# Patient Record
Sex: FEMALE | Race: WHITE | NOT HISPANIC OR LATINO | Employment: OTHER | URBAN - METROPOLITAN AREA
[De-identification: names, ages, dates, MRNs, and addresses within clinical notes are randomized per-mention and may not be internally consistent; named-entity substitution may affect disease eponyms.]

---

## 2019-09-21 NOTE — PROGRESS NOTES
Subjective   Chief Complaint   Patient presents with   • Annual Exam       History of Present Illness     60 yo wf presents as a new patient to establish care and have a new patient physical. She is feeling well and is without complaints. She lost 10# in the spring via diet and exercise. She was walking more, eating less, and eating better.     Last MMG 2013. Never had a colonoscopy. She is willing to have this done. Last P/P in 2013; uncertain with whom but thinks it was [facility].     She has her eyes checked twice yearly with the [facility].     She sees her dentist 1-2x a year.      There is no problem list on file for this patient.      No Known Allergies    Current Outpatient Medications on File Prior to Visit   Medication Sig Dispense Refill   • aspirin 81 MG EC tablet Take 81 mg by mouth Daily.     • Multiple Vitamins-Minerals (ICAPS AREDS 2 PO) Take  by mouth.     • Multiple Vitamins-Minerals (MULTI-VITAMIN GUMMIES PO) Take  by mouth.       No current facility-administered medications on file prior to visit.        History reviewed. No pertinent past medical history.    Family History   Problem Relation Age of Onset   • Hypertension Mother    • Diabetes Mother    • Diverticulitis Mother    • Heart disease Father    • Hearing loss Father        Social History     Socioeconomic History   • Marital status:      Spouse name: Not on file   • Number of children: Not on file   • Years of education: Not on file   • Highest education level: Not on file   Tobacco Use   • Smoking status: Never Smoker   • Smokeless tobacco: Never Used   Substance and Sexual Activity   • Alcohol use: Yes     Frequency: 2-3 times a week       Past Surgical History:   Procedure Laterality Date   • WISDOM TOOTH EXTRACTION  1987    bottom       The following portions of the patient's history were reviewed and updated as appropriate: problem list, allergies, current medications, past medical history, past family history,  "past social history and past surgical history.    Review of Systems   Constitutional: Negative for fatigue.   HENT: Negative for congestion.    Eyes: Negative for visual disturbance.   Respiratory: Negative for shortness of breath.    Cardiovascular: Negative for chest pain.   Gastrointestinal: Negative for blood in stool.   Endocrine: Negative.    Genitourinary: Negative.    Musculoskeletal: Negative for arthralgias.   Skin: Negative for rash.   Allergic/Immunologic: Negative for environmental allergies.   Neurological: Negative for headache.   Hematological: Does not bruise/bleed easily.   Psychiatric/Behavioral: The patient is not nervous/anxious.        Immunization History   Administered Date(s) Administered   • Tdap 09/23/2019   • flucelvax quad pfs =>4 YRS 09/23/2019       Objective   Vitals:    09/23/19 1049 09/23/19 1127   BP:  130/76   Pulse:  68   Resp:  12   Weight: 91.6 kg (202 lb)    Height: 157.5 cm (62\")      Physical Exam   Constitutional: She is oriented to person, place, and time. She appears well-developed and well-nourished.   HENT:   Head: Normocephalic and atraumatic.   Right Ear: External ear normal.   Left Ear: External ear normal.   Nose: Nose normal.   Mouth/Throat: Oropharynx is clear and moist.   Eyes: Conjunctivae and EOM are normal. Pupils are equal, round, and reactive to light. No scleral icterus.   Neck: Neck supple. Carotid bruit is not present. No thyromegaly present.   Cardiovascular: Normal rate, regular rhythm, normal heart sounds and intact distal pulses.   No murmur heard.  Pulmonary/Chest: Effort normal and breath sounds normal.   Abdominal: Soft. Bowel sounds are normal. She exhibits no distension and no mass. There is no hepatosplenomegaly. There is no tenderness. There is no rebound.   Musculoskeletal:   Ambulates without assistance; no clubbing, cyanosis or edema.    Lymphadenopathy:     She has no cervical adenopathy.   Neurological: She is alert and oriented to person, " place, and time.   Skin: Skin is warm and dry.   Psychiatric: She has a normal mood and affect.   Vitals reviewed.      Procedures    Assessment/Plan   Dudley was seen today for annual exam.    Diagnoses and all orders for this visit:    Annual physical exam  Comments:  Influenza and TDap vaccinations today. Encouraged weight loss. Shingrix at local pharmacy.     Breast cancer screening  -     Mammo Screening Bilateral With CAD; Future    Colon cancer screening  Comments:  Will set up with Dr. Virgen.   Orders:  -     Ambulatory Referral For Screening Colonoscopy    Health care maintenance  Comments:  Will schedule fasting labs and see Dr. Padilla for her P/P.   Orders:  -     Ambulatory Referral to Gynecology  -     Lipid Panel With / Chol / HDL Ratio  -     Comprehensive metabolic panel    Need for Tdap vaccination  Comments:  Today.   Orders:  -     Tdap Vaccine Greater Than or Equal To 6yo IM    Need for hepatitis C screening test  Comments:  With fasting labs.   Orders:  -     HCV Antibody Rfx To Qnt PCR    Elevated BP without diagnosis of hypertension  Comments:  New probelm; encouraged weight loss of 1/2-1# weekly with 150 min weekly aerobic activity and dietary changes. DASH diet encouraged.     Obesity (BMI 35.0-39.9 without comorbidity)  Comments:  New problem; encouraged weight loss via aerobic activity 150 minutes weekly as well as dietary changes.     Need for immunization against influenza  -     Flucelvax Quad=>4Years ([id number])        Return in about 3 months (around 12/23/2019).

## 2019-09-23 ENCOUNTER — OFFICE VISIT (OUTPATIENT)
Dept: INTERNAL MEDICINE | Facility: CLINIC | Age: 59
End: 2019-09-23

## 2019-09-23 VITALS
SYSTOLIC BLOOD PRESSURE: 130 MMHG | HEIGHT: 62 IN | RESPIRATION RATE: 12 BRPM | BODY MASS INDEX: 37.17 KG/M2 | HEART RATE: 68 BPM | WEIGHT: 202 LBS | DIASTOLIC BLOOD PRESSURE: 76 MMHG

## 2019-09-23 DIAGNOSIS — Z00.00 HEALTH CARE MAINTENANCE: ICD-10-CM

## 2019-09-23 DIAGNOSIS — R03.0 ELEVATED BP WITHOUT DIAGNOSIS OF HYPERTENSION: ICD-10-CM

## 2019-09-23 DIAGNOSIS — Z12.39 BREAST CANCER SCREENING: ICD-10-CM

## 2019-09-23 DIAGNOSIS — Z11.59 NEED FOR HEPATITIS C SCREENING TEST: ICD-10-CM

## 2019-09-23 DIAGNOSIS — Z23 NEED FOR IMMUNIZATION AGAINST INFLUENZA: ICD-10-CM

## 2019-09-23 DIAGNOSIS — Z12.11 COLON CANCER SCREENING: ICD-10-CM

## 2019-09-23 DIAGNOSIS — Z23 NEED FOR TDAP VACCINATION: ICD-10-CM

## 2019-09-23 DIAGNOSIS — Z00.00 ANNUAL PHYSICAL EXAM: Primary | ICD-10-CM

## 2019-09-23 DIAGNOSIS — E66.9 OBESITY (BMI 35.0-39.9 WITHOUT COMORBIDITY): ICD-10-CM

## 2019-09-23 PROCEDURE — 99386 PREV VISIT NEW AGE 40-64: CPT | Performed by: NURSE PRACTITIONER

## 2019-09-23 PROCEDURE — 90472 IMMUNIZATION ADMIN EACH ADD: CPT | Performed by: NURSE PRACTITIONER

## 2019-09-23 PROCEDURE — 90674 CCIIV4 VAC NO PRSV 0.5 ML IM: CPT | Performed by: NURSE PRACTITIONER

## 2019-09-23 PROCEDURE — 90471 IMMUNIZATION ADMIN: CPT | Performed by: NURSE PRACTITIONER

## 2019-09-23 PROCEDURE — 90715 TDAP VACCINE 7 YRS/> IM: CPT | Performed by: NURSE PRACTITIONER

## 2019-09-23 RX ORDER — ASPIRIN 81 MG/1
81 TABLET ORAL DAILY
COMMUNITY
End: 2021-07-18

## 2019-09-24 ENCOUNTER — TELEPHONE (OUTPATIENT)
Dept: OBSTETRICS AND GYNECOLOGY | Age: 59
End: 2019-09-24

## 2019-10-02 LAB
ALBUMIN SERPL-MCNC: 3.7 G/DL (ref 3.5–5.2)
ALBUMIN/GLOB SERPL: 1.2 G/DL
ALP SERPL-CCNC: 138 U/L (ref 39–117)
ALT SERPL-CCNC: 11 U/L (ref 1–33)
AST SERPL-CCNC: 14 U/L (ref 1–32)
BILIRUB SERPL-MCNC: 0.2 MG/DL (ref 0.2–1.2)
BUN SERPL-MCNC: 10 MG/DL (ref 6–20)
BUN/CREAT SERPL: 16.7 (ref 7–25)
CALCIUM SERPL-MCNC: 8.5 MG/DL (ref 8.6–10.5)
CHLORIDE SERPL-SCNC: 104 MMOL/L (ref 98–107)
CHOLEST SERPL-MCNC: 149 MG/DL (ref 0–200)
CHOLEST/HDLC SERPL: 3.24 {RATIO}
CO2 SERPL-SCNC: 27.4 MMOL/L (ref 22–29)
CREAT SERPL-MCNC: 0.6 MG/DL (ref 0.57–1)
GLOBULIN SER CALC-MCNC: 3.1 GM/DL
GLUCOSE SERPL-MCNC: 84 MG/DL (ref 65–99)
HCV AB S/CO SERPL IA: <0.1 S/CO RATIO (ref 0–0.9)
HCV AB SERPL QL IA: NORMAL
HDLC SERPL-MCNC: 46 MG/DL (ref 40–60)
LDLC SERPL CALC-MCNC: 82 MG/DL (ref 0–100)
POTASSIUM SERPL-SCNC: 4.5 MMOL/L (ref 3.5–5.2)
PROT SERPL-MCNC: 6.8 G/DL (ref 6–8.5)
SODIUM SERPL-SCNC: 141 MMOL/L (ref 136–145)
TRIGL SERPL-MCNC: 105 MG/DL (ref 0–150)
VLDLC SERPL CALC-MCNC: 21 MG/DL

## 2019-10-15 ENCOUNTER — HOSPITAL ENCOUNTER (OUTPATIENT)
Dept: MAMMOGRAPHY | Facility: HOSPITAL | Age: 59
Discharge: HOME | End: 2019-10-15
Admitting: NURSE PRACTITIONER

## 2019-10-15 DIAGNOSIS — Z12.39 BREAST CANCER SCREENING: ICD-10-CM

## 2019-10-15 PROCEDURE — 77067 SCR MAMMO BI INCL CAD: CPT

## 2019-12-18 NOTE — PROGRESS NOTES
"Subjective   Chief Complaint   Patient presents with   • Hypertension       History of Present Illness     60 yo wf presents for follow up regarding elevated BP for which LSM were advised.Denies CP or dyspnea. \"Walks with purpose\" 20 minutes 3d/wk. Has not lost weight.      Patient Active Problem List   Diagnosis   • Essential hypertension       No Known Allergies    Current Outpatient Medications on File Prior to Visit   Medication Sig Dispense Refill   • aspirin 81 MG EC tablet Take 81 mg by mouth Daily.     • Multiple Vitamins-Minerals (ICAPS AREDS 2 PO) Take  by mouth.     • Multiple Vitamins-Minerals (MULTI-VITAMIN GUMMIES PO) Take  by mouth.       No current facility-administered medications on file prior to visit.        History reviewed. No pertinent past medical history.    Family History   Problem Relation Age of Onset   • Hypertension Mother    • Diabetes Mother    • Diverticulitis Mother    • Heart disease Father    • Hearing loss Father        Social History     Socioeconomic History   • Marital status:      Spouse name: Not on file   • Number of children: Not on file   • Years of education: Not on file   • Highest education level: Not on file   Tobacco Use   • Smoking status: Never Smoker   • Smokeless tobacco: Never Used   Substance and Sexual Activity   • Alcohol use: Yes     Frequency: 2-3 times a week       Past Surgical History:   Procedure Laterality Date   • WISDOM TOOTH EXTRACTION  1987    bottom       The following portions of the patient's history were reviewed and updated as appropriate: problem list, allergies, current medications, past medical history and past social history.    Review of Systems   Respiratory: Negative for shortness of breath.    Cardiovascular: Negative for chest pain.       Immunization History   Administered Date(s) Administered   • Tdap 09/23/2019   • flucelvax quad pfs =>4 YRS 09/23/2019       Objective   Vitals:    12/19/19 1249 12/19/19 1316   BP:  138/76 " "  Pulse:  68   Resp:  12   Weight: 91.4 kg (201 lb 6.4 oz)    Height: 157.5 cm (62\")      Body mass index is 36.84 kg/m².  Physical Exam   Constitutional: She is oriented to person, place, and time. She appears well-developed and well-nourished.   HENT:   Head: Normocephalic and atraumatic.   Cardiovascular: Normal rate, regular rhythm, S1 normal, S2 normal and normal heart sounds.   Pulmonary/Chest: Effort normal and breath sounds normal.   Neurological: She is alert and oriented to person, place, and time.   Skin: Skin is warm and dry.   Psychiatric: She has a normal mood and affect.   Vitals reviewed.      Procedures    Assessment/Plan   Dudley was seen today for hypertension.    Diagnoses and all orders for this visit:    Essential hypertension  Comments:  New problem; start Losartan 10 mg daily & check BMP prior to follow up visit in 6 weeks. Side effects reviewed. 150 minutes weekly exercise. Weight loss advised  Orders:  -     losartan (COZAAR) 50 MG tablet; Take 1 tablet by mouth Daily.  -     Basic metabolic panel; Future    Records reviewed include previous OV with myself and labs.     Return in about 6 weeks (around 1/30/2020).           "

## 2019-12-19 ENCOUNTER — OFFICE VISIT (OUTPATIENT)
Dept: INTERNAL MEDICINE | Facility: CLINIC | Age: 59
End: 2019-12-19

## 2019-12-19 ENCOUNTER — TELEPHONE (OUTPATIENT)
Dept: INTERNAL MEDICINE | Facility: CLINIC | Age: 59
End: 2019-12-19

## 2019-12-19 VITALS
SYSTOLIC BLOOD PRESSURE: 138 MMHG | HEART RATE: 68 BPM | DIASTOLIC BLOOD PRESSURE: 76 MMHG | BODY MASS INDEX: 37.06 KG/M2 | WEIGHT: 201.4 LBS | HEIGHT: 62 IN | RESPIRATION RATE: 12 BRPM

## 2019-12-19 DIAGNOSIS — I10 ESSENTIAL HYPERTENSION: Primary | ICD-10-CM

## 2019-12-19 PROCEDURE — 99213 OFFICE O/P EST LOW 20 MIN: CPT | Performed by: NURSE PRACTITIONER

## 2019-12-19 RX ORDER — LOSARTAN POTASSIUM 50 MG/1
50 TABLET ORAL DAILY
Qty: 30 TABLET | Refills: 1 | Status: SHIPPED | OUTPATIENT
Start: 2019-12-19 | End: 2020-01-17 | Stop reason: SDUPTHER

## 2019-12-19 NOTE — TELEPHONE ENCOUNTER
Patient called and states that your note from today says start losartan 10 mg, but the prescription at the pharmacy says 50 mg. She is wanting to know which one is correct. Please advise.

## 2020-01-09 DIAGNOSIS — I10 ESSENTIAL HYPERTENSION: ICD-10-CM

## 2020-01-10 LAB
BUN SERPL-MCNC: 11 MG/DL (ref 8–23)
BUN/CREAT SERPL: 16.2 (ref 7–25)
CALCIUM SERPL-MCNC: 8.6 MG/DL (ref 8.6–10.5)
CHLORIDE SERPL-SCNC: 103 MMOL/L (ref 98–107)
CO2 SERPL-SCNC: 26.7 MMOL/L (ref 22–29)
CREAT SERPL-MCNC: 0.68 MG/DL (ref 0.57–1)
GLUCOSE SERPL-MCNC: 88 MG/DL (ref 65–99)
POTASSIUM SERPL-SCNC: 4.3 MMOL/L (ref 3.5–5.2)
SODIUM SERPL-SCNC: 142 MMOL/L (ref 136–145)

## 2020-01-15 NOTE — PROGRESS NOTES
Subjective   Chief Complaint   Patient presents with   • Hypertension       History of Present Illness     59 yo wf presents for follow up regarding HTN for which she was started on Losartan 50 mg daily. She is feeling well without complaints. Denies CP or dyspnea.      Patient Active Problem List   Diagnosis   • Essential hypertension       No Known Allergies    Current Outpatient Medications on File Prior to Visit   Medication Sig Dispense Refill   • aspirin 81 MG EC tablet Take 81 mg by mouth Daily.     • Multiple Vitamins-Minerals (ICAPS AREDS 2 PO) Take  by mouth.     • Multiple Vitamins-Minerals (MULTI-VITAMIN GUMMIES PO) Take  by mouth.       No current facility-administered medications on file prior to visit.        History reviewed. No pertinent past medical history.    Family History   Problem Relation Age of Onset   • Hypertension Mother    • Diabetes Mother    • Diverticulitis Mother    • Heart disease Father    • Hearing loss Father        Social History     Socioeconomic History   • Marital status:      Spouse name: Not on file   • Number of children: Not on file   • Years of education: Not on file   • Highest education level: Not on file   Tobacco Use   • Smoking status: Never Smoker   • Smokeless tobacco: Never Used   Substance and Sexual Activity   • Alcohol use: Yes     Frequency: 2-3 times a week       Past Surgical History:   Procedure Laterality Date   • WISDOM TOOTH EXTRACTION  1987    bottom       The following portions of the patient's history were reviewed and updated as appropriate: problem list, allergies, current medications, past medical history and past social history.    Review of Systems   Respiratory: Negative for shortness of breath.    Cardiovascular: Negative for chest pain.       Immunization History   Administered Date(s) Administered   • Tdap 09/23/2019   • flucelvax quad pfs =>4 YRS 09/23/2019       Objective   Vitals:    01/17/20 1255 01/17/20 1319   BP:  110/68  "  Pulse:  72   Resp:  12   Weight: 91.2 kg (201 lb)    Height: 157.5 cm (62\")      Body mass index is 36.76 kg/m².  Physical Exam   Constitutional: She appears well-developed and well-nourished.   HENT:   Head: Normocephalic and atraumatic.   Cardiovascular: Normal rate, regular rhythm, S1 normal, S2 normal and normal heart sounds.   Pulmonary/Chest: Effort normal and breath sounds normal.   Skin: Skin is warm and dry.   Psychiatric: She has a normal mood and affect.   Vitals reviewed.      Procedures    Assessment/Plan      Diagnoses and all orders for this visit:    1. Essential hypertension (Primary)  Comments:  BP at goal.   Orders:  -     Basic Metabolic Panel; Future  -     losartan (COZAAR) 50 MG tablet; Take 1 tablet by mouth Daily.  Dispense: 90 tablet; Refill: 1    2. Obesity, Class II, BMI 35-39.9  Comments:  Weight loss via dietary changes and 150 minutes weekly exercise advised.     3. Healthcare maintenance  Comments:  Shingrix at local pharmacy. P/P with Dr. Padilla in April. Has paperwork for C-scope.     Records reviewed include previous OV with myself as well as labs.     Return in about 6 months (around 7/17/2020).           "

## 2020-01-17 ENCOUNTER — OFFICE VISIT (OUTPATIENT)
Dept: INTERNAL MEDICINE | Facility: CLINIC | Age: 60
End: 2020-01-17

## 2020-01-17 VITALS
RESPIRATION RATE: 12 BRPM | HEART RATE: 72 BPM | BODY MASS INDEX: 36.99 KG/M2 | HEIGHT: 62 IN | WEIGHT: 201 LBS | SYSTOLIC BLOOD PRESSURE: 110 MMHG | DIASTOLIC BLOOD PRESSURE: 68 MMHG

## 2020-01-17 DIAGNOSIS — I10 ESSENTIAL HYPERTENSION: Primary | ICD-10-CM

## 2020-01-17 DIAGNOSIS — E66.9 OBESITY, CLASS II, BMI 35-39.9: ICD-10-CM

## 2020-01-17 DIAGNOSIS — Z00.00 HEALTHCARE MAINTENANCE: ICD-10-CM

## 2020-01-17 PROCEDURE — 99213 OFFICE O/P EST LOW 20 MIN: CPT | Performed by: NURSE PRACTITIONER

## 2020-01-17 RX ORDER — LOSARTAN POTASSIUM 50 MG/1
50 TABLET ORAL DAILY
Qty: 90 TABLET | Refills: 1 | Status: SHIPPED | OUTPATIENT
Start: 2020-01-17 | End: 2020-02-14 | Stop reason: SDUPTHER

## 2020-01-17 NOTE — PATIENT INSTRUCTIONS
You are advised to get Shingrix. It is a series of 2 shots given 2-6 months apart. Get this at you local pharmacy.

## 2020-02-14 ENCOUNTER — TELEPHONE (OUTPATIENT)
Dept: INTERNAL MEDICINE | Facility: CLINIC | Age: 60
End: 2020-02-14

## 2020-02-14 DIAGNOSIS — I10 ESSENTIAL HYPERTENSION: ICD-10-CM

## 2020-02-14 RX ORDER — LOSARTAN POTASSIUM 50 MG/1
50 TABLET ORAL DAILY
Qty: 90 TABLET | Refills: 1 | Status: SHIPPED | OUTPATIENT
Start: 2020-02-14 | End: 2020-07-17 | Stop reason: SDUPTHER

## 2020-02-14 NOTE — TELEPHONE ENCOUNTER
Patient needs refill on Losartan for 3 months supply sent in to [facility], please and thank you.

## 2020-07-09 DIAGNOSIS — I10 ESSENTIAL HYPERTENSION: ICD-10-CM

## 2020-07-11 LAB
BUN SERPL-MCNC: 11 MG/DL (ref 8–23)
BUN/CREAT SERPL: 13.9 (ref 7–25)
CALCIUM SERPL-MCNC: 8.7 MG/DL (ref 8.6–10.5)
CHLORIDE SERPL-SCNC: 105 MMOL/L (ref 98–107)
CO2 SERPL-SCNC: 25.4 MMOL/L (ref 22–29)
CREAT SERPL-MCNC: 0.79 MG/DL (ref 0.57–1)
GLUCOSE SERPL-MCNC: 100 MG/DL (ref 65–99)
POTASSIUM SERPL-SCNC: 4.3 MMOL/L (ref 3.5–5.2)
SODIUM SERPL-SCNC: 141 MMOL/L (ref 136–145)

## 2020-07-14 NOTE — PROGRESS NOTES
Subjective   Chief Complaint   Patient presents with   • Hypertension       History of Present Illness   60 y.o. female presents for follow up regarding HTN. Feeling good. Denies CP or dyspnea. Lost a good friend yesterday to COVID who was otherwise healthy. Notes she has had several dietary indiscretions over the last week prior to her labs. Denies polyuria, dipsia or phagia.      Patient Active Problem List   Diagnosis   • Essential hypertension       No Known Allergies    Current Outpatient Medications on File Prior to Visit   Medication Sig Dispense Refill   • aspirin 81 MG EC tablet Take 81 mg by mouth Daily.     • Multiple Vitamins-Minerals (ICAPS AREDS 2 PO) Take  by mouth.     • Multiple Vitamins-Minerals (MULTI-VITAMIN GUMMIES PO) Take  by mouth.     • [DISCONTINUED] losartan (COZAAR) 50 MG tablet Take 1 tablet by mouth Daily. 90 tablet 1     No current facility-administered medications on file prior to visit.        History reviewed. No pertinent past medical history.    Family History   Problem Relation Age of Onset   • Hypertension Mother    • Diabetes Mother    • Diverticulitis Mother    • Heart disease Father    • Hearing loss Father        Social History     Socioeconomic History   • Marital status:      Spouse name: Not on file   • Number of children: Not on file   • Years of education: Not on file   • Highest education level: Not on file   Tobacco Use   • Smoking status: Never Smoker   • Smokeless tobacco: Never Used   Substance and Sexual Activity   • Alcohol use: Yes     Frequency: 2-3 times a week       Past Surgical History:   Procedure Laterality Date   • WISDOM TOOTH EXTRACTION  1987    bottom       The following portions of the patient's history were reviewed and updated as appropriate: problem list, allergies, current medications, past medical history and past social history.    Review of Systems   Respiratory: Negative for shortness of breath.    Cardiovascular: Negative for chest  "pain.       Immunization History   Administered Date(s) Administered   • Influenza, Unspecified 09/23/2019   • Shingrix 03/02/2020, 06/22/2020   • Tdap 09/23/2019   • flucelvax quad pfs =>4 YRS 09/23/2019       Objective   Vitals:    07/17/20 0937   Temp: 97.5 °F (36.4 °C)   Weight: 89.4 kg (197 lb)   Height: 157.5 cm (62\")     Body mass index is 36.03 kg/m².  Physical Exam    Procedures    Assessment/Plan   Dudley was seen today for hypertension.    Diagnoses and all orders for this visit:    Essential hypertension  Comments:  Controlled.   Orders:  -     Comprehensive Metabolic Panel; Future  -     losartan (Cozaar) 50 MG tablet; Take 1 tablet by mouth Daily.    Impaired fasting glucose  -     Hemoglobin A1c; Future    Healthcare maintenance  Comments:  Declines colonoscopy. Advised to consider Cologard--will discuss timothy at her physical in 6 months.   Orders:  -     Lipid Panel With / Chol / HDL Ratio; Future    Obesity (BMI 30-39.9)  Comments:  Weight loss via dietary changes and 150 minutes weekly exercise advised.       Return in about 6 months (around 1/17/2021) for Annual physical, Lab B4 FUP.       "

## 2020-07-17 ENCOUNTER — OFFICE VISIT (OUTPATIENT)
Dept: INTERNAL MEDICINE | Facility: CLINIC | Age: 60
End: 2020-07-17

## 2020-07-17 VITALS — WEIGHT: 197 LBS | BODY MASS INDEX: 36.25 KG/M2 | HEIGHT: 62 IN | TEMPERATURE: 97.5 F

## 2020-07-17 DIAGNOSIS — Z00.00 HEALTHCARE MAINTENANCE: ICD-10-CM

## 2020-07-17 DIAGNOSIS — E66.9 OBESITY (BMI 30-39.9): ICD-10-CM

## 2020-07-17 DIAGNOSIS — R73.01 IMPAIRED FASTING GLUCOSE: ICD-10-CM

## 2020-07-17 DIAGNOSIS — I10 ESSENTIAL HYPERTENSION: Primary | ICD-10-CM

## 2020-07-17 PROCEDURE — 99213 OFFICE O/P EST LOW 20 MIN: CPT | Performed by: NURSE PRACTITIONER

## 2020-07-17 RX ORDER — LOSARTAN POTASSIUM 50 MG/1
50 TABLET ORAL DAILY
Qty: 90 TABLET | Refills: 1 | Status: SHIPPED | OUTPATIENT
Start: 2020-07-17 | End: 2020-08-04

## 2020-07-17 NOTE — PATIENT INSTRUCTIONS
Please get your flu shot when it becomes available.     Weight loss via dietary changes and 150 minutes weekly aerobic activity advised.

## 2020-08-04 DIAGNOSIS — I10 ESSENTIAL HYPERTENSION: ICD-10-CM

## 2020-08-04 RX ORDER — LOSARTAN POTASSIUM 50 MG/1
TABLET ORAL
Qty: 30 TABLET | Refills: 5 | Status: SHIPPED | OUTPATIENT
Start: 2020-08-04 | End: 2021-01-18 | Stop reason: SDUPTHER

## 2020-09-14 ENCOUNTER — OFFICE VISIT (OUTPATIENT)
Dept: OBSTETRICS AND GYNECOLOGY | Age: 60
End: 2020-09-14

## 2020-09-14 VITALS
DIASTOLIC BLOOD PRESSURE: 74 MMHG | WEIGHT: 195 LBS | BODY MASS INDEX: 34.55 KG/M2 | HEIGHT: 63 IN | SYSTOLIC BLOOD PRESSURE: 132 MMHG

## 2020-09-14 DIAGNOSIS — Z12.4 SCREENING FOR MALIGNANT NEOPLASM OF THE CERVIX: ICD-10-CM

## 2020-09-14 DIAGNOSIS — Z01.419 ENCOUNTER FOR GYNECOLOGICAL EXAMINATION WITHOUT ABNORMAL FINDING: Primary | ICD-10-CM

## 2020-09-14 PROCEDURE — 99386 PREV VISIT NEW AGE 40-64: CPT | Performed by: OBSTETRICS & GYNECOLOGY

## 2020-09-14 NOTE — PROGRESS NOTES
Routine Annual Visit    2020    Patient: Dudley Estrada          MR#:[id number]      History of Present Illness    Chief Complaint   Patient presents with   • Establish Care     new pt    • Gynecologic Exam     cc: pt here for annual visit today , last mmg 10/15/19 neg [facility] , last pap 2016 neg , pt says she had one abnormal pap in her life , colonoscopy not done had referral to dr jaramillo did not go . no complaints today , never had dexa        60 y.o. female  who presents as new patient for annual visit today.  Last mmg 10/15/19 neg [facility] , last pap 2016 neg , pt says she had one abnormal pap in her life. Colonoscopy not done had referral to dr jaramillo did not go. No complaints today.  Never had dexa.  Menopause at age 54. No HRT. Urged to get colonoscopy- never had one - agrees to schedule. She and  are retired. Him from accounting for [employer] and her from photograghy in [employer]. 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         No LMP recorded (lmp unknown). Patient is postmenopausal.  Obstetric History:  OB History        0    Para   0    Term   0       0    AB   0    Living   0       SAB   0    TAB   0    Ectopic   0    Molar   0    Multiple   0    Live Births   0               Menstrual History:     No LMP recorded (lmp unknown). Patient is postmenopausal.       Sexual History:       ________________________________________  Patient Active Problem List   Diagnosis   • Essential hypertension       Past Medical History:   Diagnosis Date   • Abnormal Pap smear of cervix     years ago        Past Surgical History:   Procedure Laterality Date   • WISDOM TOOTH EXTRACTION      bottom       Social History     Tobacco Use   Smoking Status Never Smoker   Smokeless Tobacco Never Used       Family History   Problem Relation Age of Onset   • Hypertension Mother    • Diabetes Mother    • Diverticulitis Mother    • Heart disease Father    •  "Hearing loss Father    • Breast cancer Neg Hx    • Ovarian cancer Neg Hx    • Uterine cancer Neg Hx    • Colon cancer Neg Hx        Prior to Admission medications    Medication Sig Start Date End Date Taking? Authorizing Provider   losartan (COZAAR) 50 MG tablet TAKE 1 TABLET BY MOUTH EVERY DAY 8/4/20  Yes Haydee Sherman APRN   Multiple Vitamins-Minerals (ICAPS AREDS 2 PO) Take  by mouth.   Yes Shirlene Ordoñez MD   Multiple Vitamins-Minerals (MULTI-VITAMIN GUMMIES PO) Take  by mouth.   Yes Shirlene Ordoñez MD   aspirin 81 MG EC tablet Take 81 mg by mouth Daily.    Shirlene Ordoñez MD     ________________________________________    Current contraception: none  History of abnormal Pap smear: yes - one many years ago    Family history of uterine or ovarian cancer: no  Family History of colon cancer/colon polyps: Never had colonoscopy   History of abnormal mammogram: no  History of abnormal lipids: no    The following portions of the patient's history were reviewed and updated as appropriate: allergies, current medications, past family history, past medical history, past social history, past surgical history and problem list.    Review of Systems    Pertinent items are noted in HPI.     Objective   Physical Exam    /74   Ht 160 cm (63\")   Wt 88.5 kg (195 lb)   LMP  (LMP Unknown)   Breastfeeding No   BMI 34.54 kg/m²    BP Readings from Last 3 Encounters:   09/14/20 132/74   01/17/20 110/68   12/19/19 138/76      Wt Readings from Last 3 Encounters:   09/14/20 88.5 kg (195 lb)   07/17/20 89.4 kg (197 lb)   01/17/20 91.2 kg (201 lb)        BMI: Estimated body mass index is 34.54 kg/m² as calculated from the following:    Height as of this encounter: 160 cm (63\").    Weight as of this encounter: 88.5 kg (195 lb).       General: alert, appears stated age and cooperative       Lungs: Effort wnl    Abdomen: soft, non-tender, without masses or organomegaly and soft, non-tender, without " masses, no organomegaly   Breast: inspection negative, no nipple discharge or bleeding, no masses or nodularity palpable   Vulva: normal   Vagina: atrophic appearance    Cervix: no lesions and nulliparous appearance   Uterus: normal size, fixed/poorly mobile  or non-tender   Adnexa: normal adnexa and exam limited by habitus      As part of wellness and prevention, the following topics were discussed with the patient:  Encouraged self breast exam  Encouraged screening colonoscopy         Assessment:    Dudley was seen today for establish care and gynecologic exam.    Diagnoses and all orders for this visit:    Encounter for gynecological examination without abnormal finding    Screening for malignant neoplasm of the cervix  -     IgP, Aptima HPV        Plan:  Return in about 1 year (around 9/14/2021) for Annual physical.      Haydee Padilla MD  9/14/2020 10:52 EDT

## 2020-09-16 LAB
CYTOLOGIST CVX/VAG CYTO: NORMAL
CYTOLOGY CVX/VAG DOC CYTO: NORMAL
CYTOLOGY CVX/VAG DOC THIN PREP: NORMAL
DX ICD CODE: NORMAL
HIV 1 & 2 AB SER-IMP: NORMAL
HPV I/H RISK 4 DNA CVX QL PROBE+SIG AMP: NEGATIVE
OTHER STN SPEC: NORMAL
STAT OF ADQ CVX/VAG CYTO-IMP: NORMAL

## 2021-01-17 PROBLEM — E66.811 OBESITY (BMI 30.0-34.9): Status: ACTIVE | Noted: 2021-01-17

## 2021-01-17 PROBLEM — E66.9 OBESITY (BMI 30.0-34.9): Status: ACTIVE | Noted: 2021-01-17

## 2021-01-17 NOTE — PATIENT INSTRUCTIONS
An order has been placed for your screening colonoscopy. Please be sure to schedule this.     An order was placed for your screening mammogram. Please be sure to have this scheduled.

## 2021-01-17 NOTE — PROGRESS NOTES
Subjective   Chief Complaint   Patient presents with   • Annual Exam       History of Present Illness   61 y.o. female presents for annual physical. Feeling well without complaints.     P/P with Dr. Padilla 09/14/2020--normal. RTO 1-2 year. Last MMG 10/15/2019. Never had DXA scan--would like to wait till after vaccinations against COVID more widespread.     Declines C-scope due to negative FH.     Patient Active Problem List   Diagnosis   • Essential hypertension   • Obesity (BMI 30.0-34.9)   • Morbidly obese (CMS/HCC)       No Known Allergies    Current Outpatient Medications on File Prior to Visit   Medication Sig Dispense Refill   • Multiple Vitamins-Minerals (ICAPS AREDS 2 PO) Take  by mouth.     • Multiple Vitamins-Minerals (MULTI-VITAMIN GUMMIES PO) Take  by mouth.     • [DISCONTINUED] losartan (COZAAR) 50 MG tablet TAKE 1 TABLET BY MOUTH EVERY DAY 30 tablet 5   • aspirin 81 MG EC tablet Take 81 mg by mouth Daily.       No current facility-administered medications on file prior to visit.        Past Medical History:   Diagnosis Date   • Abnormal Pap smear of cervix     years ago        Family History   Problem Relation Age of Onset   • Hypertension Mother    • Diabetes Mother    • Diverticulitis Mother    • Heart disease Father    • Hearing loss Father    • Breast cancer Neg Hx    • Ovarian cancer Neg Hx    • Uterine cancer Neg Hx    • Colon cancer Neg Hx        Social History     Socioeconomic History   • Marital status:      Spouse name: Not on file   • Number of children: Not on file   • Years of education: Not on file   • Highest education level: Not on file   Tobacco Use   • Smoking status: Never Smoker   • Smokeless tobacco: Never Used   Substance and Sexual Activity   • Alcohol use: Yes     Frequency: 2-3 times a week   • Drug use: Never   • Sexual activity: Yes     Partners: Male       Past Surgical History:   Procedure Laterality Date   • WISDOM TOOTH EXTRACTION  1987    bottom       The following  portions of the patient's history were reviewed and updated as appropriate: problem list, allergies, current medications, past medical history, past family history, past social history and past surgical history.    Review of Systems   Constitutional: Negative for fatigue.   HENT: Negative for congestion.    Eyes: Negative for visual disturbance.   Respiratory: Negative for shortness of breath.    Cardiovascular: Negative for chest pain.   Gastrointestinal: Negative for blood in stool.   Endocrine: Negative.    Genitourinary: Negative.    Musculoskeletal: Negative for arthralgias.   Skin: Negative for rash.   Allergic/Immunologic: Negative for environmental allergies.   Neurological: Negative for headache.   Hematological: Does not bruise/bleed easily.   Psychiatric/Behavioral: The patient is not nervous/anxious.        Immunization History   Administered Date(s) Administered   • Flu Vaccine Intradermal Quad 18-64YR 10/06/2020   • Influenza, Unspecified 09/23/2019   • Shingrix 03/02/2020, 06/22/2020   • Tdap 09/23/2019   • flucelvax quad pfs =>4 YRS 09/23/2019, 10/08/2020       Objective   Vitals:    01/18/21 1253   BP: 126/68   Pulse: 76   Resp: 16   Temp: 97.8 °F (36.6 °C)   Weight: 91.6 kg (202 lb)     Body mass index is 35.78 kg/m².  Physical Exam  Vitals signs reviewed.   Constitutional:       Appearance: Normal appearance. She is well-developed.   HENT:      Head: Normocephalic and atraumatic.      Right Ear: Tympanic membrane, ear canal and external ear normal.      Left Ear: Tympanic membrane, ear canal and external ear normal.      Nose: Nose normal.      Mouth/Throat:      Mouth: Mucous membranes are moist.      Pharynx: Oropharynx is clear. No oropharyngeal exudate or posterior oropharyngeal erythema.   Eyes:      General: No scleral icterus.     Extraocular Movements: Extraocular movements intact.      Conjunctiva/sclera: Conjunctivae normal.      Pupils: Pupils are equal, round, and reactive to light.    Neck:      Musculoskeletal: Neck supple.      Thyroid: No thyromegaly.   Cardiovascular:      Rate and Rhythm: Normal rate and regular rhythm.      Heart sounds: Normal heart sounds. No murmur.   Pulmonary:      Effort: Pulmonary effort is normal.      Breath sounds: Normal breath sounds.   Abdominal:      General: Bowel sounds are normal. There is no distension.      Palpations: Abdomen is soft.      Tenderness: There is no abdominal tenderness.   Musculoskeletal:      Comments: Ambulates without assistance; no clubbing, cyanosis or edema.    Lymphadenopathy:      Cervical: No cervical adenopathy.   Skin:     General: Skin is warm and dry.   Neurological:      Mental Status: She is alert.   Psychiatric:         Mood and Affect: Mood normal.         Behavior: Behavior normal.         Procedures    Assessment/Plan   Diagnoses and all orders for this visit:    1. Annual physical exam (Primary)  Comments:  Weight loss via dietary changes and 150 minutes aerobic exercise weekly advised. C-scope advised but she declines at this time. Schedule DXA at next visit.     2. Essential hypertension  Comments:  Control.     Orders:  -     Basic Metabolic Panel; Future  -     losartan (COZAAR) 50 MG tablet; Take 1 tablet by mouth Daily.  Dispense: 90 tablet; Refill: 1    3. Encounter for screening mammogram for malignant neoplasm of breast  -     Mammo Screening Bilateral With CAD    4. Colon cancer screening  Comments:  Declines at this time. Reassess in follow up.     5. Morbidly obese (CMS/HCC)  Comments:  Weight loss via dietary changes and 150 minutes wekly aerobic exercise advised.     Records reviewed include previous OV with myself as well as labs.     Return in about 6 months (around 7/18/2021) for Lab B4 FUP.

## 2021-01-18 ENCOUNTER — OFFICE VISIT (OUTPATIENT)
Dept: INTERNAL MEDICINE | Facility: CLINIC | Age: 61
End: 2021-01-18

## 2021-01-18 VITALS
RESPIRATION RATE: 16 BRPM | HEART RATE: 76 BPM | TEMPERATURE: 97.8 F | SYSTOLIC BLOOD PRESSURE: 126 MMHG | WEIGHT: 202 LBS | BODY MASS INDEX: 35.78 KG/M2 | DIASTOLIC BLOOD PRESSURE: 68 MMHG

## 2021-01-18 DIAGNOSIS — Z12.31 ENCOUNTER FOR SCREENING MAMMOGRAM FOR MALIGNANT NEOPLASM OF BREAST: ICD-10-CM

## 2021-01-18 DIAGNOSIS — Z12.11 COLON CANCER SCREENING: ICD-10-CM

## 2021-01-18 DIAGNOSIS — Z00.00 ANNUAL PHYSICAL EXAM: Primary | ICD-10-CM

## 2021-01-18 DIAGNOSIS — E66.01 MORBIDLY OBESE (HCC): ICD-10-CM

## 2021-01-18 DIAGNOSIS — I10 ESSENTIAL HYPERTENSION: ICD-10-CM

## 2021-01-18 PROCEDURE — 99396 PREV VISIT EST AGE 40-64: CPT | Performed by: NURSE PRACTITIONER

## 2021-01-18 RX ORDER — LOSARTAN POTASSIUM 50 MG/1
50 TABLET ORAL DAILY
Qty: 90 TABLET | Refills: 1 | Status: SHIPPED | OUTPATIENT
Start: 2021-01-18 | End: 2021-07-29

## 2021-01-26 ENCOUNTER — TRANSCRIBE ORDERS (OUTPATIENT)
Dept: ADMINISTRATIVE | Facility: HOSPITAL | Age: 61
End: 2021-01-26

## 2021-01-26 DIAGNOSIS — Z12.31 SCREENING MAMMOGRAM, ENCOUNTER FOR: Primary | ICD-10-CM

## 2021-01-28 ENCOUNTER — APPOINTMENT (OUTPATIENT)
Dept: MAMMOGRAPHY | Facility: HOSPITAL | Age: 61
End: 2021-01-28

## 2021-03-22 ENCOUNTER — BULK ORDERING (OUTPATIENT)
Dept: CASE MANAGEMENT | Facility: OTHER | Age: 61
End: 2021-03-22

## 2021-03-22 DIAGNOSIS — Z23 IMMUNIZATION DUE: ICD-10-CM

## 2021-07-12 DIAGNOSIS — I10 ESSENTIAL HYPERTENSION: ICD-10-CM

## 2021-07-12 LAB
BUN SERPL-MCNC: 10 MG/DL (ref 8–23)
BUN/CREAT SERPL: 14.3 (ref 7–25)
CALCIUM SERPL-MCNC: 8.9 MG/DL (ref 8.6–10.5)
CHLORIDE SERPL-SCNC: 107 MMOL/L (ref 98–107)
CO2 SERPL-SCNC: 24.6 MMOL/L (ref 22–29)
CREAT SERPL-MCNC: 0.7 MG/DL (ref 0.57–1)
GLUCOSE SERPL-MCNC: 86 MG/DL (ref 65–99)
POTASSIUM SERPL-SCNC: 4.3 MMOL/L (ref 3.5–5.2)
SODIUM SERPL-SCNC: 141 MMOL/L (ref 136–145)

## 2021-07-18 NOTE — PROGRESS NOTES
Subjective   Chief Complaint   Patient presents with   • Hypertension       History of Present Illness   61 y.o. female presents for follow up regarding HTN and obesity. She is feeling well without chest or dyspnea. Down 3# trying to eat less but no dietary changes. More active--using pool more and gardening.     Willing to have CLS after a healthy friend was diagnosed with CRC.      Patient Active Problem List   Diagnosis   • Essential hypertension   • Morbidly obese (CMS/HCC)       No Known Allergies    Current Outpatient Medications on File Prior to Visit   Medication Sig Dispense Refill   • losartan (COZAAR) 50 MG tablet Take 1 tablet by mouth Daily. 90 tablet 1   • Multiple Vitamins-Minerals (ICAPS AREDS 2 PO) Take  by mouth.     • Multiple Vitamins-Minerals (MULTI-VITAMIN GUMMIES PO) Take  by mouth.       No current facility-administered medications on file prior to visit.       Past Medical History:   Diagnosis Date   • Abnormal Pap smear of cervix     years ago        Family History   Problem Relation Age of Onset   • Hypertension Mother    • Diabetes Mother    • Diverticulitis Mother    • Heart disease Father    • Hearing loss Father    • Breast cancer Neg Hx    • Ovarian cancer Neg Hx    • Uterine cancer Neg Hx    • Colon cancer Neg Hx        Social History     Socioeconomic History   • Marital status:      Spouse name: Not on file   • Number of children: Not on file   • Years of education: Not on file   • Highest education level: Not on file   Tobacco Use   • Smoking status: Never Smoker   • Smokeless tobacco: Never Used   Substance and Sexual Activity   • Alcohol use: Yes   • Drug use: Never   • Sexual activity: Yes     Partners: Male       Past Surgical History:   Procedure Laterality Date   • WISDOM TOOTH EXTRACTION  1987    bottom       The following portions of the patient's history were reviewed and updated as appropriate: problem list, allergies, current medications, past medical history,  "past family history, past social history and past surgical history.    Review of Systems    Immunization History   Administered Date(s) Administered   • COVID-19 (PFIZER) 03/02/2021, 03/23/2021   • Flu Vaccine Intradermal Quad 18-64YR 10/06/2020   • Influenza, Unspecified 09/23/2019   • Shingrix 03/02/2020, 06/22/2020   • Tdap 09/23/2019   • flucelvax quad pfs =>4 YRS 09/23/2019, 10/08/2020       Objective   Vitals:    07/19/21 1027   BP: 118/68   Pulse: 72   Resp: 16   Temp: 97 °F (36.1 °C)   Weight: 90.3 kg (199 lb)   Height: 160 cm (63\")     Body mass index is 35.25 kg/m².  Physical Exam  Vitals reviewed.   Constitutional:       Appearance: Normal appearance. She is well-developed.   HENT:      Head: Normocephalic and atraumatic.   Cardiovascular:      Rate and Rhythm: Normal rate and regular rhythm.      Heart sounds: Normal heart sounds, S1 normal and S2 normal.   Pulmonary:      Effort: Pulmonary effort is normal.      Breath sounds: Normal breath sounds.   Skin:     General: Skin is warm and dry.   Neurological:      Mental Status: She is alert.   Psychiatric:         Mood and Affect: Mood normal.         Behavior: Behavior normal.         Procedures    Assessment/Plan   Diagnoses and all orders for this visit:    1. Essential hypertension (Primary)  Comments:  Controlled. Continue Losartan 50 mg daily.   Orders:  -     Comprehensive Metabolic Panel; Future  -     Lipid Panel With / Chol / HDL Ratio; Future    2. Morbidly obese (CMS/HCC)  Comments:  Weight loss via dietary cahnges and 150 minutes weekly aerobic activity advised.     3. Colon cancer screening  Comments:  Schedule CLS with Dr. Alvarez.    Orders:  -     Ambulatory Referral For Screening Colonoscopy    4. COVID-19 vaccine series completed    Records reviewed include previous OV with myself as well as labs.     Return in about 6 months (around 1/19/2022) for Annual physical.           "

## 2021-07-19 ENCOUNTER — OFFICE VISIT (OUTPATIENT)
Dept: INTERNAL MEDICINE | Facility: CLINIC | Age: 61
End: 2021-07-19

## 2021-07-19 VITALS
SYSTOLIC BLOOD PRESSURE: 118 MMHG | BODY MASS INDEX: 35.26 KG/M2 | HEIGHT: 63 IN | RESPIRATION RATE: 16 BRPM | HEART RATE: 72 BPM | WEIGHT: 199 LBS | DIASTOLIC BLOOD PRESSURE: 68 MMHG | TEMPERATURE: 97 F

## 2021-07-19 DIAGNOSIS — I10 ESSENTIAL HYPERTENSION: Primary | Chronic | ICD-10-CM

## 2021-07-19 DIAGNOSIS — Z12.11 COLON CANCER SCREENING: ICD-10-CM

## 2021-07-19 DIAGNOSIS — Z92.29 COVID-19 VACCINE SERIES COMPLETED: ICD-10-CM

## 2021-07-19 DIAGNOSIS — E66.01 MORBIDLY OBESE (HCC): Chronic | ICD-10-CM

## 2021-07-19 PROBLEM — E66.9 OBESITY (BMI 30.0-34.9): Status: RESOLVED | Noted: 2021-01-17 | Resolved: 2021-07-19

## 2021-07-19 PROBLEM — E66.811 OBESITY (BMI 30.0-34.9): Status: RESOLVED | Noted: 2021-01-17 | Resolved: 2021-07-19

## 2021-07-19 PROCEDURE — 99214 OFFICE O/P EST MOD 30 MIN: CPT | Performed by: NURSE PRACTITIONER

## 2021-07-19 NOTE — PATIENT INSTRUCTIONS
An order has been placed for your screening colonoscopy. Please be sure to schedule this.     Please be sure to schedule a skin check with your dermatologist; if you do not have a dermatologist, I recommend Dr. Beau Nash, Dr. Regino Faye or Dr. Yusra Lecuhga

## 2021-07-29 DIAGNOSIS — I10 ESSENTIAL HYPERTENSION: ICD-10-CM

## 2021-07-29 RX ORDER — LOSARTAN POTASSIUM 50 MG/1
TABLET ORAL
Qty: 90 TABLET | Refills: 1 | Status: SHIPPED | OUTPATIENT
Start: 2021-07-29 | End: 2022-02-07

## 2021-08-12 ENCOUNTER — PREP FOR SURGERY (OUTPATIENT)
Dept: SURGERY | Facility: SURGERY CENTER | Age: 61
End: 2021-08-12

## 2021-08-12 DIAGNOSIS — Z12.11 ENCOUNTER FOR SCREENING FOR MALIGNANT NEOPLASM OF COLON: Primary | ICD-10-CM

## 2021-08-12 RX ORDER — SODIUM CHLORIDE 0.9 % (FLUSH) 0.9 %
10 SYRINGE (ML) INJECTION AS NEEDED
Status: CANCELLED | OUTPATIENT
Start: 2021-08-12

## 2021-08-12 RX ORDER — SODIUM CHLORIDE, SODIUM LACTATE, POTASSIUM CHLORIDE, CALCIUM CHLORIDE 600; 310; 30; 20 MG/100ML; MG/100ML; MG/100ML; MG/100ML
30 INJECTION, SOLUTION INTRAVENOUS CONTINUOUS PRN
Status: CANCELLED | OUTPATIENT
Start: 2021-08-12

## 2021-08-12 RX ORDER — SODIUM CHLORIDE 0.9 % (FLUSH) 0.9 %
3 SYRINGE (ML) INJECTION EVERY 12 HOURS SCHEDULED
Status: CANCELLED | OUTPATIENT
Start: 2021-08-12

## 2021-11-12 ENCOUNTER — HOSPITAL ENCOUNTER (OUTPATIENT)
Dept: GENERAL RADIOLOGY | Facility: HOSPITAL | Age: 61
Discharge: HOME | End: 2021-11-12
Admitting: NURSE PRACTITIONER

## 2021-11-12 ENCOUNTER — OFFICE VISIT (OUTPATIENT)
Dept: INTERNAL MEDICINE | Facility: CLINIC | Age: 61
End: 2021-11-12

## 2021-11-12 VITALS — WEIGHT: 201 LBS | TEMPERATURE: 97.5 F | BODY MASS INDEX: 35.61 KG/M2 | HEIGHT: 63 IN

## 2021-11-12 DIAGNOSIS — G89.29 CHRONIC PAIN OF RIGHT KNEE: Primary | ICD-10-CM

## 2021-11-12 DIAGNOSIS — M25.561 ACUTE PAIN OF RIGHT KNEE: Primary | ICD-10-CM

## 2021-11-12 DIAGNOSIS — M25.561 CHRONIC PAIN OF RIGHT KNEE: Primary | ICD-10-CM

## 2021-11-12 DIAGNOSIS — E66.01 MORBIDLY OBESE (HCC): ICD-10-CM

## 2021-11-12 PROCEDURE — 99213 OFFICE O/P EST LOW 20 MIN: CPT | Performed by: NURSE PRACTITIONER

## 2021-11-12 PROCEDURE — 73562 X-RAY EXAM OF KNEE 3: CPT

## 2021-11-12 NOTE — PROGRESS NOTES
Subjective   No chief complaint on file.      History of Present Illness   61 y.o. female presents with cc as above ongoing times several months. Right leg feels tight and achy around the right knee. NKI. No swelling or redness. Worse with walking. Tylenol helps--using intermittently 1-2 every few days. No personal history of clots.     Patient Active Problem List   Diagnosis   • Essential hypertension   • Morbidly obese (HCC)   • Encounter for screening for malignant neoplasm of colon       No Known Allergies    Current Outpatient Medications on File Prior to Visit   Medication Sig Dispense Refill   • losartan (COZAAR) 50 MG tablet TAKE 1 TABLET BY MOUTH EVERY DAY 90 tablet 1   • Multiple Vitamins-Minerals (ICAPS AREDS 2 PO) Take  by mouth.     • Multiple Vitamins-Minerals (MULTI-VITAMIN GUMMIES PO) Take  by mouth.       No current facility-administered medications on file prior to visit.       Past Medical History:   Diagnosis Date   • Abnormal Pap smear of cervix     years ago        Family History   Problem Relation Age of Onset   • Hypertension Mother    • Diabetes Mother    • Diverticulitis Mother    • Heart disease Father    • Hearing loss Father    • Breast cancer Neg Hx    • Ovarian cancer Neg Hx    • Uterine cancer Neg Hx    • Colon cancer Neg Hx        Social History     Socioeconomic History   • Marital status:    Tobacco Use   • Smoking status: Never Smoker   • Smokeless tobacco: Never Used   Substance and Sexual Activity   • Alcohol use: Yes   • Drug use: Never   • Sexual activity: Yes     Partners: Male       Past Surgical History:   Procedure Laterality Date   • WISDOM TOOTH EXTRACTION  1987    bottom       The following portions of the patient's history were reviewed and updated as appropriate: problem list, allergies, current medications, past medical history and past social history.    Review of Systems    Immunization History   Administered Date(s) Administered   • COVID-19 (PFIZER)  03/02/2021, 03/23/2021   • Flu Vaccine Intradermal Quad 18-64YR 10/06/2020   • Flu Vaccine Quad PF >18YRS 09/23/2021   • Influenza, Unspecified 09/23/2019   • Shingrix 03/02/2020, 06/22/2020   • Tdap 09/23/2019   • flucelvax quad pfs =>4 YRS 09/23/2019, 10/08/2020       Objective   There were no vitals filed for this visit.  There is no height or weight on file to calculate BMI.  Physical Exam  Constitutional:       Appearance: Normal appearance. She is obese.   HENT:      Head: Normocephalic and atraumatic.   Cardiovascular:      Pulses:           Popliteal pulses are 2+ on the right side and 2+ on the left side.        Dorsalis pedis pulses are 2+ on the right side and 2+ on the left side.   Pulmonary:      Effort: Pulmonary effort is normal.   Musculoskeletal:      Right lower leg: No edema.      Left lower leg: No edema.      Comments: Ambulates without assistance; no clubbing, cyanosis or edema. Right knee without erythema or swelling. FROM. Crepitus noted. Negative varus/valgus stress. Pain with anterior drawer maneuver. No palpable cord or Baker cyst on exam.        Skin:     General: Skin is warm and dry.   Neurological:      Mental Status: She is alert.         Procedures    Assessment/Plan   Diagnoses and all orders for this visit:    1. Acute pain of right knee (Primary)  Comments:  New problem. XR today. PT advised. Tylenol or Aleve prn. Discussed ortho depending on imaging results.   Orders:  -     XR Knee 3 View Right  -     Ambulatory Referral to Physical Therapy Evaluate and treat    2. Morbidly obese (HCC)    Records reviewed include previous OV with myself as well as labs.     No follow-ups on file.

## 2021-11-15 ENCOUNTER — OFFICE VISIT (OUTPATIENT)
Dept: ORTHOPEDIC SURGERY | Facility: CLINIC | Age: 61
End: 2021-11-15

## 2021-11-15 VITALS — WEIGHT: 200 LBS | HEIGHT: 63 IN | BODY MASS INDEX: 35.44 KG/M2 | TEMPERATURE: 97.3 F

## 2021-11-15 DIAGNOSIS — S83.241A TEAR OF MEDIAL MENISCUS OF RIGHT KNEE, CURRENT, UNSPECIFIED TEAR TYPE, INITIAL ENCOUNTER: Primary | ICD-10-CM

## 2021-11-15 PROCEDURE — 99203 OFFICE O/P NEW LOW 30 MIN: CPT | Performed by: ORTHOPAEDIC SURGERY

## 2021-11-15 NOTE — PROGRESS NOTES
New Right  Knee      Patient: Dudley Estrada        YOB: 1960    Medical Record Number: [id number]        Chief Complaints: right knee pain      History of Present Illness: This is a 61-year-old female who presents complaining of right knee pain is been ongoing a couple of months she states yesterday she was crossing the street she all of a sudden felt a pop and her pain worsened.  Now it locks and catches she cannot fully extend she has pain with weightbearing.  She is currently using a cane her symptoms are moderate intermittent stabbing aching with swelling worse with walking and extension somewhat better with ice and rest no prior history of similar symptoms she has a BMI of 35 her other past medical history is unremarkable        Allergies: No Known Allergies    Medications:   Home Medications:  Current Outpatient Medications on File Prior to Visit   Medication Sig   • losartan (COZAAR) 50 MG tablet TAKE 1 TABLET BY MOUTH EVERY DAY   • Multiple Vitamins-Minerals (ICAPS AREDS 2 PO) Take  by mouth.   • Multiple Vitamins-Minerals (MULTI-VITAMIN GUMMIES PO) Take  by mouth.     No current facility-administered medications on file prior to visit.     Current Medications:  Scheduled Meds:  Continuous Infusions:No current facility-administered medications for this visit.    PRN Meds:.    Past Medical History:   Diagnosis Date   • Abnormal Pap smear of cervix     years ago         Past Surgical History:   Procedure Laterality Date   • WISDOM TOOTH EXTRACTION  1987    bottom        Social History     Occupational History   • Not on file   Tobacco Use   • Smoking status: Never Smoker   • Smokeless tobacco: Never Used   Substance and Sexual Activity   • Alcohol use: Yes   • Drug use: Never   • Sexual activity: Yes     Partners: Male      Social History     Social History Narrative   • Not on file        Family History   Problem Relation Age of Onset   • Hypertension Mother    • Diabetes Mother    • Diverticulitis  Mother    • Heart disease Father    • Hearing loss Father    • Breast cancer Neg Hx    • Ovarian cancer Neg Hx    • Uterine cancer Neg Hx    • Colon cancer Neg Hx              Review of Systems: 14 point review of systems remarkable for the knee pain only the remainder negative per the patient    Review of Systems      Physical Exam: 61 y.o. female  General Appearance:    Alert, cooperative, in no acute distress                 There were no vitals filed for this visit.   Patient is alert and read ×3 no acute distress appears her above-listed at height weight and age.  Affect is normal respiratory rate is normal unlabored. Heart rate regular rate rhythm, sclera, dentition and hearing are normal for the purpose of this exam.        Ortho Exam Physical exam of the right  knee reveals no effusion no redness.  The patient does have tenderness about the medial l joint line.  No tenderness about the lateral l joint line.  A negative bounce home and a positive l medial Nathalia.    Patient has a stable ligamentous exam.  The patient has a negative Lachman and negative anterior drawer and a negative pivot shift.  Quads are reasonable and symmetric bilaterally.  Calf is soft and nontender.  There is no overlying skin changes no lymphedema lymphadenopathy.  Patient has good hip range of motion full symmetric and asymptomatic and a normal ankle exam.  She has good distal pulses and sensation distally is intact        Procedures             Radiology:   AP, Lateral and merchant views of the right knee  were reviewed to evauateknee pain.  I reviewed these in epic patient states she was standing on one of them but they did not say standing films.  She does have some mild medial compartment narrowing is some irregularity on the medial femoral condyle and some mild to moderate patellofemoral OA no acute pathology  Imaging Results (Most Recent)     None        Assessment/Plan:    Right knee pain she does have some mild degenerative  changes she has distinct mechanical symptoms right now plan is to proceed with an MRI to better evaluate her meniscus as that is high on my list

## 2021-11-16 ENCOUNTER — TELEPHONE (OUTPATIENT)
Dept: ORTHOPEDIC SURGERY | Facility: CLINIC | Age: 61
End: 2021-11-16

## 2021-11-16 NOTE — TELEPHONE ENCOUNTER
Caller: RICHA RAMIREZ    Relationship: SELF    Best call back number: [phone]    What orders are you requesting (i.e. lab or imaging): MRI    In what timeframe would the patient need to come in: ASAP    Where will you receive your lab/imaging services: [facility]    Additional notes: PATIENTS INSURANCE WILL ACCEPT THIS ONE. [facility] WAS NOT IN NETWORK. PLEASE FAX ORDERS -[phone]

## 2021-11-16 NOTE — TELEPHONE ENCOUNTER
Caller: RICHA RAMIREZ    Relationship: SELF    Best call back number: [phone]    What orders are you requesting (i.e. lab or imaging): MRI ON RIGHT KNEE    In what timeframe would the patient need to come in: ASAP    Where will you receive your lab/imaging services: [facility] PLEASE FAX ORDERS [phone]    Additional notes: PATIENT CAN GET IN SOONER AT [facility] SO WOULD LIKE ORDERS SENT THERE

## 2021-12-06 ENCOUNTER — HOSPITAL ENCOUNTER (OUTPATIENT)
Dept: PHYSICAL THERAPY | Facility: HOSPITAL | Age: 61
Setting detail: THERAPIES SERIES
Discharge: HOME | End: 2021-12-06

## 2021-12-06 DIAGNOSIS — M25.561 ACUTE PAIN OF RIGHT KNEE: Primary | ICD-10-CM

## 2021-12-06 DIAGNOSIS — R26.2 DIFFICULTY WALKING: ICD-10-CM

## 2021-12-06 PROCEDURE — 97530 THERAPEUTIC ACTIVITIES: CPT

## 2021-12-06 PROCEDURE — 97161 PT EVAL LOW COMPLEX 20 MIN: CPT

## 2021-12-06 NOTE — THERAPY EVALUATION
Outpatient Physical Therapy Ortho Initial Evaluation  [facility]     Patient Name: Dudley Estrada  : 1960  MRN: [id number]  Today's Date: 2021      Visit Date: 2021    Patient Active Problem List   Diagnosis   • Essential hypertension   • Morbidly obese (HCC)   • Encounter for screening for malignant neoplasm of colon        Past Medical History:   Diagnosis Date   • Abnormal Pap smear of cervix     years ago    • Ankle sprain ,    • Knee swelling 2021        Past Surgical History:   Procedure Laterality Date   • WISDOM TOOTH EXTRACTION      bottom       Visit Dx:     ICD-10-CM ICD-9-CM   1. Acute pain of right knee  M25.561 719.46   2. Difficulty walking  R26.2 719.7          Patient History     Row Name 21 1000 21 0911          History    Chief Complaint Difficulty Walking; Difficulty with daily activities; Joint stiffness; Muscle weakness; Tightness  -RS Difficulty Walking; Difficulty with daily activities; Joint stiffness; Muscle weakness; Tightness  -RS (r) patient (t)     Type of Pain Knee pain; Lower Extremity / Leg  -RS Knee pain; Lower Extremity / Leg  -RS (r) patient (t)     Date Current Problem(s) Began 21  -RS 21  -RS (r) patient (t)     Brief Description of Current Complaint The pt is a a 62 yo female who presents with R knee pain present a few months but significantly increased Nov 14 when her knee locked up when she was crossing the street. She has not had any incidents of locking up since.  She felt recently that something that was out of place moved back and she can move some better now. Before that occurred she could not tolerate R knee extension. She is a , enjoys yard work, walking. She feels as though she does not trust her RLE currently since the incident occurred. Her MD ordered an MRI but pt does not feel acute need for MRI based on current symptoms. She is going out of town for the rest of this month.  -RS Right knee  is undependable;  will feel as if it’s not going to bear weight.   Tightness in the hamstring doesnt allow full range of motion.  -RS (r) patient (t)     Patient/Caregiver Goals Relieve pain; Improve mobility; Improve strength; Know what to do to help the symptoms  -RS Relieve pain; Improve mobility; Improve strength; Know what to do to help the symptoms  -RS (r) patient (t)     Hand Dominance right-handed  -RS right-handed  -RS (r) patient (t)     Occupation/sports/leisure activities Walking, yard work/gardening, photography  -RS Walking, yard work/gardening, photography  -RS (r) patient (t)     What clinical tests have you had for this problem? X-ray  -RS X-ray  -RS (r) patient (t)     Are you or can you be pregnant No  -RS No  -RS (r) patient (t)            Pain     Pain Location Knee  -RS --     Pain at Present 0; 1  -RS --     Pain at Worst 7  -RS --     Is your sleep disturbed? Yes  -RS --            Fall Risk Assessment    Any falls in the past year: No  -RS No  -RS (r) patient (t)            Services    Prior Rehab/Home Health Experiences No  -RS No  -RS (r) patient (t)     Are you currently receiving Home Health services No  -RS No  -RS (r) patient (t)     Do you plan to receive Home Health services in the near future No  -RS No  -RS (r) patient (t)            Daily Activities    Primary Language English  -RS English  -RS (r) patient (t)     Are you able to read Yes  -RS Yes  -RS (r) patient (t)     Are you able to write Yes  -RS Yes  -RS (r) patient (t)     How does patient learn best? Listening; Reading; Demonstration; Pictures/Video  -RS Listening; Reading; Demonstration; Pictures/Video  -RS (r) patient (t)     Pt Participated in POC and Goals Yes  -RS --            Safety    Are you being hurt, hit, or frightened by anyone at home or in your life? No  -RS No  -RS (r) patient (t)     Are you being neglected by a caregiver No  -RS No  -RS (r) patient (t)     Have you had any of the following issues with  N/A  -RS N/A  -RS (r) patient (t)           User Key  (r) = Recorded By, (t) = Taken By, (c) = Cosigned By    Initials Name Provider Type    RS Ara Norris PT Physical Therapist    patient Dudley Estrada --                 PT Ortho     Row Name 12/06/21 1000       Special Tests/Palpation    Special Tests/Palpation Knee  -RS       Knee Palpation    Knee Palpation? Yes  -RS    Medial Joint Line Left:; Tender  -RS    Quads Left:; Tender  -RS       Patellar Accessory Motions    Patellar Accessory Motions Tested? Yes  -RS    Superior glide Hypomobile; Right:  -RS    Inferior glide Hypomobile; Right:  -RS    Medial glide WNL  -RS    Lateral glide WNL  -RS       Knee Special Tests    Valgus stress (MCL lesion) Right:; Negative  -RS    Thessaly test (meniscal lesion) Right:; Negative  -RS    Nathalia’s test (meniscal lesion) Right:  irritable- no overt pain  -RS       General ROM    RT Lower Ext Rt Knee Extension/Flexion  -RS    LT Lower Ext Lt Knee Extension/Flexion  -RS       Right Lower Ext    Rt Knee Extension/Flexion AROM lacking 4-105  -RS       Left Lower Ext    Lt Knee Extension/Flexion AROM 0-118  -RS       MMT (Manual Muscle Testing)    Rt Lower Ext Rt Hip Flexion; Rt Hip ABduction; Rt Knee Extension; Rt Knee Flexion; Rt Ankle Plantarflexion; Rt Ankle Dorsiflexion  -RS    Lt Lower Ext Lt Hip Flexion; Lt Hip ABduction; Lt Knee Extension; Lt Knee Flexion; Lt Ankle Plantarflexion; Lt Ankle Dorsiflexion  -RS       MMT Right Lower Ext    Rt Hip Flexion MMT, Gross Movement (4+/5) good plus  -RS    Rt Hip ABduction MMT, Gross Movement (4-/5) good minus  -RS    Rt Knee Extension MMT, Gross Movement (4+/5) good plus  -RS    Rt Knee Flexion MMT, Gross Movement (4/5) good  -RS    Rt Ankle Plantarflexion MMT, Gross Movement (4+/5) good plus  -RS    Rt Ankle Dorsiflexion MMT, Gross Movement (5/5) normal  -RS       MMT Left Lower Ext    Lt Hip Flexion MMT, Gross Movement (5/5) normal  -RS    Lt Hip ABduction MMT, Gross  "Movement (4/5) good  -RS    Lt Knee Extension MMT, Gross Movement (5/5) normal  -RS    Lt Knee Flexion MMT, Gross Movement (4+/5) good plus  -RS    Lt Ankle Plantarflexion MMT, Gross Movement (4+/5) good plus  -RS    Lt Ankle Dorsiflexion MMT, Gross Movement (5/5) normal  -RS       Sensation    Sensation WNL? WNL  -RS       Flexibility    Flexibility Tested? Lower Extremity  -RS       Lower Extremity Flexibility    Hamstrings Bilateral:; Mildly limited  -RS    Gastrocnemius Bilateral:; Mildly limited  -RS       Balance Skills Training    SLS dec stability RLE compared to L with reports of decreased \"trust\" in RLE  -RS          User Key  (r) = Recorded By, (t) = Taken By, (c) = Cosigned By    Initials Name Provider Type    Ara Das PT Physical Therapist                            Therapy Education  Education Details: Role of outpatient PT, POC, differential diagnosis, initial HEP, expectations, goals, anatomy.Access Code [email or web address]  Given: HEP, Symptoms/condition management, Pain management  Program: New  How Provided: Verbal, Demonstration, Written  Provided to: Patient  Level of Understanding: Verbalized, Demonstrated      PT OP Goals     Row Name 12/06/21 1300          PT Short Term Goals    STG Date to Achieve 01/05/22  -RS     STG 1 The pt will demonstrate R knee AROM to at least 0-115degrees flex to facilitate improved stair navigation.  -RS     STG 1 Progress New  -RS            Long Term Goals    LTG Date to Achieve 02/09/22  -RS     LTG 1 The pt will demonstrate IND and compliant with HEP focused on IND condition management and return to PLOF>  -RS     LTG 1 Progress New  -RS     LTG 2 The pt will demonstrate R SLS to at least 6 sec to facilitate improved stair navigation and static balance.  -RS     LTG 2 Progress New  -RS     LTG 3 The pt will score at least 85% ability on the KOS to indicate improved perceived performance of ADLs.  -RS     LTG 3 Progress New  -RS     LTG 4 The pt will resume " walking about 1 mile without increased pain in R knee to faciltate recreational activity performance.  -RS     LTG 4 Progress New  -RS     LTG 5 The pt will resume reciprocal stair navigation with single HR to facilitate improved work performance.  -RS     LTG 5 Progress New  -RS            Time Calculation    PT Goal Re-Cert Due Date 03/06/22  -RS           User Key  (r) = Recorded By, (t) = Taken By, (c) = Cosigned By    Initials Name Provider Type    RS Ara Norris, PT Physical Therapist                 PT Assessment/Plan     Row Name 12/06/21 1300          PT Assessment    Functional Limitations Performance in work activities; Performance in self-care ADL; Performance in leisure activities; Limitations in functional capacity and performance; Limitations in community activities; Limitation in home management; Impaired gait  -RS     Impairments Balance; Gait; Range of motion; Pain; Muscle strength; Joint mobility  -RS     Assessment Comments Dudley Estrada is a 61 y.o. female referred to physical therapy for R knee pain. She presents with a stable clinical presentation, along with no remarkable comorbidities and personal factors of increased BMI that may impact her progress in the plan of care. Pt presents today with decreased R knee mobility into extension and flexion compared to L knee, decreased R SLS, dec R hip and knee strength, inc TTP R medial joint line . her signs and symptoms are consistent with referring diagnosis. The previous impairments limit her ability to navigate stairs, squat, tolerate prolonged standing and walking, participate in photography/work performance. The pt self scores 72% ability on the KOS (100=full ability).Pt will benefit from skilled PT to address the previous impairments and return to PLOF.  -RS     Please refer to paper survey for additional self-reported information Yes  -RS     Rehab Potential Good  -RS     Patient/caregiver participated in establishment of treatment plan  and goals Yes  -RS     Patient would benefit from skilled therapy intervention Yes  -RS            PT Plan    PT Frequency 2x/week; 1x/week  -RS     Predicted Duration of Therapy Intervention (PT) 12 weeks, total of 10 sessions  -RS     Planned CPT's? PT EVAL LOW COMPLEXITY: 56876; PT RE-EVAL: 22002; PT THER PROC EA 15 MIN: 32420; PT THER ACT EA 15 MIN: 48407; PT MANUAL THERAPY EA 15 MIN: 87272; PT NEUROMUSC RE-EDUCATION EA 15 MIN: 90736; PT GAIT TRAINING EA 15 MIN: 77739; PT AQUATIC THERAPY EA 15 MIN: 16597; PT SELF CARE/HOME MGMT/TRAIN EA 15: 73041; PT HOT OR COLD PACK TREAT MCARE; PT ELECTRICAL STIM UNATTEND:   -RS     PT Plan Comments Assess tolerance for initial HEP, focus on open chain activities progressing to closed chain as tolerance allows. Pt will be gone for approx 3 weeks then return to PT. COnsider warm up on bike, SAQ, LAQ with weight, side steps, TKE, balance challenges, gait training  -RS           User Key  (r) = Recorded By, (t) = Taken By, (c) = Cosigned By    Initials Name Provider Type    RS Ara Norris, PT Physical Therapist                   OP Exercises     Row Name 12/06/21 1100             Total Minutes    23039 - PT Therapeutic Activity Minutes 16  -RS              Exercise 1    Exercise Name 1 SLR flex and ER  -RS      Cueing 1 Verbal; Demo  -RS      Sets 1 2  -RS      Reps 1 10  -RS      Additional Comments with QS  -RS              Exercise 2    Exercise Name 2 clamshell  -RS      Cueing 2 Verbal; Demo  -RS      Reps 2 10  -RS      Time 2 RTB  -RS              Exercise 3    Exercise Name 3 seated HS stretch  -RS      Cueing 3 Verbal; Demo  -RS      Reps 3 3  -RS      Time 3 20  -RS              Exercise 4    Exercise Name 4 SLS  -RS      Time 4 demo only  -RS              Exercise 5    Exercise Name 5 LAQ  -RS      Cueing 5 Verbal; Demo  -RS      Sets 5 2  -RS      Reps 5 10  -RS            User Key  (r) = Recorded By, (t) = Taken By, (c) = Cosigned By    Initials Name  Provider Type    RS Ara Norris, PT Physical Therapist                                        Time Calculation:     Start Time: 1045  Stop Time: 1125  Time Calculation (min): 40 min  Timed Charges  94343 - PT Therapeutic Activity Minutes: 16  Total Minutes  Timed Charges Total Minutes: 16   Total Minutes: 16     Therapy Charges for Today     Code Description Service Date Service Provider Modifiers Qty    96330534903  PT THERAPEUTIC ACT EA 15 MIN 12/6/2021 Ara Norris, PT GP 1    [id number] HC PT EVAL LOW COMPLEXITY 2 12/6/2021 Ara Norris, PT GP 1                    Ara Norris PT  12/6/2021

## 2021-12-10 ENCOUNTER — TELEPHONE (OUTPATIENT)
Dept: ORTHOPEDIC SURGERY | Facility: CLINIC | Age: 61
End: 2021-12-10

## 2021-12-10 NOTE — TELEPHONE ENCOUNTER
CALLED PT AND TOLD HER THAT [facility] DOES NOT TAKE HER INSURANCE, SHE SAID P.T.  WAS HELPING, SHE MIGHT JUST HOLD OFF ON THE MRI, HER KNEE IS FEELING MUCH BETTER,LLR

## 2021-12-12 ENCOUNTER — APPOINTMENT (OUTPATIENT)
Dept: MRI IMAGING | Facility: HOSPITAL | Age: 61
End: 2021-12-12

## 2021-12-28 ENCOUNTER — TELEPHONE (OUTPATIENT)
Dept: GASTROENTEROLOGY | Facility: CLINIC | Age: 61
End: 2021-12-28

## 2021-12-30 ENCOUNTER — ANESTHESIA (OUTPATIENT)
Dept: SURGERY | Facility: SURGERY CENTER | Age: 61
End: 2021-12-30

## 2021-12-30 ENCOUNTER — ANESTHESIA EVENT (OUTPATIENT)
Dept: SURGERY | Facility: SURGERY CENTER | Age: 61
End: 2021-12-30

## 2021-12-30 ENCOUNTER — HOSPITAL ENCOUNTER (OUTPATIENT)
Facility: SURGERY CENTER | Age: 61
Setting detail: HOSPITAL OUTPATIENT SURGERY
Discharge: HOME | End: 2021-12-30
Attending: INTERNAL MEDICINE | Admitting: INTERNAL MEDICINE

## 2021-12-30 VITALS
OXYGEN SATURATION: 99 % | BODY MASS INDEX: 34.48 KG/M2 | DIASTOLIC BLOOD PRESSURE: 73 MMHG | TEMPERATURE: 97.1 F | SYSTOLIC BLOOD PRESSURE: 116 MMHG | RESPIRATION RATE: 16 BRPM | HEIGHT: 63 IN | WEIGHT: 194.6 LBS | HEART RATE: 66 BPM

## 2021-12-30 DIAGNOSIS — Z12.11 ENCOUNTER FOR SCREENING FOR MALIGNANT NEOPLASM OF COLON: ICD-10-CM

## 2021-12-30 PROCEDURE — 45378 DIAGNOSTIC COLONOSCOPY: CPT | Performed by: INTERNAL MEDICINE

## 2021-12-30 PROCEDURE — 0DJD8ZZ INSPECTION OF LOWER INTESTINAL TRACT, VIA NATURAL OR ARTIFICIAL OPENING ENDOSCOPIC: ICD-10-PCS | Performed by: INTERNAL MEDICINE

## 2021-12-30 PROCEDURE — 25010000002 PROPOFOL 10 MG/ML EMULSION: Performed by: NURSE ANESTHETIST, CERTIFIED REGISTERED

## 2021-12-30 RX ORDER — LIDOCAINE HYDROCHLORIDE 20 MG/ML
INJECTION, SOLUTION INFILTRATION; PERINEURAL AS NEEDED
Status: DISCONTINUED | OUTPATIENT
Start: 2021-12-30 | End: 2021-12-30 | Stop reason: SURG

## 2021-12-30 RX ORDER — SODIUM CHLORIDE 0.9 % (FLUSH) 0.9 %
3 SYRINGE (ML) INJECTION EVERY 12 HOURS SCHEDULED
Status: DISCONTINUED | OUTPATIENT
Start: 2021-12-30 | End: 2021-12-30 | Stop reason: HOSPADM

## 2021-12-30 RX ORDER — SODIUM CHLORIDE, SODIUM LACTATE, POTASSIUM CHLORIDE, CALCIUM CHLORIDE 600; 310; 30; 20 MG/100ML; MG/100ML; MG/100ML; MG/100ML
30 INJECTION, SOLUTION INTRAVENOUS CONTINUOUS PRN
Status: DISCONTINUED | OUTPATIENT
Start: 2021-12-30 | End: 2021-12-30 | Stop reason: HOSPADM

## 2021-12-30 RX ORDER — SODIUM CHLORIDE 0.9 % (FLUSH) 0.9 %
10 SYRINGE (ML) INJECTION AS NEEDED
Status: DISCONTINUED | OUTPATIENT
Start: 2021-12-30 | End: 2021-12-30 | Stop reason: HOSPADM

## 2021-12-30 RX ORDER — PROPOFOL 10 MG/ML
VIAL (ML) INTRAVENOUS AS NEEDED
Status: DISCONTINUED | OUTPATIENT
Start: 2021-12-30 | End: 2021-12-30 | Stop reason: SURG

## 2021-12-30 RX ORDER — MAGNESIUM HYDROXIDE 1200 MG/15ML
LIQUID ORAL AS NEEDED
Status: DISCONTINUED | OUTPATIENT
Start: 2021-12-30 | End: 2021-12-30 | Stop reason: HOSPADM

## 2021-12-30 RX ADMIN — LIDOCAINE HYDROCHLORIDE 60 MG: 20 INJECTION, SOLUTION INFILTRATION; PERINEURAL at 10:21

## 2021-12-30 RX ADMIN — PROPOFOL 200 MCG/KG/MIN: 10 INJECTION, EMULSION INTRAVENOUS at 10:21

## 2021-12-30 RX ADMIN — PROPOFOL 50 MG: 10 INJECTION, EMULSION INTRAVENOUS at 10:21

## 2021-12-30 RX ADMIN — SODIUM CHLORIDE, POTASSIUM CHLORIDE, SODIUM LACTATE AND CALCIUM CHLORIDE 30 ML/HR: 600; 310; 30; 20 INJECTION, SOLUTION INTRAVENOUS at 09:02

## 2021-12-30 NOTE — ANESTHESIA PREPROCEDURE EVALUATION
Anesthesia Evaluation     Patient summary reviewed and Nursing notes reviewed   NPO Solid Status: > 8 hours  NPO Liquid Status: > 2 hours           Airway   Mallampati: II  Dental - normal exam     Pulmonary - negative pulmonary ROS and normal exam   Cardiovascular - normal exam    (+) hypertension,       Neuro/Psych- negative ROS  GI/Hepatic/Renal/Endo    (+) obesity,       Musculoskeletal (-) negative ROS    Abdominal    Substance History - negative use     OB/GYN          Other - negative ROS                       Anesthesia Plan    ASA 2     MAC       Anesthetic plan, all risks, benefits, and alternatives have been provided, discussed and informed consent has been obtained with: patient.

## 2021-12-30 NOTE — ANESTHESIA POSTPROCEDURE EVALUATION
"Patient: Dudley Estrada    Procedure Summary     Date: 12/30/21 Room / Location: [facility]    Anesthesia Start: 1016 Anesthesia Stop: 1038    Procedure: COLONOSCOPY (N/A ) Diagnosis:       Encounter for screening for malignant neoplasm of colon      (Encounter for screening for malignant neoplasm of colon [Z12.11])    Surgeons: Bishnu Alvarez MD Provider: Alta Jolly MD    Anesthesia Type: MAC ASA Status: 2          Anesthesia Type: MAC    Vitals  Vitals Value Taken Time   /73 12/30/21 1100   Temp 36.2 °C (97.1 °F) 12/30/21 1038   Pulse 66 12/30/21 1100   Resp 16 12/30/21 1100   SpO2 99 % 12/30/21 1100           Post Anesthesia Care and Evaluation    Patient location during evaluation: PHASE II  Patient participation: complete - patient participated  Level of consciousness: awake  Pain management: adequate  Airway patency: patent  Anesthetic complications: No anesthetic complications    Cardiovascular status: acceptable  Respiratory status: acceptable  Hydration status: acceptable    Comments: /73 (BP Location: Left arm, Patient Position: Sitting)   Pulse 66   Temp 36.2 °C (97.1 °F) (Infrared)   Resp 16   Ht 160 cm (63\")   Wt 88.3 kg (194 lb 9.6 oz)   LMP  (LMP Unknown)   SpO2 99%   BMI 34.47 kg/m²       "

## 2022-01-04 ENCOUNTER — HOSPITAL ENCOUNTER (OUTPATIENT)
Dept: PHYSICAL THERAPY | Facility: HOSPITAL | Age: 62
Setting detail: THERAPIES SERIES
Discharge: HOME | End: 2022-01-04

## 2022-01-04 DIAGNOSIS — M25.561 ACUTE PAIN OF RIGHT KNEE: Primary | ICD-10-CM

## 2022-01-04 DIAGNOSIS — R26.2 DIFFICULTY WALKING: ICD-10-CM

## 2022-01-04 PROCEDURE — 97110 THERAPEUTIC EXERCISES: CPT

## 2022-01-04 NOTE — THERAPY TREATMENT NOTE
Outpatient Physical Therapy Ortho Progress Note  [facility]     Patient Name: Dudley Estrada  : 1960  MRN: [id number]  Today's Date: 2022      Visit Date: 2022    Visit Dx:    ICD-10-CM ICD-9-CM   1. Acute pain of right knee  M25.561 719.46   2. Difficulty walking  R26.2 719.7       Patient Active Problem List   Diagnosis   • Essential hypertension   • Morbidly obese (HCC)   • Encounter for screening for malignant neoplasm of colon        Past Medical History:   Diagnosis Date   • Abnormal Pap smear of cervix     years ago    • Ankle sprain ,    • Knee swelling 2021        Past Surgical History:   Procedure Laterality Date   • COLONOSCOPY N/A 2021    Procedure: COLONOSCOPY;  Surgeon: Bishnu Alvarez MD;  Location: [facility] MAIN OR;  Service: Gastroenterology;  Laterality: N/A;  hemorrhoids, diverticulosis,   • WISDOM TOOTH EXTRACTION  1987    bottom        PT Ortho     Row Name 22 1220       Subjective Comments    Subjective Comments Pt reports much improvement over the past few weeks. Performing HEP 3-4x/week  -JS       Subjective Pain    Able to rate subjective pain? yes  -JS    Pre-Treatment Pain Level 1  -JS       Right Lower Ext    Rt Knee Extension/Flexion AROM 0-4-118  -JS          User Key  (r) = Recorded By, (t) = Taken By, (c) = Cosigned By    Initials Name Provider Type    Sanjuanita Duke, PT Physical Therapist                             PT Assessment/Plan     Row Name 22 1228          PT Assessment    Assessment Comments Dudley Estrada has been seen for 2 physical therapy sessions for R knee pain, as patient was seen for evaluation and returned today for 2nd visit after being out of town for the holidays.  Treatment has included therapeutic exercise, neuro-muscular retraining , gait training and patient education with home exercise program . Progress to physical therapy goals is good with improvement in subjective report of pain, independence in initial  HEP and improving balance.  She will benefit from continued skilled physical therapy to address remaining impairments and functional limitations, with plans to schedule patient 1x/week due to improvement in symptoms and compliance with HEP, with goal of treatment to achieve remaining goals.  -JS     Rehab Potential Good  -JS     Patient/caregiver participated in establishment of treatment plan and goals Yes  -JS     Patient would benefit from skilled therapy intervention Yes  -JS            PT Plan    PT Frequency 1x/week  -JS     Predicted Duration of Therapy Intervention (PT) 4-6 visits  -JS     PT Plan Comments Pt to be seen 1x/week. Consider knee flexion & hamstring stretching on stairs, step ups & review updated HEP next visit.  -JS           User Key  (r) = Recorded By, (t) = Taken By, (c) = Cosigned By    Initials Name Provider Type    Sanjuanita Duke, PT Physical Therapist                   OP Exercises     Row Name 01/04/22 1220             Subjective Comments    Subjective Comments Pt reports much improvement over the past few weeks. Performing HEP 3-4x/week  -JS              Subjective Pain    Able to rate subjective pain? yes  -JS      Pre-Treatment Pain Level 1  -JS              Total Minutes    53103 - PT Therapeutic Activity Minutes 40  -JS              Exercise 1    Exercise Name 1 SLR flex and ER  -JS      Cueing 1 Verbal; Demo  -JS      Sets 1 2  -JS      Reps 1 10  -JS      Additional Comments with QS  -JS              Exercise 2    Exercise Name 2 clamshell  -JS      Cueing 2 Verbal; Demo  -JS      Sets 2 2  -JS      Reps 2 10  -JS      Time 2 RTB  -JS              Exercise 3    Exercise Name 3 seated HS stretch  -JS      Cueing 3 Verbal; Demo  -JS      Reps 3 3  -JS      Time 3 20  -JS              Exercise 4    Exercise Name 4 SLS  -JS      Reps 4 3 B  -JS      Time 4 10 sec on R  -JS              Exercise 5    Exercise Name 5 LAQ  -JS      Cueing 5 Verbal; Demo  -JS      Sets 5 2  -JS      Reps  5 10  -JS      Time 5 2#  -JS              Exercise 6    Exercise Name 6 Recumbent bike  -JS      Cueing 6 Verbal  -JS      Time 6 5 min  -JS              Exercise 7    Exercise Name 7 Sidestepping  -JS      Cueing 7 Verbal; Demo  -JS      Reps 7 3 laps  -JS      Time 7 No UE support  -JS              Exercise 8    Exercise Name 8 SAQ  -JS      Cueing 8 Verbal; Demo  -JS      Sets 8 2  -JS      Reps 8 10  -JS      Time 8 2#  -JS              Exercise 9    Exercise Name 9 Standing TKE  -JS      Cueing 9 Verbal; Demo  -JS      Reps 9 10  -JS      Time 9 RTB  -JS            User Key  (r) = Recorded By, (t) = Taken By, (c) = Cosigned By    Initials Name Provider Type    Sanjuanita Duke, PT Physical Therapist                              PT OP Goals     Row Name 01/04/22 1220          PT Short Term Goals    STG Date to Achieve 01/05/22  -JS     STG 1 The pt will demonstrate R knee AROM to at least 0-115degrees flex to facilitate improved stair navigation.  -JS     STG 1 Progress Ongoing; Progressing  -JS            Long Term Goals    LTG Date to Achieve 02/09/22  -JS     LTG 1 The pt will demonstrate IND and compliant with HEP focused on IND condition management and return to PLOF>  -JS     LTG 1 Progress Ongoing  -JS     LTG 2 The pt will demonstrate R SLS to at least 6 sec to facilitate improved stair navigation and static balance.  -JS     LTG 2 Progress Met  -JS     LTG 3 The pt will score at least 85% ability on the KOS to indicate improved perceived performance of ADLs.  -JS     LTG 3 Progress Ongoing  -JS     LTG 4 The pt will resume walking about 1 mile without increased pain in R knee to faciltate recreational activity performance.  -JS     LTG 4 Progress Ongoing  -JS     LTG 5 The pt will resume reciprocal stair navigation with single HR to facilitate improved work performance.  -JS     LTG 5 Progress Ongoing  -JS           User Key  (r) = Recorded By, (t) = Taken By, (c) = Cosigned By    Initials Name Provider  Type    JS Sanjuanita Morales PT Physical Therapist                Therapy Education  Education Details: Issued new written exercises via [employer] [email or web address]  Given: HEP  Program: Reinforced, Progressed  How Provided: Verbal  Provided to: Patient  Level of Understanding: Teach back education performed, Verbalized, Demonstrated              Time Calculation:   Start Time: 1220  Stop Time: 1300  Time Calculation (min): 40 min  Timed Charges  96325 - PT Therapeutic Activity Minutes: 40  Total Minutes  Timed Charges Total Minutes: 40   Total Minutes: 40                Sanjuanita Morales PT  1/4/2022

## 2022-01-11 ENCOUNTER — HOSPITAL ENCOUNTER (OUTPATIENT)
Dept: PHYSICAL THERAPY | Facility: HOSPITAL | Age: 62
Setting detail: THERAPIES SERIES
Discharge: HOME | End: 2022-01-11

## 2022-01-11 DIAGNOSIS — M25.561 ACUTE PAIN OF RIGHT KNEE: Primary | ICD-10-CM

## 2022-01-11 DIAGNOSIS — R26.2 DIFFICULTY WALKING: ICD-10-CM

## 2022-01-11 PROCEDURE — 97110 THERAPEUTIC EXERCISES: CPT

## 2022-01-11 PROCEDURE — 97530 THERAPEUTIC ACTIVITIES: CPT

## 2022-01-11 NOTE — THERAPY PROGRESS REPORT/RE-CERT
Outpatient Physical Therapy Ortho Progress Note  [facility]     Patient Name: Dudley Estrada  : 1960  MRN: [id number]  Today's Date: 2022      Visit Date: 2022    Visit Dx:    ICD-10-CM ICD-9-CM   1. Acute pain of right knee  M25.561 719.46   2. Difficulty walking  R26.2 719.7       Patient Active Problem List   Diagnosis   • Essential hypertension   • Morbidly obese (HCC)   • Encounter for screening for malignant neoplasm of colon        Past Medical History:   Diagnosis Date   • Abnormal Pap smear of cervix     years ago    • Ankle sprain ,    • Knee swelling 2021        Past Surgical History:   Procedure Laterality Date   • COLONOSCOPY N/A 2021    Procedure: COLONOSCOPY;  Surgeon: Bishnu Alvarez MD;  Location: [facility] MAIN OR;  Service: Gastroenterology;  Laterality: N/A;  hemorrhoids, diverticulosis,   • WISDOM TOOTH EXTRACTION      [facility]        PT Ortho     Row Name 22 1300       MMT Right Lower Ext    Rt Hip Flexion MMT, Gross Movement (4+/5) good plus  -RS    Rt Hip ABduction MMT, Gross Movement (4/5) good  -RS    Rt Knee Extension MMT, Gross Movement (4+/5) good plus  -RS    Rt Knee Flexion MMT, Gross Movement (4+/5) good plus  -RS    Rt Ankle Plantarflexion MMT, Gross Movement (4+/5) good plus  -RS    Rt Ankle Dorsiflexion MMT, Gross Movement (5/5) normal  -RS    Row Name 22 1200       Right Lower Ext    Rt Knee Extension/Flexion AROM lacking 4-122  -RS          User Key  (r) = Recorded By, (t) = Taken By, (c) = Cosigned By    Initials Name Provider Type    RS Ara Norris PT Physical Therapist                             PT Assessment/Plan     Row Name 22 1300          PT Assessment    Functional Limitations Performance in work activities; Performance in leisure activities; Limitations in community activities; Limitation in home management; Impaired gait  -RS     Impairments Balance; Gait; Range of motion; Pain; Muscle strength;  Joint mobility  -RS     Assessment Comments The pt has been seen for 3 total sessions of skilled PT for R knee pain and has partially met 1/1 STG with marked improvements in R knee flexion AROM from 105 at eval to 122 flex this date. Limitations in active R knee terminal extension remain, therefore added hell prop stretch to HEP. Pt has partially met or met 3/5 LTG and is progressing well toward remaining goals. She report pain has been 0-1/10 in the past week and she has been walking a mile without limitation. Progressed standing strengthening to include STS from elevated surface, resisted side steps, and step ups all with good tolerance. Updated HEP appropriately. Pt remains a good candidate for skilled PT.  -RS     Please refer to paper survey for additional self-reported information Yes  -RS     Rehab Potential Good  -RS     Patient/caregiver participated in establishment of treatment plan and goals Yes  -RS     Patient would benefit from skilled therapy intervention Yes  -RS            PT Plan    PT Plan Comments Continue to focus on functional strengthening, consider lateral step ups, mini squats  -RS           User Key  (r) = Recorded By, (t) = Taken By, (c) = Cosigned By    Initials Name Provider Type    RS Ara Norris, PT Physical Therapist                   OP Exercises     Row Name 01/11/22 1200             Subjective Comments    Subjective Comments Pt reports significant improvement in pain overall, has been more active without limitation  -RS              Subjective Pain    Able to rate subjective pain? yes  -RS      Pre-Treatment Pain Level 0  -RS              Total Minutes    86505 - PT Therapeutic Exercise Minutes 30  -RS      77021 - PT Therapeutic Activity Minutes 10  -RS              Exercise 1    Exercise Name 1 --  -RS      Cueing 1 --  -RS      Sets 1 --  -RS      Reps 1 --  -RS              Exercise 2    Exercise Name 2 --  -RS      Cueing 2 --  -RS      Sets 2 --  -RS      Reps 2 --   -RS      Time 2 --  -RS              Exercise 3    Exercise Name 3 stairHS stretch  -RS      Cueing 3 Verbal; Demo  -RS      Reps 3 3  -RS      Time 3 20  -RS              Exercise 4    Exercise Name 4 SLS  -RS      Reps 4 3 B  -RS      Time 4 10 sec on R  -RS              Exercise 5    Exercise Name 5 LAQ  -RS      Cueing 5 Verbal; Demo  -RS      Sets 5 2  -RS      Reps 5 10  -RS      Time 5 3#  -RS              Exercise 6    Exercise Name 6 Recumbent bike  -RS      Cueing 6 Verbal  -RS      Time 6 5 min  -RS              Exercise 7    Exercise Name 7 Sidestepping  -RS      Cueing 7 Verbal; Demo  -RS      Reps 7 3 laps  -RS      Time 7 RTB  -RS              Exercise 8    Exercise Name 8 SAQ  -RS      Cueing 8 Verbal; Demo  -RS      Sets 8 2  -RS      Reps 8 10  -RS      Time 8 3#  -RS      Additional Comments B LE  -RS              Exercise 9    Exercise Name 9 Standing TKE  -RS      Cueing 9 Verbal; Demo  -RS      Reps 9 15  -RS      Time 9 GTB  -RS              Exercise 10    Exercise Name 10 --  -RS      Cueing 10 --  -RS      Reps 10 --  -RS      Time 10 --  -RS              Exercise 11    Exercise Name 11 STS from elevated shair  -RS      Cueing 11 Verbal; Demo  -RS      Reps 11 10  -RS              Exercise 12    Exercise Name 12 knee flexion stretch  -RS      Cueing 12 Verbal; Demo  -RS      Reps 12 5  -RS      Time 12 20s  -RS              Exercise 13    Exercise Name 13 step up  -RS      Cueing 13 Verbal; Demo  -RS      Reps 13 10ea  -RS      Time 13 6 inch  -RS            User Key  (r) = Recorded By, (t) = Taken By, (c) = Cosigned By    Initials Name Provider Type    RS Ara Norris, PT Physical Therapist                              PT OP Goals     Row Name 01/11/22 1200          PT Short Term Goals    STG Date to Achieve 01/05/22  -RS     STG 1 The pt will demonstrate R knee AROM to at least 0-115degrees flex to facilitate improved stair navigation.  -RS     STG 1 Progress Partially Met  -RS      STG 1 Progress Comments lacking 3-4 degress from full ext actively, 122 flex active  -RS            Long Term Goals    LTG Date to Achieve 02/09/22  -RS     LTG 1 The pt will demonstrate IND and compliant with HEP focused on IND condition management and return to PLOF>  -RS     LTG 1 Progress Ongoing; Progressing  -RS     LTG 1 Progress Comments provided updated copy  -RS     LTG 2 The pt will demonstrate R SLS to at least 6 sec to facilitate improved stair navigation and static balance.  -RS     LTG 2 Progress Met  -RS     LTG 2 Progress Comments 30 sec ea  -RS     LTG 3 The pt will score at least 85% ability on the KOS to indicate improved perceived performance of ADLs.  -RS     LTG 3 Progress Partially Met  -RS     LTG 3 Progress Comments 82  -RS     LTG 4 The pt will resume walking about 1 mile without increased pain in R knee to faciltate recreational activity performance.  -RS     LTG 4 Progress Met  -RS     LTG 4 Progress Comments no pain  -RS     LTG 5 The pt will resume reciprocal stair navigation with single HR to facilitate improved work performance.  -RS     LTG 5 Progress Ongoing; Progressing  -RS           User Key  (r) = Recorded By, (t) = Taken By, (c) = Cosigned By    Initials Name Provider Type    RS Ara Norris PT Physical Therapist                Therapy Education  Education Details: progress toward goals, HEP update< POC  Given: HEP  Program: Reinforced, Progressed  How Provided: Verbal, Demonstration, Written  Provided to: Patient  Level of Understanding: Verbalized, Demonstrated    Outcome Measure Options: Knee Outcome Score- ADL  Knee Outcome Score  Knee Outcome Score Comments: 82% ability      Time Calculation:   Start Time: 1215  Stop Time: 1300  Time Calculation (min): 45 min  Timed Charges  87090 - PT Therapeutic Exercise Minutes: 30  70635 - PT Therapeutic Activity Minutes: 10  Total Minutes  Timed Charges Total Minutes: 40   Total Minutes: 40  Therapy Charges for Today     Code  Description Service Date Service Provider Modifiers Qty    05161877764  PT THER PROC EA 15 MIN 1/11/2022 Ara Norris, PT GP 2    [id number] HC PT THERAPEUTIC ACT EA 15 MIN 1/11/2022 Ara Norris, PT GP 1          PT G-Codes  Outcome Measure Options: Knee Outcome Score- ADL         Ara Norris PT  1/11/2022

## 2022-01-16 NOTE — PROGRESS NOTES
Subjective   Chief Complaint   Patient presents with   • Annual Exam   • Hypertension       History of Present Illness   62 y.o. female presents for annual physical. Feeling good without complaints. Knee is much better and continues PT exercises on her own.     P/P with Dr. Padilla 2020 with repeat advised annually.     CLS with Dr. Alvarez 2021; no polyps with recall advised in 10 years.     No weight loss or gain. Walking some and doing PT exercises.  Eye exam UTD. Having dental work down.     Patient Active Problem List   Diagnosis   • Essential hypertension   • Morbidly obese (HCC)       No Known Allergies    Current Outpatient Medications on File Prior to Visit   Medication Sig Dispense Refill   • losartan (COZAAR) 50 MG tablet TAKE 1 TABLET BY MOUTH EVERY DAY 90 tablet 1   • Multiple Vitamins-Minerals (ICAPS AREDS 2 PO) Take  by mouth.     • Multiple Vitamins-Minerals (MULTI-VITAMIN GUMMIES PO) Take  by mouth.       No current facility-administered medications on file prior to visit.       Past Medical History:   Diagnosis Date   • Abnormal Pap smear of cervix     years ago    • Ankle sprain ,    • Knee swelling 2021       Family History   Problem Relation Age of Onset   • Hypertension Mother    • Diabetes Mother          ; stroke complications   • Diverticulitis Mother    • Heart disease Father    • Hearing loss Father    • Breast cancer Neg Hx    • Ovarian cancer Neg Hx    • Uterine cancer Neg Hx    • Colon cancer Neg Hx        Social History     Socioeconomic History   • Marital status:    Tobacco Use   • Smoking status: Never Smoker   • Smokeless tobacco: Never Used   Vaping Use   • Vaping Use: Never used   Substance and Sexual Activity   • Alcohol use: Yes     Alcohol/week: 3.0 standard drinks     Types: 1 Glasses of wine, 1 Cans of beer, 1 Shots of liquor per week     Comment: occ   • Drug use: Never   • Sexual activity: Yes     Partners: Male     Birth  "control/protection: Post-menopausal       Past Surgical History:   Procedure Laterality Date   • COLONOSCOPY N/A 12/30/2021    Procedure: COLONOSCOPY;  Surgeon: Bishnu Alvarez MD;  Location: [facility] [place] MAIN OR;  Service: Gastroenterology;  Laterality: N/A;  hemorrhoids, diverticulosis,   • WISDOM TOOTH EXTRACTION  1987    bottom       The following portions of the patient's history were reviewed and updated as appropriate: problem list, allergies, current medications, past medical history, past family history, past social history and past surgical history.    Review of Systems   Constitutional: Negative for fatigue.   HENT: Negative for congestion.    Eyes: Negative for visual disturbance.   Respiratory: Negative for shortness of breath.    Cardiovascular: Negative for chest pain.   Gastrointestinal: Negative for blood in stool.   Endocrine: Negative.    Genitourinary: Negative.    Musculoskeletal: Negative for arthralgias.   Skin: Negative for rash.   Allergic/Immunologic: Negative for environmental allergies.   Neurological: Negative for headache.   Hematological: Does not bruise/bleed easily.   Psychiatric/Behavioral: The patient is not nervous/anxious.        Immunization History   Administered Date(s) Administered   • COVID-19 (PFIZER) PURPLE CAP 03/02/2021, 03/23/2021, 10/07/2021   • Flu Vaccine Intradermal Quad 18-64YR 10/06/2020   • Flu Vaccine Quad PF >18YRS 09/23/2021   • Influenza, Unspecified 09/23/2019   • Shingrix 03/02/2020, 06/22/2020   • Tdap 09/23/2019   • flucelvax quad pfs =>4 YRS 09/23/2019, 10/08/2020       Objective   Vitals:    01/21/22 1251   BP: 116/70   Pulse: 84   Resp: 16   Temp: 97.1 °F (36.2 °C)   Weight: 90.3 kg (199 lb)   Height: 160 cm (62.99\")     Body mass index is 35.26 kg/m².  Physical Exam  Vitals reviewed.   Constitutional:       Appearance: Normal appearance. She is well-developed. She is obese.   HENT:      Head: Normocephalic and atraumatic.      Right Ear: Tympanic " membrane, ear canal and external ear normal.      Left Ear: Tympanic membrane, ear canal and external ear normal.      Nose: Nose normal.      Mouth/Throat:      Mouth: Mucous membranes are moist.      Pharynx: Oropharynx is clear. No oropharyngeal exudate or posterior oropharyngeal erythema.   Eyes:      General: No scleral icterus.     Extraocular Movements: Extraocular movements intact.      Conjunctiva/sclera: Conjunctivae normal.      Pupils: Pupils are equal, round, and reactive to light.   Neck:      Thyroid: No thyromegaly.   Cardiovascular:      Rate and Rhythm: Normal rate and regular rhythm.      Heart sounds: Normal heart sounds. No murmur heard.      Pulmonary:      Effort: Pulmonary effort is normal.      Breath sounds: Normal breath sounds.   Abdominal:      General: Bowel sounds are normal. There is no distension.      Palpations: Abdomen is soft.      Tenderness: There is no abdominal tenderness.   Musculoskeletal:      Cervical back: Neck supple.      Comments: Ambulates without assistance; no clubbing, cyanosis or edema.    Lymphadenopathy:      Cervical: No cervical adenopathy.   Skin:     General: Skin is warm and dry.   Neurological:      Mental Status: She is alert.   Psychiatric:         Mood and Affect: Mood normal.         Behavior: Behavior normal.         Procedures    Assessment/Plan   Diagnoses and all orders for this visit:    1. Annual physical exam (Primary)  Comments:  CLS with Dr. Antonio ESQUIVEL. Schedule P/P with Dr. Padilla. Weight loss advised. Flu shot and COVID vaccination and booster completed.   Orders:  -     Comprehensive Metabolic Panel    2. Essential hypertension  Comments:  Controlled. Continue Losartan 50 mg daily.     3. Morbidly obese (HCC)  Comments:  Weight loss via dietary changes and 150 minutes weekly aerobic exercise.     4. Encounter for screening mammogram for malignant neoplasm of breast  -     Mammo Screening Digital Tomosynthesis Bilateral With CAD    Records  reviewed include previous OV with myself as well as labs.     Return in about 6 months (around 7/21/2022) for Lab Today.

## 2022-01-18 ENCOUNTER — HOSPITAL ENCOUNTER (OUTPATIENT)
Dept: PHYSICAL THERAPY | Facility: HOSPITAL | Age: 62
Setting detail: THERAPIES SERIES
Discharge: HOME | End: 2022-01-18

## 2022-01-18 DIAGNOSIS — M25.561 ACUTE PAIN OF RIGHT KNEE: Primary | ICD-10-CM

## 2022-01-18 DIAGNOSIS — R26.2 DIFFICULTY WALKING: ICD-10-CM

## 2022-01-18 PROCEDURE — 97530 THERAPEUTIC ACTIVITIES: CPT

## 2022-01-18 PROCEDURE — 97110 THERAPEUTIC EXERCISES: CPT

## 2022-01-18 NOTE — THERAPY TREATMENT NOTE
Outpatient Physical Therapy Ortho Treatment Note  [facility]     Patient Name: Dudley Estrada  : 1960  MRN: [id number]  Today's Date: 2022      Visit Date: 2022    Visit Dx:    ICD-10-CM ICD-9-CM   1. Acute pain of right knee  M25.561 719.46   2. Difficulty walking  R26.2 719.7       Patient Active Problem List   Diagnosis   • Essential hypertension   • Morbidly obese (HCC)   • Encounter for screening for malignant neoplasm of colon        Past Medical History:   Diagnosis Date   • Abnormal Pap smear of cervix     years ago    • Ankle sprain ,    • Knee swelling 2021        Past Surgical History:   Procedure Laterality Date   • COLONOSCOPY N/A 2021    Procedure: COLONOSCOPY;  Surgeon: Bishnu Alvarez MD;  Location: [facility] [place] MAIN OR;  Service: Gastroenterology;  Laterality: N/A;  hemorrhoids, diverticulosis,   • WISDOM TOOTH EXTRACTION      bottom                        PT Assessment/Plan     Row Name 22 1300          PT Assessment    Assessment Comments Ms. Estrada reports good tolerance for last session with muscle soreness in expected areas (quad) that lasted less than 24 hours. Continued with current program and did not progress resistance during LAQ/SAQ, added bridge, lateral step up, tandem walking, SLS compliant surface, and small step down all with good tolerance. Did not progress HEP, advised pt to continue with current program 3x per week, pt receptv and reports good compliance with current program. She continus to progress well toward functional goals.  -RS            PT Plan    PT Plan Comments larger step down/heel tap, mini lunge matrix  -RS           User Key  (r) = Recorded By, (t) = Taken By, (c) = Cosigned By    Initials Name Provider Type    RS Ara Norris, PT Physical Therapist                   OP Exercises     Row Name 22 1200             Subjective Comments    Subjective Comments Felt sore after last time for the following day,  overall still feeling good. was able to close a drawer with her knee without pain and didnt think about it  -RS              Subjective Pain    Able to rate subjective pain? yes  -RS      Pre-Treatment Pain Level 0  -RS              Total Minutes    68806 - PT Therapeutic Exercise Minutes 30  -RS      23306 - PT Therapeutic Activity Minutes 10  -RS              Exercise 1    Exercise Name 1 bridge  -RS      Cueing 1 Verbal; Demo  -RS      Reps 1 15  -RS      Time 1 5s  -RS              Exercise 2    Exercise Name 2 small step down foam  -RS      Cueing 2 Verbal; Demo  -RS      Reps 2 15 ea  -RS              Exercise 3    Exercise Name 3 tandem walking  -RS      Cueing 3 Verbal; Demo  -RS      Reps 3 3 laps  -RS      Time 3 --  -RS              Exercise 4    Exercise Name 4 SLS  -RS      Sets 4 3  -RS      Reps 4 20s  -RS      Time 4 --  -RS      Additional Comments foam  -RS              Exercise 5    Exercise Name 5 LAQ  -RS      Cueing 5 Verbal; Demo  -RS      Sets 5 2  -RS      Reps 5 10  -RS      Time 5 3#  -RS              Exercise 6    Exercise Name 6 Recumbent bike  -RS      Cueing 6 Verbal  -RS      Time 6 5 min  -RS              Exercise 7    Exercise Name 7 Sidestepping  -RS      Cueing 7 Verbal; Demo  -RS      Reps 7 3 laps  -RS      Time 7 GTB below knees  -RS              Exercise 8    Exercise Name 8 SAQ  -RS      Cueing 8 Verbal; Demo  -RS      Sets 8 2  -RS      Reps 8 10  -RS      Time 8 3#  -RS      Additional Comments B LE lower slowly  -RS              Exercise 9    Exercise Name 9 --  -RS      Cueing 9 --  -RS      Reps 9 --  -RS      Time 9 --  -RS              Exercise 11    Exercise Name 11 STS from mat  -RS      Cueing 11 Verbal; Demo  -RS      Sets 11 2  -RS      Reps 11 10  -RS              Exercise 12    Exercise Name 12 knee flexion stretch  -RS      Cueing 12 Verbal; Demo  -RS      Reps 12 5  -RS      Time 12 20s  -RS              Exercise 13    Exercise Name 13 step up  -RS      Cueing  13 Verbal; Demo  -RS      Sets 13 2  -RS      Reps 13 10ea  -RS      Time 13 6 inch  -RS      Additional Comments front and side- cue to lower slowly  -RS            User Key  (r) = Recorded By, (t) = Taken By, (c) = Cosigned By    Initials Name Provider Type    RS Ara Norris PT Physical Therapist                              PT OP Goals     Row Name 01/18/22 1300          PT Short Term Goals    STG Date to Achieve 01/05/22  -RS     STG 1 The pt will demonstrate R knee AROM to at least 0-115degrees flex to facilitate improved stair navigation.  -RS     STG 1 Progress Partially Met  -RS            Long Term Goals    LTG Date to Achieve 02/09/22  -RS     LTG 1 The pt will demonstrate IND and compliant with HEP focused on IND condition management and return to PLOF>  -RS     LTG 1 Progress Ongoing; Progressing  -RS     LTG 2 The pt will demonstrate R SLS to at least 6 sec to facilitate improved stair navigation and static balance.  -RS     LTG 2 Progress Met  -RS     LTG 3 The pt will score at least 85% ability on the KOS to indicate improved perceived performance of ADLs.  -RS     LTG 3 Progress Partially Met  -RS     LTG 4 The pt will resume walking about 1 mile without increased pain in R knee to faciltate recreational activity performance.  -RS     LTG 4 Progress Met  -RS     LTG 5 The pt will resume reciprocal stair navigation with single HR to facilitate improved work performance.  -RS     LTG 5 Progress Ongoing; Progressing  -RS           User Key  (r) = Recorded By, (t) = Taken By, (c) = Cosigned By    Initials Name Provider Type    Ara Das PT Physical Therapist                Therapy Education  Education Details: HEP- power and control  Program: Reinforced  How Provided: Verbal, Demonstration  Provided to: Patient  Level of Understanding: Verbalized, Demonstrated              Time Calculation:   Start Time: 1215  Stop Time: 1259  Time Calculation (min): 44 min  Timed Charges  50922 - PT  Therapeutic Exercise Minutes: 30  61372 - PT Therapeutic Activity Minutes: 10  Total Minutes  Timed Charges Total Minutes: 40   Total Minutes: 40  Therapy Charges for Today     Code Description Service Date Service Provider Modifiers Qty    53388272605  PT THER PROC EA 15 MIN 1/18/2022 Ara Norris, PT GP 2    [id number]  PT THERAPEUTIC ACT EA 15 MIN 1/18/2022 Ara Norris, PT GP 1                    Ara Norris PT  1/18/2022

## 2022-01-21 ENCOUNTER — OFFICE VISIT (OUTPATIENT)
Dept: INTERNAL MEDICINE | Facility: CLINIC | Age: 62
End: 2022-01-21

## 2022-01-21 VITALS
HEART RATE: 84 BPM | RESPIRATION RATE: 16 BRPM | BODY MASS INDEX: 35.26 KG/M2 | DIASTOLIC BLOOD PRESSURE: 70 MMHG | WEIGHT: 199 LBS | HEIGHT: 63 IN | TEMPERATURE: 97.1 F | SYSTOLIC BLOOD PRESSURE: 116 MMHG

## 2022-01-21 DIAGNOSIS — E66.01 MORBIDLY OBESE: Chronic | ICD-10-CM

## 2022-01-21 DIAGNOSIS — Z12.31 ENCOUNTER FOR SCREENING MAMMOGRAM FOR MALIGNANT NEOPLASM OF BREAST: ICD-10-CM

## 2022-01-21 DIAGNOSIS — I10 ESSENTIAL HYPERTENSION: ICD-10-CM

## 2022-01-21 DIAGNOSIS — Z00.00 ANNUAL PHYSICAL EXAM: Primary | ICD-10-CM

## 2022-01-21 PROCEDURE — 99396 PREV VISIT EST AGE 40-64: CPT | Performed by: NURSE PRACTITIONER

## 2022-01-22 LAB
ALBUMIN SERPL-MCNC: 4.1 G/DL (ref 3.8–4.8)
ALBUMIN/GLOB SERPL: 1.5 {RATIO} (ref 1.2–2.2)
ALP SERPL-CCNC: 152 IU/L (ref 44–121)
ALT SERPL-CCNC: 11 IU/L (ref 0–32)
AST SERPL-CCNC: 11 IU/L (ref 0–40)
BILIRUB SERPL-MCNC: <0.2 MG/DL (ref 0–1.2)
BUN SERPL-MCNC: 13 MG/DL (ref 8–27)
BUN/CREAT SERPL: 14 (ref 12–28)
CALCIUM SERPL-MCNC: 8.8 MG/DL (ref 8.7–10.3)
CHLORIDE SERPL-SCNC: 102 MMOL/L (ref 96–106)
CO2 SERPL-SCNC: 24 MMOL/L (ref 20–29)
CREAT SERPL-MCNC: 0.92 MG/DL (ref 0.57–1)
GLOBULIN SER CALC-MCNC: 2.7 G/DL (ref 1.5–4.5)
GLUCOSE SERPL-MCNC: 79 MG/DL (ref 65–99)
POTASSIUM SERPL-SCNC: 4.3 MMOL/L (ref 3.5–5.2)
PROT SERPL-MCNC: 6.8 G/DL (ref 6–8.5)
SODIUM SERPL-SCNC: 140 MMOL/L (ref 134–144)

## 2022-01-25 ENCOUNTER — HOSPITAL ENCOUNTER (OUTPATIENT)
Dept: PHYSICAL THERAPY | Facility: HOSPITAL | Age: 62
Setting detail: THERAPIES SERIES
Discharge: HOME | End: 2022-01-25

## 2022-01-25 DIAGNOSIS — R26.2 DIFFICULTY WALKING: ICD-10-CM

## 2022-01-25 DIAGNOSIS — M25.561 ACUTE PAIN OF RIGHT KNEE: Primary | ICD-10-CM

## 2022-01-25 PROCEDURE — 97530 THERAPEUTIC ACTIVITIES: CPT

## 2022-01-25 PROCEDURE — 97110 THERAPEUTIC EXERCISES: CPT

## 2022-01-25 NOTE — THERAPY TREATMENT NOTE
Outpatient Physical Therapy Ortho Treatment Note  [facility]     Patient Name: Dudley Estrada  : 1960  MRN: [id number]  Today's Date: 2022      Visit Date: 2022    Visit Dx:    ICD-10-CM ICD-9-CM   1. Acute pain of right knee  M25.561 719.46   2. Difficulty walking  R26.2 719.7       Patient Active Problem List   Diagnosis   • Essential hypertension   • Morbidly obese (HCC)        Past Medical History:   Diagnosis Date   • Abnormal Pap smear of cervix     years ago    • Ankle sprain ,    • Knee swelling 2021        Past Surgical History:   Procedure Laterality Date   • COLONOSCOPY N/A 2021    Procedure: COLONOSCOPY;  Surgeon: Bishnu Alvarez MD;  Location: [facility] MAIN OR;  Service: Gastroenterology;  Laterality: N/A;  hemorrhoids, diverticulosis,   • WISDOM TOOTH EXTRACTION      bottom                        PT Assessment/Plan     Row Name 22 1300          PT Assessment    Assessment Comments Ms. Estrada is progressing well toward functional goals and reports return to near full PLOF with little to no pain. She was able to navigate her deck stairs and to kneel without increased pain or thinking about her knee pain. Added mini squats tapping hips back on chair with cues for appropriate weight in heels, increased step down height, and progressed HEP to include heel raises, tandem walking, heel taps, pt receptive.Pt is approaching DC to HEP as she has nearly met all functional goals and reports return to greater than 90% PLOF.  -RS            PT Plan    PT Plan Comments Finalize HEP, consider DC toHEP  -RS           User Key  (r) = Recorded By, (t) = Taken By, (c) = Cosigned By    Initials Name Provider Type    RS Ara Norris PT Physical Therapist                   OP Exercises     Row Name 22 1200             Subjective Comments    Subjective Comments Pt reports muscle soreness after last time, no pain, feeling back to greater than 90% PLOF  -RS               Subjective Pain    Able to rate subjective pain? yes  -RS      Pre-Treatment Pain Level 0  -RS              Total Minutes    05715 - PT Therapeutic Exercise Minutes 25  -RS      44400 - PT Therapeutic Activity Minutes 13  -RS              Exercise 1    Exercise Name 1 mini squat  -RS      Cueing 1 Verbal; Demo  -RS      Reps 1 2  -RS      Time 1 10  -RS      Additional Comments cues for hips back and weight in eels  -RS              Exercise 2    Exercise Name 2 step down front and side  -RS      Cueing 2 Verbal; Demo  -RS      Sets 2 2  -RS      Reps 2 10  -RS      Time 2 front and lateral  -RS      Additional Comments 6 inch  -RS              Exercise 3    Exercise Name 3 tandem walking  -RS      Cueing 3 Verbal; Demo  -RS      Reps 3 3 laps  -RS              Exercise 4    Exercise Name 4 SLS  -RS      Sets 4 3  -RS      Reps 4 20s  -RS      Additional Comments foam  -RS              Exercise 5    Exercise Name 5 LAQ  -RS      Cueing 5 Verbal; Demo  -RS      Sets 5 2  -RS      Reps 5 10  -RS      Time 5 4#  -RS              Exercise 6    Exercise Name 6 Recumbent bike  -RS      Cueing 6 Verbal  -RS      Time 6 5 min  -RS              Exercise 7    Exercise Name 7 Sidestepping  -RS      Cueing 7 Verbal; Demo  -RS      Reps 7 3 laps  -RS      Time 7 GTB below knees  -RS              Exercise 8    Exercise Name 8 HR  -RS      Cueing 8 Verbal; Demo  -RS      Sets 8 2  -RS      Reps 8 15  -RS      Time 8 --  -RS              Exercise 11    Exercise Name 11 --  -RS      Cueing 11 --  -RS      Sets 11 --  -RS      Reps 11 --  -RS              Exercise 12    Exercise Name 12 knee flexion stretch  -RS      Cueing 12 Verbal; Demo  -RS      Reps 12 5  -RS      Time 12 20s  -RS              Exercise 13    Exercise Name 13 step up  -RS      Cueing 13 Verbal; Demo  -RS      Sets 13 2  -RS      Reps 13 10ea  -RS      Time 13 8 inch  -RS      Additional Comments front and side- cue to lower slowly  -RS            User Key   (r) = Recorded By, (t) = Taken By, (c) = Cosigned By    Initials Name Provider Type    Ara Das PT Physical Therapist                              PT OP Goals     Row Name 01/25/22 1200          PT Short Term Goals    STG Date to Achieve 01/05/22  -RS     STG 1 The pt will demonstrate R knee AROM to at least 0-115degrees flex to facilitate improved stair navigation.  -RS     STG 1 Progress Partially Met  -RS            Long Term Goals    LTG Date to Achieve 02/09/22  -RS     LTG 1 The pt will demonstrate IND and compliant with HEP focused on IND condition management and return to PLOF>  -RS     LTG 1 Progress Ongoing; Progressing  -RS     LTG 2 The pt will demonstrate R SLS to at least 6 sec to facilitate improved stair navigation and static balance.  -RS     LTG 2 Progress Met  -RS     LTG 3 The pt will score at least 85% ability on the KOS to indicate improved perceived performance of ADLs.  -RS     LTG 3 Progress Partially Met  -RS     LTG 4 The pt will resume walking about 1 mile without increased pain in R knee to faciltate recreational activity performance.  -RS     LTG 4 Progress Met  -RS     LTG 5 The pt will resume reciprocal stair navigation with single HR to facilitate improved work performance.  -RS     LTG 5 Progress Ongoing; Progressing  -RS           User Key  (r) = Recorded By, (t) = Taken By, (c) = Cosigned By    Initials Name Provider Type    Ara Das PT Physical Therapist                Therapy Education  Education Details: HEP update  Program: Reinforced, Progressed  How Provided: Verbal, Demonstration, Written  Provided to: Patient  Level of Understanding: Verbalized, Demonstrated              Time Calculation:   Start Time: 1215  Stop Time: 1258  Time Calculation (min): 43 min  Timed Charges  01802 - PT Therapeutic Exercise Minutes: 25  28258 - PT Therapeutic Activity Minutes: 13  Total Minutes  Timed Charges Total Minutes: 38   Total Minutes: 38  Therapy Charges for  Today     Code Description Service Date Service Provider Modifiers Qty    18511952689  PT THERAPEUTIC ACT EA 15 MIN 1/25/2022 Ara Norris, PT GP 1    97806724962 HC PT THER PROC EA 15 MIN 1/25/2022 Ara Norris, PT GP 2                    Ara Norris, PT  1/25/2022

## 2022-01-28 DIAGNOSIS — Z78.0 POST-MENOPAUSAL: Primary | ICD-10-CM

## 2022-02-01 ENCOUNTER — HOSPITAL ENCOUNTER (OUTPATIENT)
Dept: PHYSICAL THERAPY | Facility: HOSPITAL | Age: 62
Setting detail: THERAPIES SERIES
Discharge: HOME | End: 2022-02-01

## 2022-02-01 DIAGNOSIS — M25.561 ACUTE PAIN OF RIGHT KNEE: Primary | ICD-10-CM

## 2022-02-01 DIAGNOSIS — R26.2 DIFFICULTY WALKING: ICD-10-CM

## 2022-02-01 PROCEDURE — 97530 THERAPEUTIC ACTIVITIES: CPT

## 2022-02-01 NOTE — THERAPY DISCHARGE NOTE
Outpatient Physical Therapy Ortho Treatment Note/Discharge Summary  [facility]     Patient Name: Dudley Estrada  : 1960  MRN: [id number]  Today's Date: 2022      Visit Date: 2022    Visit Dx:    ICD-10-CM ICD-9-CM   1. Acute pain of right knee  M25.561 719.46   2. Difficulty walking  R26.2 719.7       Patient Active Problem List   Diagnosis   • Essential hypertension   • Morbidly obese (HCC)        Past Medical History:   Diagnosis Date   • Abnormal Pap smear of cervix     years ago    • Ankle sprain ,    • Knee swelling 2021        Past Surgical History:   Procedure Laterality Date   • COLONOSCOPY N/A 2021    Procedure: COLONOSCOPY;  Surgeon: Bishnu Alvarez MD;  [place] [facility];  Service: Gastroenterology;  Laterality: N/A;  hemorrhoids, diverticulosis,   • WISDOM TOOTH EXTRACTION      bottom                        PT Assessment/Plan     Row Name 22 1300          PT Assessment    Assessment Comments Ms. Estrada has been seen by PT fpr R knee pain for 6 sessions, she has met all short and long term goals. She reports pain has improved overall and she is able to navigate stairs reciprocally and walk without inc pain. She demonstrates excellent compliance with HEP and is agreeable to current DC doe. Reviewed tissue healing, DC plan, HEP, and return to PT if need be, pt receptive. At this time, plan to DC from HEP, advised pt to return if pain returns/increases.  -RS           User Key  (r) = Recorded By, (t) = Taken By, (c) = Cosigned By    Initials Name Provider Type    RS Ara Norris, PT Physical Therapist                     OP Exercises     Row Name 22 1200             Subjective Comments    Subjective Comments Pt reports some knee pain for a full day after last time, it is near baseline today but felt different than before.  -RS              Subjective Pain    Able to rate subjective pain? yes  -RS      Pre-Treatment Pain Level 0  -RS               Total Minutes    15342 - PT Therapeutic Activity Minutes 25  -RS              Exercise 1    Exercise Name 1 mini squat  -RS      Cueing 1 Verbal; Demo  -RS      Reps 1 2  -RS      Time 1 10  -RS      Additional Comments cues for hips back and weight in heels  -RS              Exercise 3    Exercise Name 3 tandem walking  -RS      Cueing 3 Verbal; Demo  -RS      Reps 3 3 laps  -RS              Exercise 4    Exercise Name 4 SLS  -RS      Sets 4 3  -RS      Reps 4 20s  -RS      Additional Comments foam  -RS              Exercise 5    Exercise Name 5 --  -RS      Cueing 5 --  -RS      Sets 5 --  -RS      Reps 5 --  -RS      Time 5 --  -RS              Exercise 6    Exercise Name 6 Recumbent bike  -RS      Cueing 6 Verbal  -RS      Time 6 5 min  -RS              Exercise 7    Exercise Name 7 Sidestepping  -RS      Cueing 7 Verbal; Demo  -RS      Reps 7 3 laps  -RS      Time 7 GTB below knees  -RS              Exercise 8    Exercise Name 8 HR  -RS      Cueing 8 Verbal; Demo  -RS      Sets 8 2  -RS      Reps 8 15  -RS              Exercise 12    Exercise Name 12 --  -RS      Cueing 12 --  -RS      Reps 12 --  -RS      Time 12 --  -RS              Exercise 13    Exercise Name 13 step up  -RS      Cueing 13 Verbal; Demo  -RS      Sets 13 2  -RS      Reps 13 10ea  -RS      Time 13 6 inch  -RS      Additional Comments front and side- cue to lower slowly  -RS            User Key  (r) = Recorded By, (t) = Taken By, (c) = Cosigned By    Initials Name Provider Type    RS Ara Norris, PT Physical Therapist                                PT OP Goals     Row Name 02/01/22 1300          PT Short Term Goals    STG Date to Achieve 01/05/22  -RS     STG 1 The pt will demonstrate R knee AROM to at least 0-115degrees flex to facilitate improved stair navigation.  -RS     STG 1 Progress Partially Met  -RS     STG 1 Progress Comments 118 flex  -RS            Long Term Goals    LTG Date to Achieve 02/09/22  -RS     LTG 1 The  pt will demonstrate IND and compliant with HEP focused on IND condition management and return to PLOF>  -RS     LTG 1 Progress Met  -RS     LTG 2 The pt will demonstrate R SLS to at least 6 sec to facilitate improved stair navigation and static balance.  -RS     LTG 2 Progress Met  -RS     LTG 3 The pt will score at least 85% ability on the KOS to indicate improved perceived performance of ADLs.  -RS     LTG 3 Progress Met  -RS     LTG 3 Progress Comments 95% ability  -RS     LTG 4 The pt will resume walking about 1 mile without increased pain in R knee to faciltate recreational activity performance.  -RS     LTG 4 Progress Met  -RS     LTG 5 The pt will resume reciprocal stair navigation with single HR to facilitate improved work performance.  -RS     LTG 5 Progress Met  -RS           User Key  (r) = Recorded By, (t) = Taken By, (c) = Cosigned By    Initials Name Provider Type    RS Ara Norris, PT Physical Therapist                Therapy Education  Education Details: DC plan, tissue healing, HEP  Program: Reinforced  How Provided: Verbal, Demonstration  Provided to: Patient  Level of Understanding: Verbalized, Demonstrated              Time Calculation:   Start Time: 1220  Stop Time: 1249  Time Calculation (min): 29 min  Timed Charges  89131 - PT Therapeutic Activity Minutes: 25  Total Minutes  Timed Charges Total Minutes: 25   Total Minutes: 25  Therapy Charges for Today     Code Description Service Date Service Provider Modifiers Qty    61043103721  PT THERAPEUTIC ACT EA 15 MIN 2/1/2022 Ara Norris, PT GP 2                OP PT Discharge Summary  Date of Discharge: 02/01/22  Reason for Discharge: All goals achieved  Outcomes Achieved: Able to achieve all goals within established timeline  Discharge Destination: Home with home program  Discharge Instructions/Additional Comments: Ms. Estrada has been seen by PT fpr R knee pain for 6 sessions, she has met all short and long term goals. She reports  pain has improved overall and she is able to navigate stairs reciprocally and walk without inc pain. She demonstrates excellent compliance with HEP and is agreeable to current DC doe. Reviewed tissue healing, DC plan, HEP, and return to PT if need be, pt receptive. At this time, plan to DC from HEP, advised pt to return if pain returns/increases.      Ara Norris, PT  2/1/2022

## 2022-02-06 DIAGNOSIS — I10 ESSENTIAL HYPERTENSION: ICD-10-CM

## 2022-02-07 RX ORDER — LOSARTAN POTASSIUM 50 MG/1
TABLET ORAL
Qty: 90 TABLET | Refills: 1 | Status: SHIPPED | OUTPATIENT
Start: 2022-02-07 | End: 2022-07-22 | Stop reason: SDUPTHER

## 2022-06-06 ENCOUNTER — HOSPITAL ENCOUNTER (OUTPATIENT)
Dept: BONE DENSITY | Facility: HOSPITAL | Age: 62
Discharge: HOME | End: 2022-06-06

## 2022-06-06 ENCOUNTER — HOSPITAL ENCOUNTER (OUTPATIENT)
Dept: MAMMOGRAPHY | Facility: HOSPITAL | Age: 62
Discharge: HOME | End: 2022-06-06

## 2022-06-06 DIAGNOSIS — Z78.0 POST-MENOPAUSAL: ICD-10-CM

## 2022-06-06 PROCEDURE — 77080 DXA BONE DENSITY AXIAL: CPT

## 2022-06-06 PROCEDURE — 77063 BREAST TOMOSYNTHESIS BI: CPT

## 2022-06-06 PROCEDURE — 77067 SCR MAMMO BI INCL CAD: CPT

## 2022-07-15 DIAGNOSIS — R74.8 ELEVATED LIVER ENZYMES: Primary | ICD-10-CM

## 2022-07-17 PROBLEM — E66.9 OBESITY (BMI 30-39.9): Status: ACTIVE | Noted: 2021-01-18

## 2022-07-17 PROBLEM — E66.9 OBESITY (BMI 30-39.9): Chronic | Status: ACTIVE | Noted: 2021-01-18

## 2022-07-17 PROBLEM — I10 ESSENTIAL HYPERTENSION: Chronic | Status: ACTIVE | Noted: 2019-12-19

## 2022-07-17 NOTE — PROGRESS NOTES
Subjective   Chief Complaint   Patient presents with   • Hypertension       History of Present Illness   62 y.o. female presents for follow up regarding HTN, elevated alk phosphatase, and obesity. Took a bad step on the deck and went through a board up to her left leg above the knee. She has bruising and a knot on her on her left. Ambulating without difficulty. Some bruising and scrapes. Tdap UTD in 2019.     Denies CP or dyspnea. Denies ever having a murmur. Occasional abd pain after eating as well as diarrhea. Unable to identify foods causing it or frequency.  Denies N/V. Still has her gallbladder. Her sister had similar issues and had her GB removed.     Recently spent time in [place] and [facility].                  Patient Active Problem List   Diagnosis   • Essential hypertension   • Obesity (BMI 30-39.9)       No Known Allergies    Current Outpatient Medications on File Prior to Visit   Medication Sig Dispense Refill   • Multiple Vitamins-Minerals (ICAPS AREDS 2 PO) Take  by mouth.     • Multiple Vitamins-Minerals (MULTI-VITAMIN GUMMIES PO) Take  by mouth.       No current facility-administered medications on file prior to visit.       Past Medical History:   Diagnosis Date   • Abnormal Pap smear of cervix     years ago    • Ankle sprain ,    • Knee swelling 2021       Family History   Problem Relation Age of Onset   • Hypertension Mother    • Diabetes Mother          ; stroke complications   • Diverticulitis Mother    • Heart disease Father    • Hearing loss Father    • Gallbladder disease Sister    • Diverticulitis Sister    • Breast cancer Neg Hx    • Ovarian cancer Neg Hx    • Uterine cancer Neg Hx    • Colon cancer Neg Hx        Social History     Socioeconomic History   • Marital status:    Tobacco Use   • Smoking status: Never Smoker   • Smokeless tobacco: Never Used   Vaping Use   • Vaping Use: Never used   Substance and Sexual Activity   • Alcohol use: Yes     Alcohol/week:  "3.0 standard drinks     Types: 1 Glasses of wine, 1 Cans of beer, 1 Shots of liquor per week     Comment: occ   • Drug use: Never   • Sexual activity: Yes     Partners: Male     Birth control/protection: Post-menopausal       Past Surgical History:   Procedure Laterality Date   • COLONOSCOPY N/A 12/30/2021    Procedure: COLONOSCOPY;  Surgeon: Bishnu Alvarez MD;  Location: [facility] OR;  Service: Gastroenterology;  Laterality: N/A;  hemorrhoids, diverticulosis,   • WISDOM TOOTH EXTRACTION  1987    bottom       The following portions of the patient's history were reviewed and updated as appropriate: problem list, allergies, current medications, past medical history and past social history.    Review of Systems    Immunization History   Administered Date(s) Administered   • COVID-19 (PFIZER) PURPLE CAP 03/02/2021, 03/23/2021, 10/07/2021   • Covid-19 (Pfizer) Gray Cap 05/21/2022   • Flu Vaccine Intradermal Quad 18-64YR 10/06/2020   • Fluzone Quad >6mos (Multi-dose) 09/23/2021   • Influenza, Unspecified 09/23/2019   • Shingrix 03/02/2020, 06/22/2020   • Tdap 09/23/2019   • flucelvax quad pfs =>4 YRS 09/23/2019, 10/08/2020       Objective   Vitals:    07/22/22 1313   BP: 118/68   Pulse: 84   Resp: 14   Temp: 97.5 °F (36.4 °C)   Weight: 90.3 kg (199 lb)   Height: 160 cm (63\")     Body mass index is 35.25 kg/m².  Physical Exam  Vitals reviewed.   Constitutional:       Appearance: Normal appearance. She is well-developed.   HENT:      Head: Normocephalic and atraumatic.   Cardiovascular:      Rate and Rhythm: Normal rate and regular rhythm.      Heart sounds: S1 normal and S2 normal. Murmur heard.   Pulmonary:      Effort: Pulmonary effort is normal.      Breath sounds: Normal breath sounds.   Abdominal:      General: Bowel sounds are normal. There is no distension.      Palpations: Abdomen is soft.      Tenderness: There is no abdominal tenderness.   Musculoskeletal:      Comments: Ambulates without assistance; no " clubbing, cyanosis or edema. Left leg above knee with some scrapes and bruising. No swelling or erythema. FROM knee.    Skin:     General: Skin is warm and dry.   Neurological:      Mental Status: She is alert.   Psychiatric:         Mood and Affect: Mood normal.         Behavior: Behavior normal.         Procedures    Assessment & Plan   Diagnoses and all orders for this visit:    1. Essential hypertension (Primary)  Comments:  Controlled. Continue Losartan 50 mg daily.  Orders:  -     losartan (COZAAR) 50 MG tablet; Take 1 tablet by mouth Daily.  Dispense: 90 tablet; Refill: 1  -     Comprehensive Metabolic Panel; Future    2. Cardiac murmur  Comments:  New problem. Schedule echo as below.   Orders:  -     Adult Transthoracic Echo Complete W/ Cont if Necessary Per Protocol    3. Obesity (BMI 30-39.9)  Comments:  Weight loss via dietary changes and 150 minutes weekly aerobic exercise.     4. Elevated alkaline phosphatase level  -     US Liver    5. Healthcare maintenance  -     Lipid Panel With / Chol / HDL Ratio; Future    Records reviewed include previous OV with myself as well as labs.     Return in about 6 months (around 1/22/2023) for Medicare Wellness, Lab B4 FUP.

## 2022-07-20 LAB
ALBUMIN SERPL-MCNC: 4 G/DL (ref 3.8–4.8)
ALP SERPL-CCNC: 143 IU/L (ref 44–121)
ALT SERPL-CCNC: 13 IU/L (ref 0–32)
AST SERPL-CCNC: 12 IU/L (ref 0–40)
BILIRUB DIRECT SERPL-MCNC: <0.1 MG/DL (ref 0–0.4)
BILIRUB SERPL-MCNC: 0.2 MG/DL (ref 0–1.2)
GGT SERPL-CCNC: 9 IU/L (ref 0–60)
PROT SERPL-MCNC: 6.6 G/DL (ref 6–8.5)

## 2022-07-22 ENCOUNTER — OFFICE VISIT (OUTPATIENT)
Dept: INTERNAL MEDICINE | Facility: CLINIC | Age: 62
End: 2022-07-22

## 2022-07-22 VITALS
SYSTOLIC BLOOD PRESSURE: 118 MMHG | HEART RATE: 84 BPM | RESPIRATION RATE: 14 BRPM | DIASTOLIC BLOOD PRESSURE: 68 MMHG | WEIGHT: 199 LBS | BODY MASS INDEX: 35.26 KG/M2 | HEIGHT: 63 IN | TEMPERATURE: 97.5 F

## 2022-07-22 DIAGNOSIS — I10 ESSENTIAL HYPERTENSION: Primary | Chronic | ICD-10-CM

## 2022-07-22 DIAGNOSIS — E66.9 OBESITY (BMI 30-39.9): Chronic | ICD-10-CM

## 2022-07-22 DIAGNOSIS — Z00.00 HEALTHCARE MAINTENANCE: ICD-10-CM

## 2022-07-22 DIAGNOSIS — R74.8 ELEVATED ALKALINE PHOSPHATASE LEVEL: ICD-10-CM

## 2022-07-22 DIAGNOSIS — R01.1 CARDIAC MURMUR: ICD-10-CM

## 2022-07-22 PROCEDURE — 99214 OFFICE O/P EST MOD 30 MIN: CPT | Performed by: NURSE PRACTITIONER

## 2022-07-22 RX ORDER — LOSARTAN POTASSIUM 50 MG/1
50 TABLET ORAL DAILY
Qty: 90 TABLET | Refills: 1 | Status: SHIPPED | OUTPATIENT
Start: 2022-07-22 | End: 2023-01-09

## 2022-08-26 ENCOUNTER — TELEPHONE (OUTPATIENT)
Dept: INTERNAL MEDICINE | Facility: CLINIC | Age: 62
End: 2022-08-26

## 2022-08-26 NOTE — TELEPHONE ENCOUNTER
- Status post fall with the below noted injuries  - Fall precautions  - Geriatric Medicine consultation for evaluation, medication review and recommendations appreciated  - PT and OT evaluation and treatment as indicated  Rehab vs home, pending progress  - Case Management consultation for disposition planning  Patient's Echo has been denied per scheduling, [facility] is recommending a peer to peer [phone], please refer to case number # [id number], patient is scheduled for 8/30/22 here at [facility].

## 2022-08-27 NOTE — TELEPHONE ENCOUNTER
Yes--refreshed office note for that day as my notes under A&P did not carry over. This is a new murmur. Please send over refreshed note and if still needed then I will do peer to peer over lunch

## 2022-08-29 ENCOUNTER — TELEPHONE (OUTPATIENT)
Dept: INTERNAL MEDICINE | Facility: CLINIC | Age: 62
End: 2022-08-29

## 2022-08-29 NOTE — TELEPHONE ENCOUNTER
Spoke with   PA department ref # [id number]  Faxed new note over along with ref number and case id #.

## 2022-08-29 NOTE — TELEPHONE ENCOUNTER
Lm for Gemma, there is a duplicate message. We resubmitted office notes this morning ref number [id number]

## 2022-08-29 NOTE — TELEPHONE ENCOUNTER
UNABLE TO WARM TRANSFER    GEMMA, WITH  AUTH'[facility], IS CALLING TO CHECK STATUS. SHE IS NEEDING TO KNOW IF PEER TO PEER WILL BE DONE TODAY OR IF ECHO NEEDS TO BE CANCELLED TOMORROW. PLEASE CALL [phone]

## 2022-08-30 ENCOUNTER — HOSPITAL ENCOUNTER (OUTPATIENT)
Dept: ULTRASOUND IMAGING | Facility: HOSPITAL | Age: 62
Discharge: HOME | End: 2022-08-30
Admitting: NURSE PRACTITIONER

## 2022-08-30 ENCOUNTER — APPOINTMENT (OUTPATIENT)
Dept: CARDIOLOGY | Facility: HOSPITAL | Age: 62
End: 2022-08-30

## 2022-08-30 DIAGNOSIS — K80.20 GALLSTONES: Primary | ICD-10-CM

## 2022-08-30 PROCEDURE — 76705 ECHO EXAM OF ABDOMEN: CPT

## 2022-09-22 ENCOUNTER — OFFICE VISIT (OUTPATIENT)
Dept: SURGERY | Facility: CLINIC | Age: 62
End: 2022-09-22

## 2022-09-22 VITALS — WEIGHT: 195.4 LBS | BODY MASS INDEX: 34.62 KG/M2 | HEIGHT: 63 IN

## 2022-09-22 DIAGNOSIS — K80.20 CALCULUS OF GALLBLADDER WITHOUT CHOLECYSTITIS WITHOUT OBSTRUCTION: Primary | ICD-10-CM

## 2022-09-22 PROCEDURE — 99203 OFFICE O/P NEW LOW 30 MIN: CPT | Performed by: SURGERY

## 2022-09-22 RX ORDER — GUAIFENESIN 600 MG/1
1200 TABLET, EXTENDED RELEASE ORAL 2 TIMES DAILY
COMMUNITY
End: 2022-11-07

## 2022-09-26 NOTE — PROGRESS NOTES
General Surgery  Initial Office Visit    CC: Gallstones    HPI: The patient is a pleasant 62 y.o. year-old lady who presents today for evaluation of gallstones that were found recently on gallbladder ultrasound.  She has had a persistent elevation of her alkaline phosphatase with no other elevation of her AST, ALT, or bilirubin level.  A right upper quadrant ultrasound was then performed to assess her elevated alkaline phosphatase and found a 1.9 cm gallstone with no evidence for cholecystitis.  She was referred to see me to discuss cholecystectomy.  She denies any right upper quadrant postprandial abdominal pain, nausea, or vomiting.  She has had no fever, chills, jaundice, or scleral icterus.    Past Medical History:   Hypertension    Past Surgical History:   Colonoscopy (December 2021, Dr. Alvarez - diverticulosis, internal hemorrhoids)  [place] tooth extraction    Medications:   Mucinex 1200 mg twice daily  Losartan 50 mg daily  Multivitamin once daily    Allergies: No known drug allergies    Family History: Mother with history of hypertension and diverticulitis, father with history of coronary artery disease, sister with history of gallbladder pathology and diverticulitis    Social History: , non-smoker, mild social alcohol use    ROS:   Constitutional: Positive for occasional sweating; negative for fevers or chills  HENT: Positive for runny nose, postnasal drip, sneezing, and sore throat; negative for hearing loss or sinus pressure  Eyes: Negative for vision changes or scleral icterus  Respiratory: Positive for cough; negative for choking or shortness of breath  Cardiovascular: Negative for chest pain or heart palpitations  Gastrointestinal: Negative for abdominal distension, nausea, vomiting, constipation, melena, or hematochezia  Genitourinary: Negative for hematuria or dysuria  Musculoskeletal: Negative for joint swelling or gait instability  Neurologic: Negative for tremors or seizures  Psychiatric:  Negative for suicidal ideations or agitation  All other systems reviewed and negative    Physical Exam:  Height: 160 cm  Weight: 88 kg  BMI: 34.6  General: No acute distress, well-nourished & well-developed  HEAD: normocephalic, atraumatic  EYES: normal conjunctiva, sclera anicteric  EARS: grossly normal hearing  NECK: supple, no thyromegaly  CARDIOVASCULAR: regular rate and rhythm  RESPIRATORY: clear to auscultation bilaterally  GASTROINTESTINAL: soft, nontender, non-distended  MUSCULOSKELETAL: normal gait and station. No gross extremity abnormalities  PSYCHIATRIC: oriented x3, normal mood and affect    IMAGING:  GALLBLADDER ULTRASOUND (8/30/2022):  Cholelithiasis with a solitary 1.9 cm gallstone within the gallbladder. However, I see no gallbladder wall thickening and no pericholecystic fluid and the patient was not tender over the gallbladder during this exam and the remainder of the gallbladder ultrasound is unremarkable.    ASSESSMENT & PLAN  Mrs. Estrada is a 62-year-old lady with incidentally discovered 1.9 cm gallstone on a right upper quadrant ultrasound with no evidence of cholecystitis.  Her gallstone is essentially asymptomatic.  I reviewed her gallbladder ultrasound and have recommended given her lack of symptoms we leave this alone.  I did discuss with her the symptoms suggesting underlying cholecystitis (fever, chills, postprandial right upper quadrant abdominal pain).  She understands should she develop any those symptoms, she should go to the nearest ER or call me directly if its during normal business hours.  She can follow-up with me on an as-needed basis.    Annie Francis MD  General, Robotic, and Endoscopic Surgery  [facility]    [address], [address]  [place] [address]  P: [phone]  F: [phone]

## 2022-11-07 ENCOUNTER — OFFICE VISIT (OUTPATIENT)
Dept: INTERNAL MEDICINE | Facility: CLINIC | Age: 62
End: 2022-11-07

## 2022-11-07 VITALS — TEMPERATURE: 97.9 F | WEIGHT: 196 LBS | HEIGHT: 63 IN | BODY MASS INDEX: 34.73 KG/M2

## 2022-11-07 DIAGNOSIS — R59.0 LYMPHADENOPATHY, SUPRACLAVICULAR: Primary | ICD-10-CM

## 2022-11-07 PROCEDURE — 99212 OFFICE O/P EST SF 10 MIN: CPT | Performed by: PHYSICIAN ASSISTANT

## 2022-11-07 NOTE — PROGRESS NOTES
Subjective   Chief Complaint   Patient presents with   • Mass     Right collarbone x1 week       History of Present Illness     Had covid injection last Tuesday in her right arm and flu shot in her left arm. 1-2 days later noticed a lump just above her collarbone on the right 4 fingertip widths. Had some tenderness initially, has decreased in size over last few days.       Patient Active Problem List   Diagnosis   • Essential hypertension   • Obesity (BMI 30-39.9)       No Known Allergies    Current Outpatient Medications on File Prior to Visit   Medication Sig Dispense Refill   • losartan (COZAAR) 50 MG tablet Take 1 tablet by mouth Daily. 90 tablet 1   • Multiple Vitamins-Minerals (ICAPS AREDS 2 PO) Take  by mouth.     • Multiple Vitamins-Minerals (MULTI-VITAMIN GUMMIES PO) Take  by mouth.     • [DISCONTINUED] guaiFENesin (MUCINEX) 600 MG 12 hr tablet Take 1,200 mg by mouth 2 (Two) Times a Day.       No current facility-administered medications on file prior to visit.       Past Medical History:   Diagnosis Date   • Abnormal Pap smear of cervix     years ago    • Ankle sprain ,    • Hypertension     Losartan 50 mg   • Knee swelling 2021       Family History   Problem Relation Age of Onset   • Hypertension Mother    • Diabetes Mother          ; stroke complications   • Diverticulitis Mother    • Heart disease Father    • Hearing loss Father          2017   • Gallbladder disease Sister    • Diverticulitis Sister    • Breast cancer Neg Hx    • Ovarian cancer Neg Hx    • Uterine cancer Neg Hx    • Colon cancer Neg Hx        Social History     Socioeconomic History   • Marital status:    Tobacco Use   • Smoking status: Never   • Smokeless tobacco: Never   Vaping Use   • Vaping Use: Never used   Substance and Sexual Activity   • Alcohol use: Yes     Alcohol/week: 3.0 standard drinks     Types: 1 Glasses of wine, 1 Cans of beer, 1 Shots of liquor per week     Comment: occ   •  "Drug use: Never   • Sexual activity: Yes     Partners: Male     Birth control/protection: Post-menopausal       Past Surgical History:   Procedure Laterality Date   • COLONOSCOPY N/A 12/30/2021    Procedure: COLONOSCOPY;  Surgeon: Bishnu Alvarez MD;  [place] [facility] [place]  Service: Gastroenterology;  Laterality: N/A;  hemorrhoids, diverticulosis,   • WISDOM TOOTH EXTRACTION  1987    bottom       The following portions of the patient's history were reviewed and updated as appropriate: problem list, allergies, current medications, past medical history, past family history, past social history and past surgical history.    ROS     See HPI    Immunization History   Administered Date(s) Administered   • COVID-19 (PFIZER) PURPLE CAP 03/02/2021, 03/23/2021, 10/07/2021   • Covid-19 (Pfizer) Gray Cap 05/21/2022   • Flu Vaccine Intradermal Quad 18-64YR 10/06/2020   • Fluzone Quad >6mos (Multi-dose) 09/23/2021   • Influenza, Unspecified 09/23/2019   • Shingrix 03/02/2020, 06/22/2020   • Tdap 09/23/2019   • flucelvax quad pfs =>4 YRS 09/23/2019, 10/08/2020       Objective   Vitals:    11/07/22 0856   Temp: 97.9 °F (36.6 °C)   Weight: 88.9 kg (196 lb)   Height: 160 cm (62.99\")     Body mass index is 34.73 kg/m².  Physical Exam  Vitals reviewed.   Constitutional:       Appearance: Normal appearance.   Lymphadenopathy:      Cervical: No cervical adenopathy.      Upper Body:      Right upper body: Supraclavicular adenopathy (1 cm mobile lymph node, non tender) present.   Neurological:      Mental Status: She is alert.           Assessment & Plan   Diagnoses and all orders for this visit:    1. Lymphadenopathy, supraclavicular (Primary)  Comments:  reactive from covid vaccine, should resolve in next 2 weeks. if does not will call        No follow-ups on file.           "

## 2023-01-08 DIAGNOSIS — I10 ESSENTIAL HYPERTENSION: Chronic | ICD-10-CM

## 2023-01-09 RX ORDER — LOSARTAN POTASSIUM 50 MG/1
TABLET ORAL
Qty: 90 TABLET | Refills: 1 | Status: SHIPPED | OUTPATIENT
Start: 2023-01-09

## 2023-01-18 DIAGNOSIS — Z00.00 HEALTHCARE MAINTENANCE: ICD-10-CM

## 2023-01-18 DIAGNOSIS — I10 ESSENTIAL HYPERTENSION: Chronic | ICD-10-CM

## 2023-01-18 LAB
ALBUMIN SERPL-MCNC: 3.9 G/DL (ref 3.5–5.2)
ALBUMIN/GLOB SERPL: 1.3 G/DL
ALP SERPL-CCNC: 140 U/L (ref 39–117)
ALT SERPL-CCNC: 10 U/L (ref 1–33)
AST SERPL-CCNC: 14 U/L (ref 1–32)
BILIRUB SERPL-MCNC: <0.2 MG/DL (ref 0–1.2)
BUN SERPL-MCNC: 13 MG/DL (ref 8–23)
BUN/CREAT SERPL: 21.3 (ref 7–25)
CALCIUM SERPL-MCNC: 8.7 MG/DL (ref 8.6–10.5)
CHLORIDE SERPL-SCNC: 103 MMOL/L (ref 98–107)
CHOLEST SERPL-MCNC: 162 MG/DL (ref 0–200)
CHOLEST/HDLC SERPL: 2.95 {RATIO}
CO2 SERPL-SCNC: 27.1 MMOL/L (ref 22–29)
CREAT SERPL-MCNC: 0.61 MG/DL (ref 0.57–1)
EGFRCR SERPLBLD CKD-EPI 2021: 100.6 ML/MIN/1.73
GLOBULIN SER CALC-MCNC: 3.1 GM/DL
GLUCOSE SERPL-MCNC: 92 MG/DL (ref 65–99)
HDLC SERPL-MCNC: 55 MG/DL (ref 40–60)
LDLC SERPL CALC-MCNC: 91 MG/DL (ref 0–100)
POTASSIUM SERPL-SCNC: 4.5 MMOL/L (ref 3.5–5.2)
PROT SERPL-MCNC: 7 G/DL (ref 6–8.5)
SODIUM SERPL-SCNC: 138 MMOL/L (ref 136–145)
TRIGL SERPL-MCNC: 84 MG/DL (ref 0–150)
VLDLC SERPL CALC-MCNC: 16 MG/DL (ref 5–40)

## 2023-01-22 PROBLEM — K80.20 CALCULUS OF GALLBLADDER WITHOUT CHOLECYSTITIS WITHOUT OBSTRUCTION: Chronic | Status: ACTIVE | Noted: 2023-01-22

## 2023-01-22 NOTE — PROGRESS NOTES
The ABCs of the Annual Wellness Visit  [place] to Medicare Visit    Subjective   {Wrapup  Review (Popup)  Advance Care Planning  Labs  CC  Problem List  Visit Diagnosis  Medications  Result Review  Imaging  [facility]  [facility]  SnapShot  Encounters  Notes  Media  Procedures :23}  Dudley Estrada is a 63 y.o. female who presents for a  Welcome to Medicare Visit.    The following portions of the patient's history were reviewed and   updated as appropriate: allergies, current medications, past family history, past medical history, past social history, past surgical history and problem list.     Compared to one year ago, the patient feels her physical   health is {better worse same:59274}.    Compared to one year ago, the patient feels her mental   health is {better worse same:62494}.    Recent Hospitalizations:  She was not admitted to the hospital during the last year.       Current Medical Providers:  Patient Care Team:  Haydee Sherman APRN as PCP - General (Family Medicine)    Outpatient Medications Prior to Visit   Medication Sig Dispense Refill   • losartan (COZAAR) 50 MG tablet TAKE 1 TABLET BY MOUTH EVERY DAY 90 tablet 1   • Multiple Vitamins-Minerals (ICAPS AREDS 2 PO) Take  by mouth.     • Multiple Vitamins-Minerals (MULTI-VITAMIN GUMMIES PO) Take  by mouth.       No facility-administered medications prior to visit.       No opioid medication identified on active medication list. I have reviewed chart for other potential  high risk medication/s and harmful drug interactions in the elderly.          Aspirin is not on active medication list.  Aspirin use is not indicated based on review of current medical condition/s. Risk of harm outweighs potential benefits.  .    Patient Active Problem List   Diagnosis   • Essential hypertension   • Obesity (BMI 30-39.9)     Advance Care Planning  Advance Directive is not on file.  ACP discussion was held with the  "patient during this visit. Patient does not have an advance directive, information provided.       Objective   There were no vitals filed for this visit.  Estimated body mass index is 34.73 kg/m² as calculated from the following:    Height as of 11/7/22: 160 cm (62.99\").    Weight as of 11/7/22: 88.9 kg (196 lb).    BMI is >= 30 and <35. (Class 1 Obesity). The following options were offered after discussion;: exercise counseling/recommendations and nutrition counseling/recommendations      Does the patient have evidence of cognitive impairment?   No    Lab Results   Component Value Date    CHLPL 162 01/18/2023    TRIG 84 01/18/2023    HDL 55 01/18/2023    LDL 91 01/18/2023    VLDL 16 01/18/2023       Procedures       HEALTH RISK ASSESSMENT    Smoking Status:  Social History     Tobacco Use   Smoking Status Never   Smokeless Tobacco Never     Alcohol Consumption:  Social History     Substance and Sexual Activity   Alcohol Use Yes   • Alcohol/week: 3.0 standard drinks   • Types: 1 Glasses of wine, 1 Cans of beer, 1 Shots of liquor per week    Comment: occ       Fall Risk Screen:    STEADI Fall Risk Assessment has not been completed.    Depression Screen:   No flowsheet data found.    Health Habits and Functional and Cognitive Screening:  No flowsheet data found.    Visual Acuity:    No results found.    Age-appropriate Screening Schedule:  Refer to the list below for future screening recommendations based on patient's age, sex and/or medical conditions. Orders for these recommended tests are listed in the plan section. The patient has been provided with a written plan.    Health Maintenance   Topic Date Due   • PAP SMEAR  09/14/2023   • MAMMOGRAM  06/06/2024   • TDAP/TD VACCINES (2 - Td or Tdap) 09/23/2029   • INFLUENZA VACCINE  Completed   • ZOSTER VACCINE  Completed        CMS Preventative Services Quick Reference  Risk Factors Identified During Encounter    {Medicare Wellness Risk Factors:43773}  The above " risks/problems have been discussed with the patient.  Pertinent information has been shared with the patient in the After Visit Summary.    Follow Up:   Initial Medicare Visit in one year    An After Visit Summary and PPPS were made available to the patient.  {Wrapup  Review (Popup)  Advance Care Planning  Labs  CC  Problem List  Visit Diagnosis  Medications  Result Review  Imaging  Health Maintenance  Quality  BestPractice  SmartSets  SnapShot  Encounters  Notes  Media  Procedures :23}    Additional E&M Note during same encounter follows:  Patient has multiple medical problems which are significant and separately identifiable that require additional work above and beyond the Medicare Wellness Visit.      Chief Complaint  No chief complaint on file.    Subjective    {Problem List  Visit Diagnosis   Encounters  Notes  Medications  Labs  Result Review Imaging  Media :23}    HPI  Dudley Estrada is also being seen today for HTN and obesity.       CMP :)  Alk phosp 140/143/152  Remaining LFTS :)       C162/169   T84/98   H55/49   L91/101    Gallstone       Objective   Vital Signs:  There were no vitals taken for this visit.    Physical Exam     {The following data was reviewed by (Optional):41245}  {Ambulatory Labs (Optional):04755}  {Data reviewed (Optional):00761:::1}        Assessment and Plan {CC Problem List  Visit Diagnosis   ROS  Review (Popup)  [facility]  BestPractice  Medications  SmartSets  SnapShot Encounters  Media :23}  There are no diagnoses linked to this encounter.    Records reviewed include previous OV with myself as well as labs.      {Time Spent (Optional):89782}  Follow Up {Instructions Charge Capture  Follow-up Communications :23}  No follow-ups on file.  Patient was given instructions and counseling regarding her condition or for health maintenance advice. Please see specific information pulled into the AVS if appropriate.

## 2023-01-23 ENCOUNTER — OFFICE VISIT (OUTPATIENT)
Dept: INTERNAL MEDICINE | Facility: CLINIC | Age: 63
End: 2023-01-23
Payer: COMMERCIAL

## 2023-01-23 VITALS — BODY MASS INDEX: 35.05 KG/M2 | HEIGHT: 63 IN | TEMPERATURE: 98.1 F | WEIGHT: 197.8 LBS

## 2023-01-23 DIAGNOSIS — Z12.39 ENCOUNTER FOR OTHER SCREENING FOR MALIGNANT NEOPLASM OF BREAST: ICD-10-CM

## 2023-01-23 DIAGNOSIS — I10 ESSENTIAL HYPERTENSION: Chronic | ICD-10-CM

## 2023-01-23 DIAGNOSIS — E66.9 OBESITY (BMI 30-39.9): Chronic | ICD-10-CM

## 2023-01-23 DIAGNOSIS — Z00.00 ANNUAL PHYSICAL EXAM: Primary | ICD-10-CM

## 2023-01-23 PROCEDURE — 99396 PREV VISIT EST AGE 40-64: CPT | Performed by: NURSE PRACTITIONER

## 2023-01-23 NOTE — PROGRESS NOTES
Subjective   Chief Complaint   Patient presents with   • Medicare Wellness-subsequent       History of Present Illness   63 y.o. female presents for annual physical. She is walking more with a goal of 5000 steps a day in preparation for her trip to [place]. Also eating more mindfully. No weight gain. CLS UTD with Dr. Alvarez with recall in . Eye exam  Every 6 months with Dr. Betancourt. Feeling good without complaints.      Patient Active Problem List   Diagnosis   • Essential hypertension   • Obesity (BMI 30-39.9)   • Calculus of gallbladder without cholecystitis without obstruction       No Known Allergies    Current Outpatient Medications on File Prior to Visit   Medication Sig Dispense Refill   • losartan (COZAAR) 50 MG tablet TAKE 1 TABLET BY MOUTH EVERY DAY 90 tablet 1   • Multiple Vitamins-Minerals (ICAPS AREDS 2 PO) Take  by mouth.     • Multiple Vitamins-Minerals (MULTI-VITAMIN GUMMIES PO) Take  by mouth.       No current facility-administered medications on file prior to visit.       Past Medical History:   Diagnosis Date   • Abnormal Pap smear of cervix     years ago    • Ankle sprain 1991   • Knee swelling 2021       Family History   Problem Relation Age of Onset   • Hypertension Mother    • Diabetes Mother          ; stroke complications   • Diverticulitis Mother    • Heart disease Father    • Hearing loss Father          2017   • Gallbladder disease Sister    • Diverticulitis Sister    • Breast cancer Neg Hx    • Ovarian cancer Neg Hx    • Uterine cancer Neg Hx    • Colon cancer Neg Hx        Social History     Socioeconomic History   • Marital status:    Tobacco Use   • Smoking status: Never   • Smokeless tobacco: Never   Vaping Use   • Vaping Use: Never used   Substance and Sexual Activity   • Alcohol use: Yes     Alcohol/week: 3.0 standard drinks     Types: 1 Glasses of wine, 1 Cans of beer, 1 Shots of liquor per week     Comment: occ   • Drug use: Never   •  "Sexual activity: Yes     Partners: Male     Birth control/protection: Post-menopausal       Past Surgical History:   Procedure Laterality Date   • COLONOSCOPY N/A 12/30/2021    Procedure: COLONOSCOPY;  Surgeon: Bishnu Alvarez MD;  [place] [facility] [place]  Service: Gastroenterology;  Laterality: N/A;  hemorrhoids, diverticulosis,   • WISDOM TOOTH EXTRACTION  1987    bottom       The following portions of the patient's history were reviewed and updated as appropriate: problem list, allergies, current medications, past medical history, past family history, past social history and past surgical history.    Review of Systems   Constitutional: Negative for fatigue.   HENT: Negative for congestion.    Eyes: Negative for visual disturbance.   Respiratory: Negative for shortness of breath.    Cardiovascular: Negative for chest pain.   Gastrointestinal: Negative for blood in stool.   Endocrine: Negative.    Genitourinary: Negative.    Musculoskeletal: Negative for arthralgias.   Skin: Negative for rash.   Allergic/Immunologic: Negative for environmental allergies.   Neurological: Negative for headache.   Hematological: Does not bruise/bleed easily.   Psychiatric/Behavioral: The patient is not nervous/anxious.        Immunization History   Administered Date(s) Administered   • COVID-19 (MODERNA) BIVALENT BOOSTER 12+YRS 11/01/2022   • COVID-19 (PFIZER) PURPLE CAP 03/02/2021, 03/23/2021, 10/07/2021   • Covid-19 (Pfizer) Gray Cap 05/21/2022   • Flu Vaccine Intradermal Quad 18-64YR 10/06/2020   • Fluzone Quad >6mos (Multi-dose) 09/23/2021   • Influenza Injectable Mdck Pf Quad 11/01/2022   • Influenza, Unspecified 09/23/2019   • Shingrix 03/02/2020, 06/22/2020   • Tdap 09/23/2019   • flucelvax quad pfs =>4 YRS 09/23/2019, 10/08/2020       Objective   Vitals:    01/23/23 1417   Temp: 98.1 °F (36.7 °C)   TempSrc: Temporal   Weight: 89.7 kg (197 lb 12.8 oz)   Height: 160 cm (62.99\")     Body mass index is 35.05 kg/m².  Physical " Exam  Vitals reviewed.   Constitutional:       Appearance: Normal appearance. She is well-developed. She is obese.   HENT:      Head: Normocephalic and atraumatic.      Right Ear: Tympanic membrane, ear canal and external ear normal.      Left Ear: Tympanic membrane, ear canal and external ear normal.      Nose: Nose normal.      Mouth/Throat:      Mouth: Mucous membranes are moist.      Pharynx: Oropharynx is clear. No oropharyngeal exudate or posterior oropharyngeal erythema.   Eyes:      General: No scleral icterus.     Extraocular Movements: Extraocular movements intact.      Conjunctiva/sclera: Conjunctivae normal.      Pupils: Pupils are equal, round, and reactive to light.   Neck:      Thyroid: No thyromegaly.      Vascular: No carotid bruit.   Cardiovascular:      Rate and Rhythm: Normal rate and regular rhythm.      Heart sounds: Normal heart sounds. No murmur heard.  Pulmonary:      Effort: Pulmonary effort is normal.      Breath sounds: Normal breath sounds.   Abdominal:      General: Bowel sounds are normal. There is no distension.      Palpations: Abdomen is soft.      Tenderness: There is no abdominal tenderness.   Genitourinary:     Comments: P/P and CBE with GYN.   Musculoskeletal:      Cervical back: Neck supple.      Comments: Ambulates without assistance; no clubbing, cyanosis or edema.    Lymphadenopathy:      Cervical: No cervical adenopathy.   Skin:     General: Skin is warm and dry.   Neurological:      Mental Status: She is alert.   Psychiatric:         Mood and Affect: Mood normal.         Behavior: Behavior normal.         Procedures    Assessment & Plan   Diagnoses and all orders for this visit:    1. Annual physical exam (Primary)  Comments:  Weight loss via dietary changes and 150 minutes weekly aerobic exercise advised. P/P due with Dr. Padilla in September; she will schedule.     2. Essential hypertension  Comments:  Controlled. Continue Losartan 50 mg daily. RTO 6 months.     3. Obesity  (BMI 30-39.9)  Comments:  Weight loss via dietary changes and 150 minutes weekly aerobic activity advised.     4. Encounter for other screening for malignant neoplasm of breast  -     Mammo Screening Digital Tomosynthesis Bilateral With CAD; Future    Records reviewed include previous OV with myself as well as labs.     Return in about 6 months (around 7/23/2023).

## 2023-06-14 ENCOUNTER — HOSPITAL ENCOUNTER (OUTPATIENT)
Dept: MAMMOGRAPHY | Facility: HOSPITAL | Age: 63
Discharge: HOME | End: 2023-06-14
Admitting: NURSE PRACTITIONER
Payer: COMMERCIAL

## 2023-06-14 DIAGNOSIS — Z12.39 ENCOUNTER FOR OTHER SCREENING FOR MALIGNANT NEOPLASM OF BREAST: ICD-10-CM

## 2023-06-14 PROCEDURE — 77063 BREAST TOMOSYNTHESIS BI: CPT

## 2023-06-14 PROCEDURE — 77067 SCR MAMMO BI INCL CAD: CPT

## 2023-07-23 NOTE — PROGRESS NOTES
Subjective   Chief Complaint   Patient presents with    Hypertension       History of Present Illness   63 y.o. female presents for follow up regarding HTN and obesity. Denies chest pain or dyspnea. No dietary changes. Walking 6000 steps daily which is up 2000 stops from last year.      Patient Active Problem List   Diagnosis    Essential hypertension    Obesity (BMI 30-39.9)    Calculus of gallbladder without cholecystitis without obstruction       No Known Allergies    Current Outpatient Medications on File Prior to Visit   Medication Sig Dispense Refill    losartan (COZAAR) 50 MG tablet TAKE 1 TABLET BY MOUTH EVERY DAY 90 tablet 1    Multiple Vitamins-Minerals (ICAPS AREDS 2 PO) Take  by mouth.      Multiple Vitamins-Minerals (MULTI-VITAMIN GUMMIES PO) Take  by mouth.       No current facility-administered medications on file prior to visit.       Past Medical History:   Diagnosis Date    Abnormal Pap smear of cervix     years ago     Ankle sprain 1991    Knee swelling 2021       Family History   Problem Relation Age of Onset    Hypertension Mother     Diabetes Mother          ; stroke complications    Diverticulitis Mother     Stroke Mother     Heart disease Father     Hearing loss Father          2017    Gallbladder disease Sister     Diverticulitis Sister     Breast cancer Neg Hx     Ovarian cancer Neg Hx     Uterine cancer Neg Hx     Colon cancer Neg Hx        Social History     Socioeconomic History    Marital status:    Tobacco Use    Smoking status: Never    Smokeless tobacco: Never   Vaping Use    Vaping Use: Never used   Substance and Sexual Activity    Alcohol use: Yes     Alcohol/week: 3.0 standard drinks     Types: 1 Glasses of wine, 1 Cans of beer, 1 Shots of liquor per week     Comment: occ    Drug use: Never    Sexual activity: Yes     Partners: Male     Birth control/protection: Post-menopausal       Past Surgical History:   Procedure Laterality Date     "COLONOSCOPY N/A 12/30/2021    Procedure: COLONOSCOPY;  Surgeon: Bishnu Alvarez MD;  Location: [facility] [place];  Service: Gastroenterology;  Laterality: N/A;  hemorrhoids, diverticulosis,    WISDOM TOOTH EXTRACTION  1987    bottom       The following portions of the patient's history were reviewed and updated as appropriate: problem list, allergies, current medications, past medical history, and past social history.    Review of Systems    Immunization History   Administered Date(s) Administered    COVID-19 (MODERNA) BIVALENT 12+YRS 11/01/2022    COVID-19 (PFIZER) Purple Cap Monovalent 03/02/2021, 03/23/2021, 10/07/2021    Covid-19 (Pfizer) Gray Cap Monovalent 05/21/2022    Flu Vaccine Intradermal Quad 18-64YR 10/06/2020    Fluzone Quad >6mos (Multi-dose) 09/23/2021    Influenza Injectable Mdck Pf Quad 11/01/2022    Influenza, Unspecified 09/23/2019    Shingrix 03/02/2020, 06/22/2020    Tdap 09/23/2019    flucelvax quad pfs =>4 YRS 09/23/2019, 10/08/2020       Objective   Vitals:    07/24/23 1310   Temp: 98.6 °F (37 °C)   Weight: 88.5 kg (195 lb)   Height: 160 cm (62.99\")     Body mass index is 34.55 kg/m².  Physical Exam  Vitals reviewed.   Constitutional:       Appearance: Normal appearance. She is well-developed.   HENT:      Head: Normocephalic and atraumatic.   Cardiovascular:      Rate and Rhythm: Normal rate and regular rhythm.      Heart sounds: Normal heart sounds, S1 normal and S2 normal.   Pulmonary:      Effort: Pulmonary effort is normal.      Breath sounds: Normal breath sounds.   Skin:     General: Skin is warm and dry.   Neurological:      Mental Status: She is alert.   Psychiatric:         Mood and Affect: Mood normal.         Behavior: Behavior normal.       Procedures    Assessment & Plan   Diagnoses and all orders for this visit:    1. Essential hypertension (Primary)  Comments:  Controlled. Continue current medications.  Orders:  -     Comprehensive Metabolic Panel; Future    2. Obesity (BMI " 30-39.9)  Comments:  Weight loss via dietary changes and 150 minutes aerobic exercise weekly.    3. Healthcare maintenance  -     Lipid Panel; Future    Records reviewed include previous OV with myself as well as labs.     Return in about 6 months (around 1/26/2024) for Lab B4 FUP.

## 2023-07-24 ENCOUNTER — OFFICE VISIT (OUTPATIENT)
Dept: INTERNAL MEDICINE | Facility: CLINIC | Age: 63
End: 2023-07-24
Payer: COMMERCIAL

## 2023-07-24 VITALS — WEIGHT: 195 LBS | TEMPERATURE: 98.6 F | BODY MASS INDEX: 34.55 KG/M2 | HEIGHT: 63 IN

## 2023-07-24 DIAGNOSIS — Z00.00 HEALTHCARE MAINTENANCE: ICD-10-CM

## 2023-07-24 DIAGNOSIS — E66.9 OBESITY (BMI 30-39.9): Chronic | ICD-10-CM

## 2023-07-24 DIAGNOSIS — I10 ESSENTIAL HYPERTENSION: Primary | Chronic | ICD-10-CM

## 2023-07-24 PROCEDURE — 99213 OFFICE O/P EST LOW 20 MIN: CPT | Performed by: NURSE PRACTITIONER

## 2023-12-29 DIAGNOSIS — I10 ESSENTIAL HYPERTENSION: Chronic | ICD-10-CM

## 2023-12-29 RX ORDER — LOSARTAN POTASSIUM 50 MG/1
TABLET ORAL
Qty: 90 TABLET | Refills: 1 | Status: SHIPPED | OUTPATIENT
Start: 2023-12-29

## 2024-01-23 NOTE — PROGRESS NOTES
"Subjective   No chief complaint on file.      History of Present Illness   64 y.o. female presents for annual physical and complaint of dyspnea, a new problem. Dyspnea on exertion started a month ago. Worse with hills. Denies associated chest pain. Her father had heart disease with CABG at age 50. No history of tobacco use.     Notes cough ongoing for 5 days after returning from [place]. Rarely productive but when it is sputum is clear. \"Sounds like a barking seal.\" Denies fever or chills. No sinus drainage or congestion. Cough is worse when she sits down to eat.     She has lost 4# over the last 6 months. Denies melena or hematochezia. Energy is good. Denies N/V/D/C.     P/P with Dr. Padilla in the past. CLS UTD with Dr. Alvarez in  with recall in 10Y.     --195--    CMP :)--Ca 8.4  Alk phos 134/140     Ca    C 127/162   T 79/84   H 45/55   L 66/91     Patient Active Problem List   Diagnosis    Essential hypertension    Obesity (BMI 30-39.9)    Calculus of gallbladder without cholecystitis without obstruction       No Known Allergies    Current Outpatient Medications on File Prior to Visit   Medication Sig Dispense Refill    losartan (COZAAR) 50 MG tablet TAKE 1 TABLET BY MOUTH EVERY DAY 90 tablet 1    Multiple Vitamins-Minerals (ICAPS AREDS 2 PO) Take  by mouth.      Multiple Vitamins-Minerals (MULTI-VITAMIN GUMMIES PO) Take  by mouth.       No current facility-administered medications on file prior to visit.       Past Medical History:   Diagnosis Date    Abnormal Pap smear of cervix     years ago     Ankle sprain ,     Knee swelling 2021       Family History   Problem Relation Age of Onset    Hypertension Mother     Diabetes Mother          ; stroke complications    Diverticulitis Mother     Stroke Mother     Heart disease Father     Hearing loss Father          2017    Gallbladder disease Sister     Diverticulitis Sister     Breast cancer Neg Hx     Ovarian cancer Neg Hx     " "Uterine cancer Neg Hx     Colon cancer Neg Hx        Social History     Socioeconomic History    Marital status:    Tobacco Use    Smoking status: Never    Smokeless tobacco: Never   Vaping Use    Vaping Use: Never used   Substance and Sexual Activity    Alcohol use: Yes     Alcohol/week: 3.0 standard drinks of alcohol     Types: 1 Glasses of wine, 1 Cans of beer, 1 Shots of liquor per week     Comment: occ    Drug use: Never    Sexual activity: Yes     Partners: Male     Birth control/protection: Post-menopausal       Past Surgical History:   Procedure Laterality Date    COLONOSCOPY N/A 12/30/2021    Procedure: COLONOSCOPY;  Surgeon: Bishnu Alvarez MD;  Location: [facility] MAIN OR;  Service: Gastroenterology;  Laterality: N/A;  hemorrhoids, diverticulosis,    WISDOM TOOTH EXTRACTION  1987    bottom       The following portions of the patient's history were reviewed and updated as appropriate: problem list, allergies, current medications, past medical history, past family history, past social history, and past surgical history.    Review of Systems    Immunization History   Administered Date(s) Administered    COVID-19 (MODERNA) BIVALENT 12+YRS 11/01/2022    COVID-19 (PFIZER) Purple Cap Monovalent 03/02/2021, 03/23/2021, 10/07/2021    COVID-19 F23 (NOVAVAX) 12YRS+ 12/18/2023    Covid-19 (Pfizer) Gray Cap Monovalent 05/21/2022    Flu Vaccine Intradermal Quad 18-64YR 10/06/2020    Fluzone Quad >6mos (Multi-dose) 09/23/2021    Influenza Injectable Mdck Pf Quad 11/01/2022    Influenza, Unspecified 09/23/2019    Shingrix 03/02/2020, 06/22/2020    Tdap 09/23/2019    flucelvax quad pfs =>4 YRS 09/23/2019, 10/08/2020       Objective   Vitals:    02/19/24 1102   BP: 122/62   Pulse: 68   Resp: 14   Temp: 98.3 °F (36.8 °C)   Weight: 86.6 kg (191 lb)   Height: 160 cm (62.99\")     Body mass index is 33.84 kg/m².  Physical Exam  Vitals reviewed.   Constitutional:       Appearance: Normal appearance. She is " well-developed.   HENT:      Head: Normocephalic and atraumatic.      Right Ear: Tympanic membrane, ear canal and external ear normal.      Left Ear: Tympanic membrane, ear canal and external ear normal.      Nose: Nose normal.      Mouth/Throat:      Mouth: Mucous membranes are moist.      Pharynx: Oropharynx is clear. No oropharyngeal exudate or posterior oropharyngeal erythema.      Comments: +PND.   Eyes:      General: No scleral icterus.     Extraocular Movements: Extraocular movements intact.      Conjunctiva/sclera: Conjunctivae normal.      Pupils: Pupils are equal, round, and reactive to light.   Neck:      Thyroid: No thyromegaly.      Vascular: No carotid bruit.   Cardiovascular:      Rate and Rhythm: Normal rate and regular rhythm.      Heart sounds: Normal heart sounds. No murmur heard.  Pulmonary:      Effort: Pulmonary effort is normal.      Breath sounds: Normal breath sounds.   Abdominal:      General: Bowel sounds are normal. There is no distension.      Palpations: Abdomen is soft.      Tenderness: There is no abdominal tenderness.   Musculoskeletal:      Cervical back: Neck supple.      Comments: Ambulates without assistance; no clubbing, cyanosis or edema.    Lymphadenopathy:      Cervical: No cervical adenopathy.   Skin:     General: Skin is warm and dry.   Neurological:      Mental Status: She is alert.   Psychiatric:         Mood and Affect: Mood normal.         Behavior: Behavior normal.           ECG 12 Lead    Date/Time: 2/19/2024 12:43 PM  Performed by: Haydee Sherman APRN    Authorized by: Haydee Sherman APRN  Previous ECG: no previous ECG available  Rhythm: sinus rhythm  Rate: normal  BPM: 72  ST Segments: ST segments normal  T Waves: T waves normal  T inversion: I and aVL  QRS axis: normal  Other findings: non-specific ST-T wave changes    Clinical impression: abnormal EKG          Assessment & Plan   Diagnoses and all orders for this visit:    1. Annual physical  exam (Primary)  Comments:  150 minutes weekly aerobic exercise advised. Advised to schedule P/P with Dr. Padilla.    2. Dyspnea on exertion  Comments:  New problem. EKG today with inverted T waves in I and AVL. Stress echo advised. CXR and CBC today. Follow up after reviewing results.  Orders:  -     CBC & Differential  -     XR Chest 2 View  -     Adult Stress Echo W/ Cont or Stress Agent if Necessary Per Protocol  -     ECG 12 Lead    3. Abnormal EKG  Comments:  Inverted T waves leads I and AVL.  Orders:  -     Adult Stress Echo W/ Cont or Stress Agent if Necessary Per Protocol    4. Acute URI  Comments:  Rapid CIVID/ Flu negative. Symptomatic treatment. Will send in Tessalon perles.  Orders:  -     POCT SARS-CoV-2 Antigen SISI + Flu  -     benzonatate (Tessalon Perles) 100 MG capsule; Take 2 capsules by mouth 3 (Three) Times a Day As Needed for Cough.  Dispense: 30 capsule; Refill: 0    5. Essential hypertension  Comments:  Controlled. Continue current medications and RTO 6M.    6. Obesity (BMI 30-39.9)  Comments:  Down 4#. Weight loss via dietary changes and 150 minutes weekly aerobic exercise advised.    7. Serum calcium decreased  -     Calcium, Ionized    8. Encounter for screening mammogram for malignant neoplasm of breast  -     Mammo Screening Digital Tomosynthesis Bilateral With CAD; Future    9. Post-menopausal  -     DEXA Bone Density Axial; Future    Records reviewed include previous OV with myself as well as labs.     Return in about 6 months (around 8/19/2024) for 30  Lab Today.

## 2024-02-19 ENCOUNTER — OFFICE VISIT (OUTPATIENT)
Dept: INTERNAL MEDICINE | Facility: CLINIC | Age: 64
End: 2024-02-19
Payer: COMMERCIAL

## 2024-02-19 ENCOUNTER — HOSPITAL ENCOUNTER (OUTPATIENT)
Facility: HOSPITAL | Age: 64
Discharge: HOME | End: 2024-02-19
Admitting: NURSE PRACTITIONER
Payer: COMMERCIAL

## 2024-02-19 VITALS
SYSTOLIC BLOOD PRESSURE: 122 MMHG | BODY MASS INDEX: 33.84 KG/M2 | TEMPERATURE: 98.3 F | HEART RATE: 68 BPM | HEIGHT: 63 IN | DIASTOLIC BLOOD PRESSURE: 62 MMHG | RESPIRATION RATE: 14 BRPM | WEIGHT: 191 LBS

## 2024-02-19 DIAGNOSIS — I10 ESSENTIAL HYPERTENSION: Chronic | ICD-10-CM

## 2024-02-19 DIAGNOSIS — R06.09 DYSPNEA ON EXERTION: ICD-10-CM

## 2024-02-19 DIAGNOSIS — R94.31 ABNORMAL EKG: ICD-10-CM

## 2024-02-19 DIAGNOSIS — E83.51 SERUM CALCIUM DECREASED: ICD-10-CM

## 2024-02-19 DIAGNOSIS — Z12.31 ENCOUNTER FOR SCREENING MAMMOGRAM FOR MALIGNANT NEOPLASM OF BREAST: ICD-10-CM

## 2024-02-19 DIAGNOSIS — E66.9 OBESITY (BMI 30-39.9): Chronic | ICD-10-CM

## 2024-02-19 DIAGNOSIS — J06.9 ACUTE URI: ICD-10-CM

## 2024-02-19 DIAGNOSIS — Z78.0 POST-MENOPAUSAL: ICD-10-CM

## 2024-02-19 DIAGNOSIS — Z00.00 ANNUAL PHYSICAL EXAM: Primary | ICD-10-CM

## 2024-02-19 LAB
EXPIRATION DATE: NORMAL
FLUAV AG UPPER RESP QL IA.RAPID: NOT DETECTED
FLUBV AG UPPER RESP QL IA.RAPID: NOT DETECTED
INTERNAL CONTROL: NORMAL
Lab: NORMAL
SARS-COV-2 AG UPPER RESP QL IA.RAPID: NOT DETECTED

## 2024-02-19 PROCEDURE — 71046 X-RAY EXAM CHEST 2 VIEWS: CPT

## 2024-02-19 PROCEDURE — 99396 PREV VISIT EST AGE 40-64: CPT | Performed by: NURSE PRACTITIONER

## 2024-02-19 PROCEDURE — 93000 ELECTROCARDIOGRAM COMPLETE: CPT | Performed by: NURSE PRACTITIONER

## 2024-02-19 PROCEDURE — 87428 SARSCOV & INF VIR A&B AG IA: CPT | Performed by: NURSE PRACTITIONER

## 2024-02-19 PROCEDURE — 99214 OFFICE O/P EST MOD 30 MIN: CPT | Performed by: NURSE PRACTITIONER

## 2024-02-19 RX ORDER — BENZONATATE 100 MG/1
200 CAPSULE ORAL 3 TIMES DAILY PRN
Qty: 30 CAPSULE | Refills: 0 | Status: SHIPPED | OUTPATIENT
Start: 2024-02-19

## 2024-02-20 ENCOUNTER — LAB (OUTPATIENT)
Dept: LAB | Facility: HOSPITAL | Age: 64
End: 2024-02-20
Payer: COMMERCIAL

## 2024-02-20 ENCOUNTER — HOSPITAL ENCOUNTER (OUTPATIENT)
Facility: HOSPITAL | Age: 64
Discharge: HOME | End: 2024-02-22
Attending: STUDENT IN AN ORGANIZED HEALTH CARE EDUCATION/TRAINING PROGRAM | Admitting: HOSPITALIST
Payer: COMMERCIAL

## 2024-02-20 ENCOUNTER — APPOINTMENT (OUTPATIENT)
Dept: GENERAL RADIOLOGY | Facility: HOSPITAL | Age: 64
End: 2024-02-20
Payer: COMMERCIAL

## 2024-02-20 DIAGNOSIS — D50.9 MICROCYTIC ANEMIA: Primary | ICD-10-CM

## 2024-02-20 DIAGNOSIS — R06.09 DYSPNEA ON EXERTION: ICD-10-CM

## 2024-02-20 DIAGNOSIS — D64.9 ANEMIA: ICD-10-CM

## 2024-02-20 DIAGNOSIS — D64.9 ANEMIA, UNSPECIFIED TYPE: Primary | ICD-10-CM

## 2024-02-20 DIAGNOSIS — D64.9 ANEMIA, UNSPECIFIED TYPE: ICD-10-CM

## 2024-02-20 PROBLEM — K57.90 DIVERTICULOSIS: Status: ACTIVE | Noted: 2024-02-20

## 2024-02-20 PROBLEM — D50.0 IRON DEFICIENCY ANEMIA DUE TO CHRONIC BLOOD LOSS: Status: ACTIVE | Noted: 2024-02-20

## 2024-02-20 LAB
ABO GROUP BLD: NORMAL
ALBUMIN SERPL-MCNC: 3.8 G/DL (ref 3.5–5.2)
ALBUMIN/GLOB SERPL: 1.4 G/DL
ALP SERPL-CCNC: 143 U/L (ref 39–117)
ALT SERPL W P-5'-P-CCNC: 10 U/L (ref 1–33)
ANION GAP SERPL CALCULATED.3IONS-SCNC: 13.1 MMOL/L (ref 5–15)
ANISOCYTOSIS BLD QL: ABNORMAL
APTT PPP: 25.6 SECONDS (ref 22.7–35.4)
AST SERPL-CCNC: 15 U/L (ref 1–32)
BASOPHILS # BLD AUTO: 0.02 10*3/MM3 (ref 0–0.2)
BASOPHILS # BLD AUTO: 0.1 X10E3/UL (ref 0–0.2)
BASOPHILS # BLD MANUAL: 0.13 10*3/MM3 (ref 0–0.2)
BASOPHILS NFR BLD AUTO: 0.4 % (ref 0–1.5)
BASOPHILS NFR BLD AUTO: 1 %
BASOPHILS NFR BLD MANUAL: 2 % (ref 0–1.5)
BILIRUB SERPL-MCNC: 0.2 MG/DL (ref 0–1.2)
BLD GP AB SCN SERPL QL: NEGATIVE
BUN SERPL-MCNC: 8 MG/DL (ref 8–23)
BUN/CREAT SERPL: 11.6 (ref 7–25)
CA-I BLD-MCNC: 4.7 MG/DL (ref 4.6–5.4)
CA-I SERPL ISE-MCNC: 1.17 MMOL/L (ref 1.15–1.35)
CA-I SERPL ISE-MCNC: NORMAL MG/DL
CALCIUM SPEC-SCNC: 8.2 MG/DL (ref 8.6–10.5)
CHLORIDE SERPL-SCNC: 103 MMOL/L (ref 98–107)
CO2 SERPL-SCNC: 22.9 MMOL/L (ref 22–29)
CREAT SERPL-MCNC: 0.69 MG/DL (ref 0.57–1)
DEPRECATED RDW RBC AUTO: 37.8 FL (ref 37–54)
DEPRECATED RDW RBC AUTO: 40.8 FL (ref 37–54)
EGFRCR SERPLBLD CKD-EPI 2021: 97.1 ML/MIN/1.73
EOSINOPHIL # BLD AUTO: 0.2 X10E3/UL (ref 0–0.4)
EOSINOPHIL # BLD AUTO: 0.22 10*3/MM3 (ref 0–0.4)
EOSINOPHIL # BLD MANUAL: 0.19 10*3/MM3 (ref 0–0.4)
EOSINOPHIL NFR BLD AUTO: 3.9 % (ref 0.3–6.2)
EOSINOPHIL NFR BLD AUTO: 4 %
EOSINOPHIL NFR BLD MANUAL: 3 % (ref 0.3–6.2)
ERYTHROCYTE [DISTWIDTH] IN BLOOD BY AUTOMATED COUNT: 19.6 % (ref 12.3–15.4)
ERYTHROCYTE [DISTWIDTH] IN BLOOD BY AUTOMATED COUNT: 20.2 % (ref 11.7–15.4)
ERYTHROCYTE [DISTWIDTH] IN BLOOD BY AUTOMATED COUNT: 20.8 % (ref 12.3–15.4)
FERRITIN SERPL-MCNC: 3.88 NG/ML (ref 13–150)
GLOBULIN UR ELPH-MCNC: 2.8 GM/DL
GLUCOSE SERPL-MCNC: 163 MG/DL (ref 65–99)
HAPTOGLOB SERPL-MCNC: 169 MG/DL (ref 30–200)
HCT VFR BLD AUTO: 20.4 % (ref 34–46.6)
HCT VFR BLD AUTO: 21.1 % (ref 34–46.6)
HCT VFR BLD AUTO: 21.5 % (ref 34–46.6)
HGB BLD-MCNC: 4.7 G/DL (ref 12–15.9)
HGB BLD-MCNC: 4.9 G/DL (ref 12–15.9)
HGB BLD-MCNC: 5 G/DL (ref 11.1–15.9)
HYPOCHROMIA BLD QL: ABNORMAL
IMM GRANULOCYTES # BLD AUTO: 0 X10E3/UL (ref 0–0.1)
IMM GRANULOCYTES NFR BLD AUTO: 0 %
INR PPP: 1.18 (ref 0.9–1.1)
IRON 24H UR-MRATE: 16 MCG/DL (ref 37–145)
IRON SATN MFR SERPL: 3 % (ref 20–50)
LDH SERPL-CCNC: 141 U/L (ref 135–214)
LYMPHOCYTES # BLD AUTO: 0.99 10*3/MM3 (ref 0.7–3.1)
LYMPHOCYTES # BLD AUTO: 1.3 X10E3/UL (ref 0.7–3.1)
LYMPHOCYTES # BLD MANUAL: 1.28 10*3/MM3 (ref 0.7–3.1)
LYMPHOCYTES NFR BLD AUTO: 17.4 % (ref 19.6–45.3)
LYMPHOCYTES NFR BLD AUTO: 23 %
LYMPHOCYTES NFR BLD MANUAL: 4 % (ref 5–12)
MAGNESIUM SERPL-MCNC: 2.2 MG/DL (ref 1.6–2.4)
MCH RBC QN AUTO: 13.1 PG (ref 26.6–33)
MCH RBC QN AUTO: 13.6 PG (ref 26.6–33)
MCH RBC QN AUTO: 13.6 PG (ref 26.6–33)
MCHC RBC AUTO-ENTMCNC: 23 G/DL (ref 31.5–35.7)
MCHC RBC AUTO-ENTMCNC: 23.2 G/DL (ref 31.5–35.7)
MCHC RBC AUTO-ENTMCNC: 23.3 G/DL (ref 31.5–35.7)
MCV RBC AUTO: 56.8 FL (ref 79–97)
MCV RBC AUTO: 58 FL (ref 79–97)
MCV RBC AUTO: 58.4 FL (ref 79–97)
MICROCYTES BLD QL: ABNORMAL
MONOCYTES # BLD AUTO: 0.43 10*3/MM3 (ref 0.1–0.9)
MONOCYTES # BLD AUTO: 0.7 X10E3/UL (ref 0.1–0.9)
MONOCYTES # BLD: 0.26 10*3/MM3 (ref 0.1–0.9)
MONOCYTES NFR BLD AUTO: 12 %
MONOCYTES NFR BLD AUTO: 7.6 % (ref 5–12)
MORPHOLOGY BLD-IMP: ABNORMAL
NEUTROPHILS # BLD AUTO: 3.3 X10E3/UL (ref 1.4–7)
NEUTROPHILS # BLD AUTO: 4.53 10*3/MM3 (ref 1.7–7)
NEUTROPHILS NFR BLD AUTO: 3.99 10*3/MM3 (ref 1.7–7)
NEUTROPHILS NFR BLD AUTO: 60 %
NEUTROPHILS NFR BLD AUTO: 70 % (ref 42.7–76)
NEUTROPHILS NFR BLD MANUAL: 71 % (ref 42.7–76)
NRBC SPEC MANUAL: 2 /100 WBC (ref 0–0.2)
NT-PROBNP SERPL-MCNC: 159 PG/ML (ref 0–900)
PLAT MORPH BLD: NORMAL
PLATELET # BLD AUTO: 418 10*3/MM3 (ref 140–450)
PLATELET # BLD AUTO: 454 X10E3/UL (ref 150–450)
PLATELET # BLD AUTO: 516 10*3/MM3 (ref 140–450)
PMV BLD AUTO: 8.3 FL (ref 6–12)
PMV BLD AUTO: 9.2 FL (ref 6–12)
POTASSIUM SERPL-SCNC: 3.6 MMOL/L (ref 3.5–5.2)
PROT SERPL-MCNC: 6.6 G/DL (ref 6–8.5)
PROTHROMBIN TIME: 15.1 SECONDS (ref 11.7–14.2)
QT INTERVAL: 403 MS
QTC INTERVAL: 460 MS
RBC # BLD AUTO: 3.59 10*6/MM3 (ref 3.77–5.28)
RBC # BLD AUTO: 3.61 10*6/MM3 (ref 3.77–5.28)
RBC # BLD AUTO: 3.68 X10E6/UL (ref 3.77–5.28)
RETICS # AUTO: 0.05 10*6/MM3 (ref 0.02–0.13)
RETICS/RBC NFR AUTO: 1.28 % (ref 0.7–1.9)
RH BLD: POSITIVE
SODIUM SERPL-SCNC: 139 MMOL/L (ref 136–145)
SPECIMEN STATUS: NORMAL
T&S EXPIRATION DATE: NORMAL
TIBC SERPL-MCNC: 496 MCG/DL (ref 298–536)
TRANSFERRIN SERPL-MCNC: 333 MG/DL (ref 200–360)
TROPONIN T SERPL HS-MCNC: 20 NG/L
VARIANT LYMPHS NFR BLD MANUAL: 20 % (ref 19.6–45.3)
WBC # BLD AUTO: 5.6 X10E3/UL (ref 3.4–10.8)
WBC MORPH BLD: NORMAL
WBC NRBC COR # BLD AUTO: 5.69 10*3/MM3 (ref 3.4–10.8)
WBC NRBC COR # BLD AUTO: 6.38 10*3/MM3 (ref 3.4–10.8)

## 2024-02-20 PROCEDURE — 86923 COMPATIBILITY TEST ELECTRIC: CPT

## 2024-02-20 PROCEDURE — 36415 COLL VENOUS BLD VENIPUNCTURE: CPT

## 2024-02-20 PROCEDURE — 82330 ASSAY OF CALCIUM: CPT | Performed by: NURSE PRACTITIONER

## 2024-02-20 PROCEDURE — 84466 ASSAY OF TRANSFERRIN: CPT | Performed by: STUDENT IN AN ORGANIZED HEALTH CARE EDUCATION/TRAINING PROGRAM

## 2024-02-20 PROCEDURE — 86901 BLOOD TYPING SEROLOGIC RH(D): CPT | Performed by: STUDENT IN AN ORGANIZED HEALTH CARE EDUCATION/TRAINING PROGRAM

## 2024-02-20 PROCEDURE — 85025 COMPLETE CBC W/AUTO DIFF WBC: CPT | Performed by: STUDENT IN AN ORGANIZED HEALTH CARE EDUCATION/TRAINING PROGRAM

## 2024-02-20 PROCEDURE — 25010000002 NA FERRIC GLUC CPLX PER 12.5 MG: Performed by: HOSPITALIST

## 2024-02-20 PROCEDURE — G0378 HOSPITAL OBSERVATION PER HR: HCPCS

## 2024-02-20 PROCEDURE — 83735 ASSAY OF MAGNESIUM: CPT | Performed by: STUDENT IN AN ORGANIZED HEALTH CARE EDUCATION/TRAINING PROGRAM

## 2024-02-20 PROCEDURE — 85045 AUTOMATED RETICULOCYTE COUNT: CPT | Performed by: NURSE PRACTITIONER

## 2024-02-20 PROCEDURE — 99214 OFFICE O/P EST MOD 30 MIN: CPT | Performed by: INTERNAL MEDICINE

## 2024-02-20 PROCEDURE — 93005 ELECTROCARDIOGRAM TRACING: CPT | Performed by: STUDENT IN AN ORGANIZED HEALTH CARE EDUCATION/TRAINING PROGRAM

## 2024-02-20 PROCEDURE — 25810000003 SODIUM CHLORIDE 0.9 % SOLUTION: Performed by: HOSPITALIST

## 2024-02-20 PROCEDURE — 83010 ASSAY OF HAPTOGLOBIN QUANT: CPT | Performed by: STUDENT IN AN ORGANIZED HEALTH CARE EDUCATION/TRAINING PROGRAM

## 2024-02-20 PROCEDURE — P9016 RBC LEUKOCYTES REDUCED: HCPCS

## 2024-02-20 PROCEDURE — 86900 BLOOD TYPING SEROLOGIC ABO: CPT

## 2024-02-20 PROCEDURE — 86850 RBC ANTIBODY SCREEN: CPT | Performed by: STUDENT IN AN ORGANIZED HEALTH CARE EDUCATION/TRAINING PROGRAM

## 2024-02-20 PROCEDURE — 86901 BLOOD TYPING SEROLOGIC RH(D): CPT

## 2024-02-20 PROCEDURE — 71045 X-RAY EXAM CHEST 1 VIEW: CPT

## 2024-02-20 PROCEDURE — 86900 BLOOD TYPING SEROLOGIC ABO: CPT | Performed by: STUDENT IN AN ORGANIZED HEALTH CARE EDUCATION/TRAINING PROGRAM

## 2024-02-20 PROCEDURE — 82728 ASSAY OF FERRITIN: CPT | Performed by: STUDENT IN AN ORGANIZED HEALTH CARE EDUCATION/TRAINING PROGRAM

## 2024-02-20 PROCEDURE — 80053 COMPREHEN METABOLIC PANEL: CPT | Performed by: STUDENT IN AN ORGANIZED HEALTH CARE EDUCATION/TRAINING PROGRAM

## 2024-02-20 PROCEDURE — 83880 ASSAY OF NATRIURETIC PEPTIDE: CPT | Performed by: STUDENT IN AN ORGANIZED HEALTH CARE EDUCATION/TRAINING PROGRAM

## 2024-02-20 PROCEDURE — 85730 THROMBOPLASTIN TIME PARTIAL: CPT | Performed by: STUDENT IN AN ORGANIZED HEALTH CARE EDUCATION/TRAINING PROGRAM

## 2024-02-20 PROCEDURE — 85610 PROTHROMBIN TIME: CPT | Performed by: STUDENT IN AN ORGANIZED HEALTH CARE EDUCATION/TRAINING PROGRAM

## 2024-02-20 PROCEDURE — 84484 ASSAY OF TROPONIN QUANT: CPT | Performed by: STUDENT IN AN ORGANIZED HEALTH CARE EDUCATION/TRAINING PROGRAM

## 2024-02-20 PROCEDURE — 85007 BL SMEAR W/DIFF WBC COUNT: CPT | Performed by: NURSE PRACTITIONER

## 2024-02-20 PROCEDURE — 99285 EMERGENCY DEPT VISIT HI MDM: CPT

## 2024-02-20 PROCEDURE — 83615 LACTATE (LD) (LDH) ENZYME: CPT | Performed by: STUDENT IN AN ORGANIZED HEALTH CARE EDUCATION/TRAINING PROGRAM

## 2024-02-20 PROCEDURE — 93010 ELECTROCARDIOGRAM REPORT: CPT | Performed by: INTERNAL MEDICINE

## 2024-02-20 PROCEDURE — 36430 TRANSFUSION BLD/BLD COMPNT: CPT

## 2024-02-20 PROCEDURE — 83540 ASSAY OF IRON: CPT | Performed by: STUDENT IN AN ORGANIZED HEALTH CARE EDUCATION/TRAINING PROGRAM

## 2024-02-20 PROCEDURE — 36415 COLL VENOUS BLD VENIPUNCTURE: CPT | Performed by: NURSE PRACTITIONER

## 2024-02-20 PROCEDURE — 85025 COMPLETE CBC W/AUTO DIFF WBC: CPT | Performed by: NURSE PRACTITIONER

## 2024-02-20 RX ORDER — AMOXICILLIN 250 MG
2 CAPSULE ORAL 2 TIMES DAILY PRN
Status: DISCONTINUED | OUTPATIENT
Start: 2024-02-20 | End: 2024-02-21

## 2024-02-20 RX ORDER — NITROGLYCERIN 0.4 MG/1
0.4 TABLET SUBLINGUAL
Status: DISCONTINUED | OUTPATIENT
Start: 2024-02-20 | End: 2024-02-22 | Stop reason: HOSPADM

## 2024-02-20 RX ORDER — LOSARTAN POTASSIUM 50 MG/1
50 TABLET ORAL DAILY
Status: DISCONTINUED | OUTPATIENT
Start: 2024-02-20 | End: 2024-02-22 | Stop reason: HOSPADM

## 2024-02-20 RX ORDER — ONDANSETRON 2 MG/ML
4 INJECTION INTRAMUSCULAR; INTRAVENOUS EVERY 6 HOURS PRN
Status: DISCONTINUED | OUTPATIENT
Start: 2024-02-20 | End: 2024-02-22 | Stop reason: HOSPADM

## 2024-02-20 RX ORDER — BISACODYL 10 MG
10 SUPPOSITORY, RECTAL RECTAL DAILY PRN
Status: DISCONTINUED | OUTPATIENT
Start: 2024-02-20 | End: 2024-02-21

## 2024-02-20 RX ORDER — BENZONATATE 100 MG/1
200 CAPSULE ORAL 3 TIMES DAILY PRN
Status: DISCONTINUED | OUTPATIENT
Start: 2024-02-20 | End: 2024-02-22 | Stop reason: HOSPADM

## 2024-02-20 RX ORDER — ACETAMINOPHEN 325 MG/1
650 TABLET ORAL EVERY 4 HOURS PRN
Status: DISCONTINUED | OUTPATIENT
Start: 2024-02-20 | End: 2024-02-22 | Stop reason: HOSPADM

## 2024-02-20 RX ORDER — BISACODYL 5 MG/1
5 TABLET, DELAYED RELEASE ORAL DAILY PRN
Status: DISCONTINUED | OUTPATIENT
Start: 2024-02-20 | End: 2024-02-21

## 2024-02-20 RX ORDER — POLYETHYLENE GLYCOL 3350 17 G/17G
17 POWDER, FOR SOLUTION ORAL DAILY PRN
Status: DISCONTINUED | OUTPATIENT
Start: 2024-02-20 | End: 2024-02-21

## 2024-02-20 RX ADMIN — SODIUM CHLORIDE 250 MG: 9 INJECTION, SOLUTION INTRAVENOUS at 20:33

## 2024-02-20 RX ADMIN — LOSARTAN POTASSIUM 50 MG: 50 TABLET, FILM COATED ORAL at 17:40

## 2024-02-20 RX ADMIN — BENZONATATE 200 MG: 100 CAPSULE ORAL at 20:37

## 2024-02-20 NOTE — PROGRESS NOTES
Clinical Pharmacy Services: Medication History    Dudley Estrada is a 64 y.o. female presenting to [facility] for   Chief Complaint   Patient presents with    Abnormal Lab       She  has a past medical history of Abnormal Pap smear of cervix, Ankle sprain (1985, 1991), and Knee swelling (11/12/2021).    Allergies as of 02/20/2024    (No Known Allergies)       Medication information was obtained from: Pharmacy  Pharmacy and Phone Number:     Prior to Admission Medications       Prescriptions Last Dose Informant Patient Reported? Taking?    benzonatate (Tessalon Perles) 100 MG capsule  Pharmacy No Yes    Take 2 capsules by mouth 3 (Three) Times a Day As Needed for Cough.    losartan (COZAAR) 50 MG tablet  Pharmacy No Yes    TAKE 1 TABLET BY MOUTH EVERY DAY    Patient taking differently:  Take 1 tablet by mouth Daily.    Multiple Vitamins-Minerals (ICAPS AREDS 2 PO)  Self Yes Yes    Take 1 tablet by mouth 2 (Two) Times a Day.    Multiple Vitamins-Minerals (MULTI-VITAMIN GUMMIES PO)  Self Yes Yes    Take 1 tablet by mouth Daily.              Medication notes:     This medication list is complete to the best of my knowledge as of 2/20/2024    Please call if questions.    Hayden Meza  Medication History Technician  [phone]    2/20/2024 11:13 EST

## 2024-02-20 NOTE — ED NOTES
Nursing report ED to floor  Dudley Estrada  64 y.o.  female    HPI :   Chief Complaint   Patient presents with    Abnormal Lab       Admitting doctor:   Royer De La Cruz MD    Admitting diagnosis:   The primary encounter diagnosis was Microcytic anemia. A diagnosis of Dyspnea on exertion was also pertinent to this visit.    Code status:   Current Code Status       Date Active Code Status Order ID Comments User Context       2/20/2024 1109 CPR (Attempt to Resuscitate) [id number]  Naina Araiza APRN ED        Question Answer    Code Status (Patient has no pulse and is not breathing) CPR (Attempt to Resuscitate)    Medical Interventions (Patient has pulse or is breathing) Full Support                    Allergies:   Patient has no known allergies.    Isolation:   No active isolations    Intake and Output  No intake or output data in the 24 hours ending 02/20/24 1141    Weight:       02/20/24  1057   Weight: 86.6 kg (191 lb)       Most recent vitals:   Vitals:    02/20/24 1138 02/20/24 1139 02/20/24 1140 02/20/24 1141   BP: 125/61   123/63   BP Location: Left arm   Left arm   Patient Position: Sitting   Sitting   Pulse: 78 75 73 78   Resp: 18   22   Temp: 97 °F (36.1 °C)   97 °F (36.1 °C)   TempSrc: Tympanic      SpO2: 100% 100% 99% 99%   Weight:       Height:           Active LDAs/IV Access:   Lines, Drains & Airways       Active LDAs       Name Placement date Placement time Site Days    Peripheral IV 02/20/24 1120 Right Antecubital 02/20/24  1120  Antecubital  less than 1                    Labs (abnormal labs have a star):   Labs Reviewed   COMPREHENSIVE METABOLIC PANEL - Abnormal; Notable for the following components:       Result Value    Glucose 163 (*)     Calcium 8.2 (*)     Alkaline Phosphatase 143 (*)     All other components within normal limits    Narrative:     GFR Normal >60  Chronic Kidney Disease <60  Kidney Failure <15     PROTIME-INR - Abnormal; Notable for the following components:    Protime 15.1 (*)      INR 1.18 (*)     All other components within normal limits   SINGLE HSTROPONIN T - Abnormal; Notable for the following components:    HS Troponin T 20 (*)     All other components within normal limits    Narrative:     High Sensitive Troponin T Reference Range:  <14.0 ng/L- Negative Female for AMI  <22.0 ng/L- Negative Male for AMI  >=14 - Abnormal Female indicating possible myocardial injury.  >=22 - Abnormal Male indicating possible myocardial injury.   Clinicians would have to utilize clinical acumen, EKG, Troponin, and serial changes to determine if it is an Acute Myocardial Infarction or myocardial injury due to an underlying chronic condition.        CBC WITH AUTO DIFFERENTIAL - Abnormal; Notable for the following components:    RBC 3.59 (*)     Hemoglobin 4.7 (*)     Hematocrit 20.4 (*)     MCV 56.8 (*)     MCH 13.1 (*)     MCHC 23.0 (*)     RDW 19.6 (*)     Lymphocyte % 17.4 (*)     All other components within normal limits   IRON PROFILE - Abnormal; Notable for the following components:    Iron 16 (*)     Iron Saturation (TSAT) 3 (*)     All other components within normal limits   FERRITIN - Abnormal; Notable for the following components:    Ferritin 3.88 (*)     All other components within normal limits    Narrative:     Results may be falsely decreased if patient taking Biotin.     APTT - Normal   BNP (IN-HOUSE) - Normal    Narrative:     This assay is used as an aid in the diagnosis of individuals suspected of having heart failure. It can be used as an aid in the diagnosis of acute decompensated heart failure (ADHF) in patients presenting with signs and symptoms of ADHF to the emergency department (ED). In addition, NT-proBNP of <300 pg/mL indicates ADHF is not likely.    Age Range Result Interpretation  NT-proBNP Concentration (pg/mL:      <50             Positive            >450                   Gray                 300-450                    Negative             <300    50-75           Positive             >900                  Gray                300-900                  Negative            <300      >75             Positive            >1800                  Gray                300-1800                  Negative            <300   MAGNESIUM - Normal   HAPTOGLOBIN - Normal   LACTATE DEHYDROGENASE - Normal   RETICULOCYTES   HEMOGLOBIN AND HEMATOCRIT, BLOOD   TYPE AND SCREEN   PREPARE RBC   CBC AND DIFFERENTIAL    Narrative:     The following orders were created for panel order CBC & Differential.  Procedure                               Abnormality         Status                     ---------                               -----------         ------                     CBC Auto Differential[[id number]]        Abnormal            Final result                 Please view results for these tests on the individual orders.       EKG:   ECG 12 Lead Dyspnea   Preliminary Result   HEART RATE= 78  bpm   RR Interval= 769  ms   AR Interval= 155  ms   P Horizontal Axis= 1  deg   P Front Axis= 52  deg   QRSD Interval= 89  ms   QT Interval= 403  ms   QTcB= 460  ms   QRS Axis= -19  deg   T Wave Axis= 57  deg   - ABNORMAL ECG -   Sinus rhythm   Probable left atrial enlargement   Borderline left axis deviation   Nonspecific repolarization abnormalities   Electronically Signed By:    Date and Time of Study: 2024-02-20 10:20:27          Meds given in ED:   Medications   ondansetron (ZOFRAN) injection 4 mg (has no administration in time range)   nitroglycerin (NITROSTAT) SL tablet 0.4 mg (has no administration in time range)   sennosides-docusate (PERICOLACE) 8.6-50 MG per tablet 2 tablet (has no administration in time range)     And   polyethylene glycol (MIRALAX) packet 17 g (has no administration in time range)     And   bisacodyl (DULCOLAX) EC tablet 5 mg (has no administration in time range)     And   bisacodyl (DULCOLAX) suppository 10 mg (has no administration in time range)       Imaging results:  XR Chest 1 View    Result  Date: 2/20/2024  No focal consolidation. No pleural effusion or pneumothorax. Cardiac silhouette at the upper limits of normal, favored partly projectional. Moderate size hiatal hernia. No focal osseous abnormality.  This report was finalized on 2/20/2024 10:57 AM by Dr. Carlos Houston M.D on Workstation: [id number]      XR Chest 2 View    Result Date: 2/19/2024  Chronic changes as discussed. No acute cardiopulmonary abnormality is evident on x-ray.  This report was finalized on 2/19/2024 2:57 PM by Dr. Hermelindo Mares M.D on Workstation: [id number]       Ambulatory status:   - up with assistance    Social issues:   Social History     Socioeconomic History    Marital status:    Tobacco Use    Smoking status: Never    Smokeless tobacco: Never   Vaping Use    Vaping Use: Never used   Substance and Sexual Activity    Alcohol use: Yes     Alcohol/week: 3.0 standard drinks of alcohol     Types: 1 Glasses of wine, 1 Cans of beer, 1 Shots of liquor per week     Comment: occ    Drug use: Never    Sexual activity: Yes     Partners: Male     Birth control/protection: Post-menopausal       NIH Stroke Scale:         Satish Au RN  02/20/24 11:41 EST

## 2024-02-20 NOTE — H&P
Patient Name:  Dudley Estrada  YOB: 1960  MRN:  [id number]  Admit Date:  2/20/2024  Patient Care Team:  Haydee Sherman APRN as PCP - General (Family Medicine)      Subjective   History Present Illness     Chief Complaint   Patient presents with    Abnormal Lab       Ms. Estrada is a 64 y.o. with a history of HTN, obesity that presents with dyspnea on exertion and hemoglobin on outside labs of 4.9 found to be 4.7 in the ER with an MCV of 56.  For the past few weeks she has noticed increased fatigue and shortness of breath with exertion but denies dizziness, chest pain, palpitations or lightheadedness.  She denies overt  GI bleeding, vaginal bleeding, epistaxis or other blood loss.  She denies abdominal pain, nausea, vomiting or change in bowels.  Patient had a colonoscopy in 2021 with diverticulosis and hemorrhoids but no other abnormalities.  No known family history of colon cancer.  No prior hemoglobin values available in EMR to compare to current value.     History of Present Illness    Review of Systems   Constitutional:  Positive for activity change and fatigue. Negative for appetite change and unexpected weight change.   HENT:  Negative for nosebleeds and trouble swallowing.    Respiratory:  Positive for shortness of breath. Negative for choking.    Cardiovascular:  Negative for chest pain.   Gastrointestinal:  Negative for abdominal distention, abdominal pain, anal bleeding, blood in stool, constipation, diarrhea, nausea, rectal pain and vomiting.   Genitourinary:  Negative for dysuria, menstrual problem and vaginal bleeding.   Musculoskeletal:  Negative for back pain.   Skin:  Negative for color change.   Neurological:  Negative for dizziness.   Hematological:  Does not bruise/bleed easily.   Psychiatric/Behavioral: Negative.          Personal History     Past Medical History:   Diagnosis Date    Abnormal Pap smear of cervix     years ago     Ankle sprain 1985, 1991    Knee swelling  2021     Past Surgical History:   Procedure Laterality Date    COLONOSCOPY N/A 2021    Procedure: COLONOSCOPY;  Surgeon: Bishnu Alvarez MD;  Location: [facility] MAIN OR;  Service: Gastroenterology;  Laterality: N/A;  hemorrhoids, diverticulosis,    WISDOM TOOTH EXTRACTION      bottom     Family History   Problem Relation Age of Onset    Hypertension Mother     Diabetes Mother          ; stroke complications    Diverticulitis Mother     Stroke Mother     Heart disease Father     Hearing loss Father          2017    Gallbladder disease Sister     Diverticulitis Sister     Breast cancer Neg Hx     Ovarian cancer Neg Hx     Uterine cancer Neg Hx     Colon cancer Neg Hx      Social History     Tobacco Use    Smoking status: Never    Smokeless tobacco: Never   Vaping Use    Vaping Use: Never used   Substance Use Topics    Alcohol use: Yes     Alcohol/week: 3.0 standard drinks of alcohol     Types: 1 Glasses of wine, 1 Cans of beer, 1 Shots of liquor per week     Comment: occ    Drug use: Never     No current facility-administered medications on file prior to encounter.     Current Outpatient Medications on File Prior to Encounter   Medication Sig Dispense Refill    benzonatate (Tessalon Perles) 100 MG capsule Take 2 capsules by mouth 3 (Three) Times a Day As Needed for Cough. 30 capsule 0    losartan (COZAAR) 50 MG tablet TAKE 1 TABLET BY MOUTH EVERY DAY (Patient taking differently: Take 1 tablet by mouth Daily.) 90 tablet 1    Multiple Vitamins-Minerals (ICAPS AREDS 2 PO) Take 1 tablet by mouth 2 (Two) Times a Day.      Multiple Vitamins-Minerals (MULTI-VITAMIN GUMMIES PO) Take 1 tablet by mouth Daily.       No Known Allergies    Objective    Objective     Vital Signs  Temp:  [96.9 °F (36.1 °C)-98.7 °F (37.1 °C)] 97.8 °F (36.6 °C)  Heart Rate:  [] 72  Resp:  [16-22] 16  BP: (123-144)/(61-70) 144/68  SpO2:  [96 %-100 %] 97 %  on   ;   Device (Oxygen Therapy): room air  Body mass  index is 34.01 kg/m².    Physical Exam  Vitals and nursing note reviewed.   Constitutional:       Appearance: She is well-developed. She is obese.   HENT:      Head: Normocephalic and atraumatic.   Eyes:      Pupils: Pupils are equal, round, and reactive to light.   Cardiovascular:      Rate and Rhythm: Normal rate and regular rhythm.      Heart sounds: Normal heart sounds.   Pulmonary:      Effort: Pulmonary effort is normal.      Breath sounds: Normal breath sounds.   Abdominal:      General: Bowel sounds are normal. There is no distension or abdominal bruit.      Palpations: Abdomen is soft. Abdomen is not rigid. There is no shifting dullness, fluid wave, mass or pulsatile mass.      Tenderness: There is no abdominal tenderness. There is no guarding.      Hernia: No hernia is present.   Musculoskeletal:         General: Normal range of motion.   Skin:     General: Skin is warm and dry.      Coloration: Skin is pale.   Neurological:      Mental Status: She is alert and oriented to person, place, and time.   Psychiatric:         Behavior: Behavior normal.         Thought Content: Thought content normal.         Results Review:  I reviewed the patient's new clinical results.  I reviewed the patient's new imaging results and agree with the interpretation.  I reviewed the patient's other test results and agree with the interpretation  I personally viewed and interpreted the patient's EKG/Telemetry data  Discussed with ED provider.    Lab Results (last 24 hours)       Procedure Component Value Units Date/Time    Calcium, Ionized [[id number]]  (Normal) Collected: 02/20/24 0830    Specimen: Blood Updated: 02/20/24 0931     Ionized Calcium 1.17 mmol/L      Ionized Calcium 4.7 mg/dL     CBC & Differential [[id number]]  (Abnormal) Collected: 02/20/24 0830    Specimen: Blood Updated: 02/20/24 0942    Narrative:      The following orders were created for panel order CBC & Differential.  Procedure                                Abnormality         Status                     ---------                               -----------         ------                     CBC Auto Differential[[id number]]        Abnormal            Final result                 Please view results for these tests on the individual orders.    CBC Auto Differential [[id number]]  (Abnormal) Collected: 02/20/24 0830    Specimen: Blood Updated: 02/20/24 0942     WBC 6.38 10*3/mm3      RBC 3.61 10*6/mm3      Hemoglobin 4.9 g/dL      Hematocrit 21.1 %      MCV 58.4 fL      MCH 13.6 pg      MCHC 23.2 g/dL      RDW 20.8 %      RDW-SD 40.8 fl      MPV 9.2 fL      Platelets 516 10*3/mm3     Manual Differential [[id number]]  (Abnormal) Collected: 02/20/24 0830    Specimen: Blood Updated: 02/20/24 0942     Neutrophil % 71.0 %      Lymphocyte % 20.0 %      Monocyte % 4.0 %      Eosinophil % 3.0 %      Basophil % 2.0 %      Neutrophils Absolute 4.53 10*3/mm3      Lymphocytes Absolute 1.28 10*3/mm3      Monocytes Absolute 0.26 10*3/mm3      Eosinophils Absolute 0.19 10*3/mm3      Basophils Absolute 0.13 10*3/mm3      nRBC 2.0 /100 WBC      Anisocytosis Mod/2+     Hypochromia Mod/2+     Microcytes Large/3+     WBC Morphology Normal     Platelet Morphology Normal    CBC & Differential [[id number]]  (Abnormal) Collected: 02/20/24 1011    Specimen: Blood Updated: 02/20/24 1037    Narrative:      The following orders were created for panel order CBC & Differential.  Procedure                               Abnormality         Status                     ---------                               -----------         ------                     CBC Auto Differential[[id number]]        Abnormal            Final result                 Please view results for these tests on the individual orders.    Comprehensive Metabolic Panel [[id number]]  (Abnormal) Collected: 02/20/24 1011    Specimen: Blood Updated: 02/20/24 1043     Glucose 163 mg/dL      BUN 8 mg/dL      Creatinine 0.69 mg/dL      Sodium 139 mmol/L       Potassium 3.6 mmol/L      Chloride 103 mmol/L      CO2 22.9 mmol/L      Calcium 8.2 mg/dL      Total Protein 6.6 g/dL      Albumin 3.8 g/dL      ALT (SGPT) 10 U/L      AST (SGOT) 15 U/L      Alkaline Phosphatase 143 U/L      Total Bilirubin 0.2 mg/dL      Globulin 2.8 gm/dL      A/G Ratio 1.4 g/dL      BUN/Creatinine Ratio 11.6     Anion Gap 13.1 mmol/L      eGFR 97.1 mL/min/1.73     Narrative:      GFR Normal >60  Chronic Kidney Disease <60  Kidney Failure <15      Protime-INR [[id number]]  (Abnormal) Collected: 02/20/24 1011    Specimen: Blood Updated: 02/20/24 1032     Protime 15.1 Seconds      INR 1.18    aPTT [[id number]]  (Normal) Collected: 02/20/24 1011    Specimen: Blood Updated: 02/20/24 1032     PTT 25.6 seconds     BNP [[id number]]  (Normal) Collected: 02/20/24 1011    Specimen: Blood Updated: 02/20/24 1041     proBNP 159.0 pg/mL     Narrative:      This assay is used as an aid in the diagnosis of individuals suspected of having heart failure. It can be used as an aid in the diagnosis of acute decompensated heart failure (ADHF) in patients presenting with signs and symptoms of ADHF to the emergency department (ED). In addition, NT-proBNP of <300 pg/mL indicates ADHF is not likely.    Age Range Result Interpretation  NT-proBNP Concentration (pg/mL:      <50             Positive            >450                   Gray                 300-450                    Negative             <300    50-75           Positive            >900                  Gray                300-900                  Negative            <300      >75             Positive            >1800                  Gray                300-1800                  Negative            <300    Single High Sensitivity Troponin T [[id number]]  (Abnormal) Collected: 02/20/24 1011    Specimen: Blood Updated: 02/20/24 1043     HS Troponin T 20 ng/L     Narrative:      High Sensitive Troponin T Reference Range:  <14.0 ng/L- Negative Female for AMI  <22.0  ng/L- Negative Male for AMI  >=14 - Abnormal Female indicating possible myocardial injury.  >=22 - Abnormal Male indicating possible myocardial injury.   Clinicians would have to utilize clinical acumen, EKG, Troponin, and serial changes to determine if it is an Acute Myocardial Infarction or myocardial injury due to an underlying chronic condition.         Magnesium [[id number]]  (Normal) Collected: 02/20/24 1011    Specimen: Blood Updated: 02/20/24 1043     Magnesium 2.2 mg/dL     CBC Auto Differential [[id number]]  (Abnormal) Collected: 02/20/24 1011    Specimen: Blood Updated: 02/20/24 1037     WBC 5.69 10*3/mm3      RBC 3.59 10*6/mm3      Hemoglobin 4.7 g/dL      Hematocrit 20.4 %      MCV 56.8 fL      MCH 13.1 pg      MCHC 23.0 g/dL      RDW 19.6 %      RDW-SD 37.8 fl      MPV 8.3 fL      Platelets 418 10*3/mm3      Neutrophil % 70.0 %      Lymphocyte % 17.4 %      Monocyte % 7.6 %      Eosinophil % 3.9 %      Basophil % 0.4 %      Neutrophils, Absolute 3.99 10*3/mm3      Lymphocytes, Absolute 0.99 10*3/mm3      Monocytes, Absolute 0.43 10*3/mm3      Eosinophils, Absolute 0.22 10*3/mm3      Basophils, Absolute 0.02 10*3/mm3     Iron Profile [[id number]]  (Abnormal) Collected: 02/20/24 1011    Specimen: Blood Updated: 02/20/24 1119     Iron 16 mcg/dL      Iron Saturation (TSAT) 3 %      Transferrin 333 mg/dL      TIBC 496 mcg/dL     Ferritin [[id number]]  (Abnormal) Collected: 02/20/24 1011    Specimen: Blood Updated: 02/20/24 1125     Ferritin 3.88 ng/mL     Narrative:      Results may be falsely decreased if patient taking Biotin.      Haptoglobin [[id number]]  (Normal) Collected: 02/20/24 1011    Specimen: Blood Updated: 02/20/24 1119     Haptoglobin 169 mg/dL     Lactate Dehydrogenase [[id number]]  (Normal) Collected: 02/20/24 1011    Specimen: Blood Updated: 02/20/24 1119      U/L     Reticulocytes [[id number]]  (Normal) Collected: 02/20/24 1011    Specimen: Blood Updated: 02/20/24 1148     Reticulocyte %  1.28 %      Reticulocyte Absolute 0.0456 10*6/mm3             Imaging Results (Last 24 Hours)       Procedure Component Value Units Date/Time    XR Chest 1 View [[id number]] Collected: 02/20/24 1054     Updated: 02/20/24 1101    Narrative:      XR CHEST 1 VW-     INDICATION: Dyspnea and cough     COMPARISON: Chest radiograph 2/19/2024       Impression:      No focal consolidation. No pleural effusion or pneumothorax.  Cardiac silhouette at the upper limits of normal, favored partly  projectional. Moderate size hiatal hernia. No focal osseous abnormality.     This report was finalized on 2/20/2024 10:57 AM by Dr. Carlos Houston M.D on Workstation: [id number]                   ECG 12 Lead Dyspnea   Preliminary Result   HEART RATE= 78  bpm   RR Interval= 769  ms   MO Interval= 155  ms   P Horizontal Axis= 1  deg   P Front Axis= 52  deg   QRSD Interval= 89  ms   QT Interval= 403  ms   QTcB= 460  ms   QRS Axis= -19  deg   T Wave Axis= 57  deg   - ABNORMAL ECG -   Sinus rhythm   Probable left atrial enlargement   Borderline left axis deviation   Nonspecific repolarization abnormalities   Electronically Signed By:    Date and Time of Study: 2024-02-20 10:20:27           Assessment/Plan     Active Hospital Problems    Diagnosis  POA    **Symptomatic anemia [D64.9]  Yes    Dyspnea on exertion [R06.09]  Yes    Anemia [D64.9]  Yes    Obesity (BMI 30-39.9) [E66.9]  Yes    Essential hypertension [I10]  Yes      Resolved Hospital Problems   No resolved problems to display.       Ms. Estrada is a 64 y.o. admitted with a hemoglobin of 4.7 without overt bleeding, vital signs stable but symptomatic with dyspnea on exertion and fatigue.     3 units packed red blood cells ordered.  Trend hemoglobin with serial H&H.  Consult hematology for evaluation. Anemia studies ordered.    She did have normal screening colonoscopy in 2021.  Consult GI for further evaluation of severe CAM      I discussed the patient's findings and my recommendations  with patient, family, and nursing staff.    VTE Prophylaxis - SCDs.  Code Status - Full code.       TANA Lozano  [facility]  02/20/24  14:22 EST

## 2024-02-20 NOTE — ED NOTES
Patient to ER via car from home went to PCP yesterday for physical told that her hem was 5.0 PCP did repeat this morning it was 4.9     Patient reports SOA x 1 month

## 2024-02-20 NOTE — Clinical Note
Level of Care: Med/Surg [1]   Diagnosis: Dyspnea on exertion [[id number]]   Admitting Physician: MAURICE CASTANEDA [[id number]]   Attending Physician: MAURICE CASTANEDA [[id number]]   Certification: I Certify That Inpatient Hospital Services Are Medically Necessary For Greater Than 2 Midnights

## 2024-02-20 NOTE — ED PROVIDER NOTES
EMERGENCY DEPARTMENT ENCOUNTER    Room Number:    PCP: Haydee Sherman APRN  History obtained from: Patient      HPI:  Chief Complaint: Dyspnea  A complete HPI/ROS/PMH/PSH/SH/FH are unobtainable due to: N/A  Context: Dudley Estrada is a 64 y.o. female who presents to the ED c/o dyspnea on exertion.  Ongoing for last month, worsened over the last week.  No melena or hematochezia.  Had outpatient labs which showed low hemoglobin.  Patient does not have a restrictive diet, has never been iron deficient before.  Denies any other sources of bleeding.  No other recent illness, fever, chills.            PAST MEDICAL HISTORY  Active Ambulatory Problems     Diagnosis Date Noted    Essential hypertension 2019    Obesity (BMI 30-39.9) 2021    Calculus of gallbladder without cholecystitis without obstruction 2023     Resolved Ambulatory Problems     Diagnosis Date Noted    No Resolved Ambulatory Problems     Past Medical History:   Diagnosis Date    Abnormal Pap smear of cervix     Ankle sprain 1991    Knee swelling 2021         PAST SURGICAL HISTORY  Past Surgical History:   Procedure Laterality Date    COLONOSCOPY N/A 2021    Procedure: COLONOSCOPY;  Surgeon: Bishnu Alvarez MD;  Location: [facility] MAIN OR;  Service: Gastroenterology;  Laterality: N/A;  hemorrhoids, diverticulosis,    WISDOM TOOTH EXTRACTION      bottom         FAMILY HISTORY  Family History   Problem Relation Age of Onset    Hypertension Mother     Diabetes Mother          ; stroke complications    Diverticulitis Mother     Stroke Mother     Heart disease Father     Hearing loss Father          2017    Gallbladder disease Sister     Diverticulitis Sister     Breast cancer Neg Hx     Ovarian cancer Neg Hx     Uterine cancer Neg Hx     Colon cancer Neg Hx          SOCIAL HISTORY  Social History     Socioeconomic History    Marital status:    Tobacco Use    Smoking status: Never     Smokeless tobacco: Never   Vaping Use    Vaping Use: Never used   Substance and Sexual Activity    Alcohol use: Yes     Alcohol/week: 3.0 standard drinks of alcohol     Types: 1 Glasses of wine, 1 Cans of beer, 1 Shots of liquor per week     Comment: occ    Drug use: Never    Sexual activity: Yes     Partners: Male     Birth control/protection: Post-menopausal         ALLERGIES  Patient has no known allergies.        REVIEW OF SYSTEMS    As per HPI      PHYSICAL EXAM  ED Triage Vitals   Temp Heart Rate Resp BP SpO2   02/20/24 0945 02/20/24 0945 02/20/24 0945 02/20/24 0946 02/20/24 0945   96.9 °F (36.1 °C) 118 20 143/63 100 %      Temp src Heart Rate Source Patient Position BP Location FiO2 (%)   02/20/24 1117 02/20/24 1138 02/20/24 0946 02/20/24 1138 --   Tympanic Monitor Sitting Left arm        Physical Exam  Constitutional:       General: She is not in acute distress.  HENT:      Head: Normocephalic and atraumatic.   Cardiovascular:      Rate and Rhythm: Normal rate and regular rhythm.   Pulmonary:      Effort: Pulmonary effort is normal. No respiratory distress.   Abdominal:      General: There is no distension.      Palpations: Abdomen is soft.      Tenderness: There is no abdominal tenderness.   Musculoskeletal:         General: No swelling or deformity.   Skin:     General: Skin is warm and dry.      Coloration: Skin is pale.   Neurological:      Mental Status: She is alert. Mental status is at baseline.           Vital signs and nursing notes reviewed.          LAB RESULTS  Recent Results (from the past 24 hour(s))   Calcium, Ionized    Collection Time: 02/20/24  8:30 AM    Specimen: Blood   Result Value Ref Range    Ionized Calcium 1.17 1.15 - 1.35 mmol/L    Ionized Calcium 4.7 4.6 - 5.4 mg/dL   CBC Auto Differential    Collection Time: 02/20/24  8:30 AM    Specimen: Blood   Result Value Ref Range    WBC 6.38 3.40 - 10.80 10*3/mm3    RBC 3.61 (L) 3.77 - 5.28 10*6/mm3    Hemoglobin 4.9 (C) 12.0 - 15.9 g/dL     Hematocrit 21.1 (L) 34.0 - 46.6 %    MCV 58.4 (L) 79.0 - 97.0 fL    MCH 13.6 (L) 26.6 - 33.0 pg    MCHC 23.2 (L) 31.5 - 35.7 g/dL    RDW 20.8 (H) 12.3 - 15.4 %    RDW-SD 40.8 37.0 - 54.0 fl    MPV 9.2 6.0 - 12.0 fL    Platelets 516 (H) 140 - 450 10*3/mm3   Manual Differential    Collection Time: 02/20/24  8:30 AM    Specimen: Blood   Result Value Ref Range    Neutrophil % 71.0 42.7 - 76.0 %    Lymphocyte % 20.0 19.6 - 45.3 %    Monocyte % 4.0 (L) 5.0 - 12.0 %    Eosinophil % 3.0 0.3 - 6.2 %    Basophil % 2.0 (H) 0.0 - 1.5 %    Neutrophils Absolute 4.53 1.70 - 7.00 10*3/mm3    Lymphocytes Absolute 1.28 0.70 - 3.10 10*3/mm3    Monocytes Absolute 0.26 0.10 - 0.90 10*3/mm3    Eosinophils Absolute 0.19 0.00 - 0.40 10*3/mm3    Basophils Absolute 0.13 0.00 - 0.20 10*3/mm3    nRBC 2.0 (H) 0.0 - 0.2 /100 WBC    Anisocytosis Mod/2+ None Seen    Hypochromia Mod/2+ None Seen    Microcytes Large/3+ None Seen    WBC Morphology Normal Normal    Platelet Morphology Normal Normal   Comprehensive Metabolic Panel    Collection Time: 02/20/24 10:11 AM    Specimen: Blood   Result Value Ref Range    Glucose 163 (H) 65 - 99 mg/dL    BUN 8 8 - 23 mg/dL    Creatinine 0.69 0.57 - 1.00 mg/dL    Sodium 139 136 - 145 mmol/L    Potassium 3.6 3.5 - 5.2 mmol/L    Chloride 103 98 - 107 mmol/L    CO2 22.9 22.0 - 29.0 mmol/L    Calcium 8.2 (L) 8.6 - 10.5 mg/dL    Total Protein 6.6 6.0 - 8.5 g/dL    Albumin 3.8 3.5 - 5.2 g/dL    ALT (SGPT) 10 1 - 33 U/L    AST (SGOT) 15 1 - 32 U/L    Alkaline Phosphatase 143 (H) 39 - 117 U/L    Total Bilirubin 0.2 0.0 - 1.2 mg/dL    Globulin 2.8 gm/dL    A/G Ratio 1.4 g/dL    BUN/Creatinine Ratio 11.6 7.0 - 25.0    Anion Gap 13.1 5.0 - 15.0 mmol/L    eGFR 97.1 >60.0 mL/min/1.73   Protime-INR    Collection Time: 02/20/24 10:11 AM    Specimen: Blood   Result Value Ref Range    Protime 15.1 (H) 11.7 - 14.2 Seconds    INR 1.18 (H) 0.90 - 1.10   aPTT    Collection Time: 02/20/24 10:11 AM    Specimen: Blood   Result Value  Ref Range    PTT 25.6 22.7 - 35.4 seconds   BNP    Collection Time: 02/20/24 10:11 AM    Specimen: Blood   Result Value Ref Range    proBNP 159.0 0.0 - 900.0 pg/mL   Single High Sensitivity Troponin T    Collection Time: 02/20/24 10:11 AM    Specimen: Blood   Result Value Ref Range    HS Troponin T 20 (H) <14 ng/L   Type & Screen    Collection Time: 02/20/24 10:11 AM    Specimen: Blood   Result Value Ref Range    ABO Type O     RH type Positive     Antibody Screen Negative     T&S Expiration Date 2/23/2024 11:59:59 PM    Magnesium    Collection Time: 02/20/24 10:11 AM    Specimen: Blood   Result Value Ref Range    Magnesium 2.2 1.6 - 2.4 mg/dL   CBC Auto Differential    Collection Time: 02/20/24 10:11 AM    Specimen: Blood   Result Value Ref Range    WBC 5.69 3.40 - 10.80 10*3/mm3    RBC 3.59 (L) 3.77 - 5.28 10*6/mm3    Hemoglobin 4.7 (C) 12.0 - 15.9 g/dL    Hematocrit 20.4 (C) 34.0 - 46.6 %    MCV 56.8 (L) 79.0 - 97.0 fL    MCH 13.1 (L) 26.6 - 33.0 pg    MCHC 23.0 (L) 31.5 - 35.7 g/dL    RDW 19.6 (H) 12.3 - 15.4 %    RDW-SD 37.8 37.0 - 54.0 fl    MPV 8.3 6.0 - 12.0 fL    Platelets 418 140 - 450 10*3/mm3    Neutrophil % 70.0 42.7 - 76.0 %    Lymphocyte % 17.4 (L) 19.6 - 45.3 %    Monocyte % 7.6 5.0 - 12.0 %    Eosinophil % 3.9 0.3 - 6.2 %    Basophil % 0.4 0.0 - 1.5 %    Neutrophils, Absolute 3.99 1.70 - 7.00 10*3/mm3    Lymphocytes, Absolute 0.99 0.70 - 3.10 10*3/mm3    Monocytes, Absolute 0.43 0.10 - 0.90 10*3/mm3    Eosinophils, Absolute 0.22 0.00 - 0.40 10*3/mm3    Basophils, Absolute 0.02 0.00 - 0.20 10*3/mm3   Iron Profile    Collection Time: 02/20/24 10:11 AM    Specimen: Blood   Result Value Ref Range    Iron 16 (L) 37 - 145 mcg/dL    Iron Saturation (TSAT) 3 (L) 20 - 50 %    Transferrin 333 200 - 360 mg/dL    TIBC 496 298 - 536 mcg/dL   Ferritin    Collection Time: 02/20/24 10:11 AM    Specimen: Blood   Result Value Ref Range    Ferritin 3.88 (L) 13.00 - 150.00 ng/mL   Haptoglobin    Collection Time:  02/20/24 10:11 AM    Specimen: Blood   Result Value Ref Range    Haptoglobin 169 30 - 200 mg/dL   Lactate Dehydrogenase    Collection Time: 02/20/24 10:11 AM    Specimen: Blood   Result Value Ref Range     135 - 214 U/L   Reticulocytes    Collection Time: 02/20/24 10:11 AM    Specimen: Blood   Result Value Ref Range    Reticulocyte % 1.28 0.70 - 1.90 %    Reticulocyte Absolute 0.0456 0.0200 - 0.1300 10*6/mm3   ECG 12 Lead Dyspnea    Collection Time: 02/20/24 10:20 AM   Result Value Ref Range    QT Interval 403 ms    QTC Interval 460 ms   Prepare RBC, 3 Units    Collection Time: 02/20/24 11:25 AM   Result Value Ref Range    Product Code P6165N00     Unit Number [id number]     UNIT  ABO O     UNIT  RH POS     Crossmatch Interpretation Compatible     Dispense Status IS     Blood Expiration Date 202403082359     Blood Type Barcode 5100     Product Code S9455G89     Unit Number [id number]     UNIT  ABO O     UNIT  RH POS     Crossmatch Interpretation Compatible     Dispense Status XM     Blood Expiration Date 202403082359     Blood Type Barcode 5100     Product Code L2905A00     Unit Number [id number]     UNIT  ABO O     UNIT  RH POS     Crossmatch Interpretation Compatible     Dispense Status XM     Blood Expiration Date 202403082359     Blood Type Barcode 5100        Ordered the above labs and reviewed the results.        RADIOLOGY  XR Chest 1 View    Result Date: 2/20/2024  XR CHEST 1 VW-  INDICATION: Dyspnea and cough  COMPARISON: Chest radiograph 2/19/2024      No focal consolidation. No pleural effusion or pneumothorax. Cardiac silhouette at the upper limits of normal, favored partly projectional. Moderate size hiatal hernia. No focal osseous abnormality.  This report was finalized on 2/20/2024 10:57 AM by Dr. Carlos Houston M.D on Workstation: [id number]      XR Chest 2 View    Result Date: 2/19/2024  PA AND LATERAL CHEST X-RAY  HISTORY: Dyspnea on exertion with cough for 5 days.  Chest x-ray  consisting of 2 images is provided. Correlation: None.  FINDINGS: The cardiomediastinal silhouette is normal except for a small hiatal hernia. The lungs are clear. The costophrenic sulci are dry and the bones appear normal. There is no pneumothorax. Osteoarthritic change is observed at the right shoulder.      Chronic changes as discussed. No acute cardiopulmonary abnormality is evident on x-ray.  This report was finalized on 2/19/2024 2:57 PM by Dr. Hermelindo Mares M.D on Workstation: [id number]       Ordered the above noted radiological studies. Reviewed by me in PACS.                MEDICATIONS GIVEN IN ER  Medications   ondansetron (ZOFRAN) injection 4 mg (has no administration in time range)   nitroglycerin (NITROSTAT) SL tablet 0.4 mg (has no administration in time range)   sennosides-docusate (PERICOLACE) 8.6-50 MG per tablet 2 tablet (has no administration in time range)     And   polyethylene glycol (MIRALAX) packet 17 g (has no administration in time range)     And   bisacodyl (DULCOLAX) EC tablet 5 mg (has no administration in time range)     And   bisacodyl (DULCOLAX) suppository 10 mg (has no administration in time range)               MEDICAL DECISION MAKING, PROGRESS, and CONSULTS    MDM: Patient presented emergency department with dyspnea on exertion, anemia.  Otherwise well-appearing, vitals otherwise stable.  Labs significant for hemoglobin of 4.7.  Started PRBC infusion.  Iron studies showing very low iron levels.  Discussed with inpatient team, will admit for further testing and evaluation.    All labs have been independently reviewed by me.  All radiology studies have been reviewed by me and I have also reviewed the radiology report.   EKG's independently viewed and interpreted by me.  Discussion below represents my analysis of pertinent findings related to patient's condition, differential diagnosis, treatment plan and final disposition.      Additional sources:  - Discussed/ obtained  information from independent historians:      - External (non-ED) record review:     - Chronic or social conditions impacting care:     - Shared decision making: Discussed plan for admission, patient agrees      Orders placed during this visit:  Orders Placed This Encounter   Procedures    XR Chest 1 View    Comprehensive Metabolic Panel    Protime-INR    aPTT    BNP    Single High Sensitivity Troponin T    Magnesium    CBC Auto Differential    Iron Profile    Ferritin    Haptoglobin    Lactate Dehydrogenase    Reticulocytes    Vitamin B12    Folate    Hemoglobin & Hematocrit, Blood    Basic Metabolic Panel    CBC (No Diff)    Diet: Liquid Diets; Clear Liquid; Fluid Consistency: Thin (IDDSI 0)    Verify Informed Consent    Vital Signs    Up with assistance    Intake & Output    Daily Weights    Oral Care    Place Sequential Compression Device    Maintain Sequential Compression Device    Telemetry - Maintain IV Access    Telemetry - Place Orders & Notify Provider of Results When Patient Experiences Acute Chest Pain, Dysrhythmia or Respiratory Distress    May Be Off Telemetry for Tests    Code Status and Medical Interventions:    SHELLY (on-call MD unless specified) Details    Hematology & Oncology Inpatient Consult    ECG 12 Lead Dyspnea    Type & Screen    Prepare RBC, 3 Units    Inpatient Admission    Inpatient Admission    CBC & Differential         Additional orders considered but not ordered:  Considered Hemoccult stool however given the patient still has melena or dilan blood will defer at this time.        Differential diagnosis includes but is not limited to:    Anemia, iron deficiency, hemolysis, bone marrow failure, aplastic anemia      Independent interpretation of labs, radiology studies, and discussions with consultants:  ED Course as of 02/20/24 1237   Tue Feb 20, 2024   1043 Hemoglobin(!!): 4.7 [FS]   1102 EKG interpreted myself:  1020, sinus rhythm rate of 78, no acute ST segment changes or T wave  inversions.  Limited somewhat by artifact. [FS]   1102 Chest x-ray interpreted myself:  No infiltrate or pneumothorax [FS]      ED Course User Index  [FS] Kaz Alonso MD           DIAGNOSIS  Final diagnoses:   Microcytic anemia   Dyspnea on exertion         DISPOSITION  Admitted to telemetry        Latest Documented Vital Signs:  As of 12:37 EST  BP- 123/63 HR- 78 Temp- 97 °F (36.1 °C) O2 sat- 99%              --    Please note that portions of this were completed with a voice recognition program.       Note Disclaimer: At [facility], we believe that sharing information builds trust and better relationships. You are receiving this note because you are receiving care at [facility] or recently visited. It is possible you will see health information before a provider has talked with you about it. This kind of information can be easy to misunderstand. To help you fully understand what it means for your health, we urge you to discuss this note with your provider.             Kaz Alonso MD  02/20/24 8277

## 2024-02-20 NOTE — CONSULTS
[facility]  Initial Inpatient Consult Note    Referring Provider: Naina Araiza    Reason for Consultation: Anemia    Subjective     History of present illness:    64 y.o. female presents to the ER complaining of dyspnea.  She is found to have a hemoglobin of 4.9 confirmed on repeat.  She has evidence of iron deficiency by labs.  No evidence of overt GI bleeding.  Prior colonoscopy  with Dr. Vicente relatively unremarkable.    Past Medical History:  Past Medical History:   Diagnosis Date    Abnormal Pap smear of cervix     years ago     Ankle sprain ,     Knee swelling 2021     Past Surgical History:  Past Surgical History:   Procedure Laterality Date    COLONOSCOPY N/A 2021    Procedure: COLONOSCOPY;  Surgeon: Bishnu Alvarez MD;  Location: [facility] MAIN OR;  Service: Gastroenterology;  Laterality: N/A;  hemorrhoids, diverticulosis,    WISDOM TOOTH EXTRACTION      bottom      Social History:   Social History     Tobacco Use    Smoking status: Never    Smokeless tobacco: Never   Substance Use Topics    Alcohol use: Yes     Alcohol/week: 3.0 standard drinks of alcohol     Types: 1 Glasses of wine, 1 Cans of beer, 1 Shots of liquor per week     Comment: occ      Family History:  Family History   Problem Relation Age of Onset    Hypertension Mother     Diabetes Mother          ; stroke complications    Diverticulitis Mother     Stroke Mother     Heart disease Father     Hearing loss Father          2017    Gallbladder disease Sister     Diverticulitis Sister     Breast cancer Neg Hx     Ovarian cancer Neg Hx     Uterine cancer Neg Hx     Colon cancer Neg Hx        Home Meds:  Medications Prior to Admission   Medication Sig Dispense Refill Last Dose    benzonatate (Tessalon Perles) 100 MG capsule Take 2 capsules by mouth 3 (Three) Times a Day As Needed for Cough. 30 capsule 0     losartan (COZAAR) 50 MG tablet TAKE 1 TABLET BY MOUTH EVERY DAY  "(Patient taking differently: Take 1 tablet by mouth Daily.) 90 tablet 1     Multiple Vitamins-Minerals (ICAPS AREDS 2 PO) Take 1 tablet by mouth 2 (Two) Times a Day.       Multiple Vitamins-Minerals (MULTI-VITAMIN GUMMIES PO) Take 1 tablet by mouth Daily.        Current Meds:   losartan, 50 mg, Oral, Daily      Allergies:  No Known Allergies    Objective     Vital Signs  Temp:  [96.9 °F (36.1 °C)-98.7 °F (37.1 °C)] 97.8 °F (36.6 °C)  Heart Rate:  [] 72  Resp:  [16-22] 16  BP: (123-144)/(61-70) 144/68  Physical Exam:  General Appearance:     Alert, cooperative, in no acute distress   Abdomen:     Normal bowel sounds, no masses, no organomegaly, soft     nontender, nondistended, no guarding, no rebound                 tenderness   Rectal:     Deferred       Results Review:   I reviewed the patient's new clinical results.  Prior GI endoscopy records    Results from last 7 days   Lab Units 02/20/24  1011 02/20/24  0830   WBC 10*3/mm3 5.69 6.38   HEMOGLOBIN g/dL 4.7* 4.9*   HEMATOCRIT % 20.4* 21.1*   PLATELETS 10*3/mm3 418 516*     Results from last 7 days   Lab Units 02/20/24  1011 02/15/24  0934   SODIUM mmol/L 139 143   POTASSIUM mmol/L 3.6 4.4   CHLORIDE mmol/L 103 104   CO2 mmol/L 22.9 21   BUN mg/dL 8 9   CREATININE mg/dL 0.69 0.73   CALCIUM mg/dL 8.2* 8.4*   BILIRUBIN mg/dL 0.2 0.3   ALK PHOS U/L 143* 134*   ALT (SGPT) U/L 10 10   AST (SGOT) U/L 15 14   GLUCOSE mg/dL 163* 78     Results from last 7 days   Lab Units 02/20/24  1011   INR  1.18*     No results found for: \"LIPASE\"    Radiology:  XR Chest 1 View   Final Result   No focal consolidation. No pleural effusion or pneumothorax.   Cardiac silhouette at the upper limits of normal, favored partly   projectional. Moderate size hiatal hernia. No focal osseous abnormality.       This report was finalized on 2/20/2024 10:57 AM by RIANNA LagosD on Workstation: [id number]              Assessment & Plan   Assessment:    Symptomatic anemia   Iron " deficiency    Plan:   Plans for 3U PRBCs, recheck H/H after transfusion  Will plan to prep tomorrow for EGD/colonoscopy on Thursday  Ok for low residue diet today, then CLD starting tomorrow AM    I discussed the patients findings and my recommendations with patient.         Wilson Spaulding M.D.  [facility]  [address] [place] [address]  Office: [phone]

## 2024-02-20 NOTE — PLAN OF CARE
Goal Outcome Evaluation:              Outcome Evaluation: new admit from ed for anemia, hgb 4.7, pt is finishing 3/3 RBC, vss, no c/o pain, tolerating po, will continue to monitor

## 2024-02-21 ENCOUNTER — PREP FOR SURGERY (OUTPATIENT)
Dept: SURGERY | Facility: SURGERY CENTER | Age: 64
End: 2024-02-21
Payer: COMMERCIAL

## 2024-02-21 DIAGNOSIS — D64.9 ANEMIA: Primary | ICD-10-CM

## 2024-02-21 LAB
ANION GAP SERPL CALCULATED.3IONS-SCNC: 9 MMOL/L (ref 5–15)
BH BB BLOOD EXPIRATION DATE: NORMAL
BH BB BLOOD TYPE BARCODE: 5100
BH BB DISPENSE STATUS: NORMAL
BH BB PRODUCT CODE: NORMAL
BH BB UNIT NUMBER: NORMAL
BUN SERPL-MCNC: 8 MG/DL (ref 8–23)
BUN/CREAT SERPL: 12.5 (ref 7–25)
CALCIUM SPEC-SCNC: 8.2 MG/DL (ref 8.6–10.5)
CHLORIDE SERPL-SCNC: 106 MMOL/L (ref 98–107)
CO2 SERPL-SCNC: 27 MMOL/L (ref 22–29)
CREAT SERPL-MCNC: 0.64 MG/DL (ref 0.57–1)
CROSSMATCH INTERPRETATION: NORMAL
DEPRECATED RDW RBC AUTO: 55.4 FL (ref 37–54)
EGFRCR SERPLBLD CKD-EPI 2021: 98.8 ML/MIN/1.73
ERYTHROCYTE [DISTWIDTH] IN BLOOD BY AUTOMATED COUNT: 26.1 % (ref 12.3–15.4)
FOLATE SERPL-MCNC: 18.3 NG/ML (ref 4.78–24.2)
GLUCOSE SERPL-MCNC: 86 MG/DL (ref 65–99)
HCT VFR BLD AUTO: 26.5 % (ref 34–46.6)
HCT VFR BLD AUTO: 27.2 % (ref 34–46.6)
HCT VFR BLD AUTO: 27.3 % (ref 34–46.6)
HCT VFR BLD AUTO: 27.5 % (ref 34–46.6)
HGB BLD-MCNC: 7 G/DL (ref 12–15.9)
HGB BLD-MCNC: 7.2 G/DL (ref 12–15.9)
HGB BLD-MCNC: 7.2 G/DL (ref 12–15.9)
HGB BLD-MCNC: 7.3 G/DL (ref 12–15.9)
MCH RBC QN AUTO: 16.9 PG (ref 26.6–33)
MCHC RBC AUTO-ENTMCNC: 26.5 G/DL (ref 31.5–35.7)
MCV RBC AUTO: 63.8 FL (ref 79–97)
PLATELET # BLD AUTO: 371 10*3/MM3 (ref 140–450)
PMV BLD AUTO: 9.2 FL (ref 6–12)
POTASSIUM SERPL-SCNC: 3.9 MMOL/L (ref 3.5–5.2)
RBC # BLD AUTO: 4.26 10*6/MM3 (ref 3.77–5.28)
SODIUM SERPL-SCNC: 142 MMOL/L (ref 136–145)
UNIT  ABO: NORMAL
UNIT  RH: NORMAL
VIT B12 BLD-MCNC: 581 PG/ML (ref 211–946)
WBC NRBC COR # BLD AUTO: 6.79 10*3/MM3 (ref 3.4–10.8)

## 2024-02-21 PROCEDURE — 85014 HEMATOCRIT: CPT | Performed by: NURSE PRACTITIONER

## 2024-02-21 PROCEDURE — 82607 VITAMIN B-12: CPT | Performed by: NURSE PRACTITIONER

## 2024-02-21 PROCEDURE — 99214 OFFICE O/P EST MOD 30 MIN: CPT | Performed by: INTERNAL MEDICINE

## 2024-02-21 PROCEDURE — 85014 HEMATOCRIT: CPT | Performed by: HOSPITALIST

## 2024-02-21 PROCEDURE — G0378 HOSPITAL OBSERVATION PER HR: HCPCS

## 2024-02-21 PROCEDURE — 82746 ASSAY OF FOLIC ACID SERUM: CPT | Performed by: NURSE PRACTITIONER

## 2024-02-21 PROCEDURE — 25810000003 SODIUM CHLORIDE 0.9 % SOLUTION: Performed by: HOSPITALIST

## 2024-02-21 PROCEDURE — 85027 COMPLETE CBC AUTOMATED: CPT | Performed by: NURSE PRACTITIONER

## 2024-02-21 PROCEDURE — 85018 HEMOGLOBIN: CPT | Performed by: NURSE PRACTITIONER

## 2024-02-21 PROCEDURE — 25010000002 NA FERRIC GLUC CPLX PER 12.5 MG: Performed by: HOSPITALIST

## 2024-02-21 PROCEDURE — 85018 HEMOGLOBIN: CPT | Performed by: HOSPITALIST

## 2024-02-21 PROCEDURE — 80048 BASIC METABOLIC PNL TOTAL CA: CPT | Performed by: NURSE PRACTITIONER

## 2024-02-21 RX ORDER — POLYETHYLENE GLYCOL 3350 17 G/17G
0.5 POWDER, FOR SOLUTION ORAL EVERY 12 HOURS
Status: DISCONTINUED | OUTPATIENT
Start: 2024-02-21 | End: 2024-02-21

## 2024-02-21 RX ORDER — POLYETHYLENE GLYCOL 3350 17 G/17G
0.5 POWDER, FOR SOLUTION ORAL EVERY 12 HOURS
Status: COMPLETED | OUTPATIENT
Start: 2024-02-22 | End: 2024-02-22

## 2024-02-21 RX ADMIN — SODIUM CHLORIDE 250 MG: 9 INJECTION, SOLUTION INTRAVENOUS at 17:53

## 2024-02-21 RX ADMIN — LOSARTAN POTASSIUM 50 MG: 50 TABLET, FILM COATED ORAL at 09:15

## 2024-02-21 RX ADMIN — POLYETHYLENE GLYCOL 3350 0.5 BOTTLE: 17 POWDER, FOR SOLUTION ORAL at 17:44

## 2024-02-21 NOTE — PLAN OF CARE
Goal Outcome Evaluation:  Plan of Care Reviewed With: patient        Progress: improving  Outcome Evaluation: vss. telemetry monitor, SR. Alert and oriented x4, room air. up ad garcia. prep to begin.

## 2024-02-21 NOTE — PROGRESS NOTES
Name: Dudley Estrada ADMIT: 2024   : 1960  PCP: Haydee Sherman APRN    MRN: [id number] LOS: 1 days   AGE/SEX: 64 y.o. female  ROOM: [facility]     Subjective   Subjective   Feels better following blood transfusions.  No reactions.  Tolerating iron as well.       Objective   Objective   Vital Signs  Temp:  [97.8 °F (36.6 °C)-99.6 °F (37.6 °C)] 98.1 °F (36.7 °C)  Heart Rate:  [67-90] 67  Resp:  [16-24] 18  BP: (131-171)/(68-85) 171/85  SpO2:  [95 %-97 %] 97 %  on   ;   Device (Oxygen Therapy): room air  Body mass index is 34.56 kg/m².  Physical Exam  Vitals and nursing note reviewed.   Constitutional:       General: She is not in acute distress.  Cardiovascular:      Rate and Rhythm: Normal rate and regular rhythm.   Pulmonary:      Effort: Pulmonary effort is normal.      Breath sounds: Normal breath sounds.   Abdominal:      General: Bowel sounds are normal.      Palpations: Abdomen is soft.      Tenderness: There is no abdominal tenderness.   Musculoskeletal:         General: No swelling.   Skin:     General: Skin is warm and dry.      Coloration: Skin is pale.   Neurological:      Mental Status: She is alert. Mental status is at baseline.       Results Review     I reviewed the patient's new clinical results.  Results from last 7 days   Lab Units 24  0657 24  0039 24  1011 24  0830 24  1218   WBC 10*3/mm3 6.79  --  5.69 6.38 5.6   HEMOGLOBIN g/dL 7.2* 7.0* 4.7* 4.9* 5.0*   PLATELETS 10*3/mm3 371  --  418 516* 454*     Results from last 7 days   Lab Units 24  0657 24  1011 02/15/24  0934   SODIUM mmol/L 142 139 143   POTASSIUM mmol/L 3.9 3.6 4.4   CHLORIDE mmol/L 106 103 104   CO2 mmol/L 27.0 22.9 21   BUN mg/dL 8 8 9   CREATININE mg/dL 0.64 0.69 0.73   GLUCOSE mg/dL 86 163* 78   EGFR mL/min/1.73 98.8 97.1  --      Results from last 7 days   Lab Units 24  1011 02/15/24  0934   ALBUMIN g/dL 3.8 4.0   BILIRUBIN mg/dL 0.2 0.3   ALK PHOS U/L 143* 134*    AST (SGOT) U/L 15 14   ALT (SGPT) U/L 10 10     Results from last 7 days   Lab Units 02/21/24  0657 02/20/24  1011 02/15/24  0934   CALCIUM mg/dL 8.2* 8.2* 8.4*   ALBUMIN g/dL  --  3.8 4.0   MAGNESIUM mg/dL  --  2.2  --        Results from last 7 days   Lab Units 02/21/24  0657 02/20/24  1011   IRON mcg/dL  --  16*   IRON SATURATION (TSAT) %  --  3*   TRANSFERRIN mg/dL  --  333   TIBC mcg/dL  --  496   FERRITIN ng/mL  --  3.88*   VITAMIN B 12 pg/mL 581  --    FOLATE ng/mL 18.30  --    RETIC % %  --  1.28       I have personally reviewed all medications:  Scheduled Medications  ferric gluconate, 250 mg, Intravenous, Q24H  losartan, 50 mg, Oral, Daily  polyethylene glycol, 0.5 bottle, Oral, Q12H    Infusions   Diet  Diet: Liquid Diets; Clear Liquid; Fluid Consistency: Thin (IDDSI 0)  NPO Diet NPO Type: Strict NPO    I have personally reviewed:  [x]  Laboratory   []  Microbiology   []  Radiology   [x]  EKG/Telemetry  []  Cardiology/Vascular   []  Pathology    []  Records       Assessment/Plan     Active Hospital Problems    Diagnosis  POA    **Symptomatic anemia [D64.9]  Yes    Iron deficiency anemia due to chronic blood loss [D50.0]  Yes    Diverticulosis [K57.90]  Yes    Obesity (BMI 30-39.9) [E66.9]  Yes    Essential hypertension [I10]  Yes      Resolved Hospital Problems   No resolved problems to display.       64 y.o. female admitted with Symptomatic anemia.    Hemoglobin improved following transfusion.  Continue monitoring and transfuse for hemoglobin less than 7.  Should get second dose IV iron today.  Hematology consult pending.  Plan for bidirectional endoscopy tomorrow.       SCDs for DVT prophylaxis.  Full code.  Discussed with patient.  Anticipate discharge home with family timing yet to be determined.      Royer De La Cruz MD  [facility]  02/21/24  12:16 EST

## 2024-02-21 NOTE — CASE MANAGEMENT/SOCIAL WORK
Discharge Planning Assessment  [facility]     Patient Name: Dudley Estrada  MRN: [id number]  Today's Date: 2/21/2024    Admit Date: 2/20/2024    Plan: Home with no needs   Discharge Needs Assessment       Row Name 02/21/24 1348       Living Environment    People in Home spouse    Current Living Arrangements home    Potentially Unsafe Housing Conditions none    In the past 12 months has the electric, gas, oil, or water company threatened to shut off services in your home? No    Primary Care Provided by self    Provides Primary Care For no one    Family Caregiver if Needed none    Quality of Family Relationships supportive    Able to Return to Prior Arrangements yes       Resource/Environmental Concerns    Resource/Environmental Concerns none    Transportation Concerns none       Transportation Needs    In the past 12 months, has lack of transportation kept you from medical appointments or from getting medications? no    In the past 12 months, has lack of transportation kept you from meetings, work, or from getting things needed for daily living? No       Food Insecurity    Within the past 12 months, you worried that your food would run out before you got the money to buy more. Never true    Within the past 12 months, the food you bought just didn't last and you didn't have money to get more. Never true       Transition Planning    Patient/Family Anticipates Transition to home with family    Patient/Family Anticipated Services at Transition none    Transportation Anticipated family or friend will provide       Discharge Needs Assessment    Equipment Currently Used at Home none    Concerns to be Addressed discharge planning    Anticipated Changes Related to Illness none    Equipment Needed After Discharge none                   Discharge Plan       Row Name 02/21/24 1349       Plan    Plan Home with no needs    Plan Comments CCP spoke to patient and her  Miquel, [phone] at bedside.  Face sheet verified.  Pt  obtains her medications from [facility] [employer] on [address].  Pt lives in a single story house with a basement with her .  She has no difficulty with the stairs  She uses no DME to ambulate  She has not fallen in the last one month.  She has no history of HH or rehab  Pt plans home  She denies needs  CCP following                  Continued Care and Services - Admitted Since 2/20/2024    Coordination has not been started for this encounter.          Demographic Summary    No documentation.                  Functional Status    No documentation.                  Psychosocial    No documentation.                  Abuse/Neglect    No documentation.                  Legal    No documentation.                  Substance Abuse    No documentation.                  Patient Forms    No documentation.                     Marry Cartwright RN

## 2024-02-21 NOTE — PAYOR COMM NOTE
"Dudley Ramirez (64 y.o. Female)                               ATTENTION INITIAL CLINICALS AUTH PENDING [id number]                          REPLY TO [facility] DEPT  [phone] OR CALL   MATTHEW PORTER LPN           Date of Birth   1960    Social Security Number       Address   [address] [place] [id number]    Home Phone   [phone]    MRN   [id number]       Episcopalian   Patient Refused    Marital Status                               Admission Date   2/20/24    Admission Type   Emergency    Admitting Provider   Royer De La Cruz MD    Attending Provider   Royer De La Cruz MD    Department, Room/Bed   [facility] CORONARY CARE, N334/1       Discharge Date       Discharge Disposition       Discharge Destination                                 Attending Provider: Royer De La Cruz MD    Allergies: No Known Allergies    Isolation: None   Infection: None   Code Status: CPR    Ht: 160 cm (63\")   Wt: 88.5 kg (195 lb 1.7 oz)    Admission Cmt: None   Principal Problem: Symptomatic anemia [D64.9]                   Active Insurance as of 2/20/2024       Primary Coverage       Payor Plan Insurance Group Employer/Plan Group    [employer] H[employer] [id number]       Payor Plan Address Payor Plan Phone Number Payor Plan Fax Number Effective Dates    [address] [phone]  1/1/2024 - None Entered    [place] [address]         Subscriber Name Subscriber Birth Date Member ID       DUDLEY RAMIREZ 1960 [id number]                     Emergency Contacts        (Rel.) Home Phone Work Phone Mobile Phone    Miquel Ramirez (Spouse) -- -- [phone]                 History & Physical        Naina Araiza APRN at 02/20/24 1102       Attestation signed by Royer De La Cruz MD at 02/20/24 1622    I have seen and examined the patient, performing an independent face-to-face diagnostic evaluation with plan of care reviewed and developed with TANA Barnett.  The majority of " medical decision making (>50%) for this encounter was completed by myself and discussed with this APRN.  64 y.o. female presents with dyspnea on exertion, weakness and fatigue.  Symptoms have been slowly progressive over several weeks.  She denies any change in bowel habits.  She has infrequent use of NSAIDs.  She had colonoscopy about 3 years ago that was relatively unremarkable.  This was a screening colonoscopy and she was having no GI issues at that time.  On exam she is pale in appearance but no distress and not particularly ill-appearing.  Respirations are unlabored heart rate normal.  Data I have personally reviewed:  [x]  Laboratory   [x]  Microbiology   [x]  Radiology   [x]  EKG/Telemetry   [x]  Cardiology/Vascular   []  Pathology   [x]  Old records    Assessment/Plan  Patient presents with profound symptomatic iron deficiency anemia.  She is currently getting 3 units of blood.  She will also order IV iron.  Hematology and GI consulted.  Will anticipate need for bidirectional endoscopy.  Monitor BP, continue losartan.    Active Hospital Problems    Diagnosis  POA    **Symptomatic anemia [D64.9]  Yes    Iron deficiency anemia due to chronic blood loss [D50.0]  Yes    Diverticulosis [K57.90]  Yes    Obesity (BMI 30-39.9) [E66.9]  Yes    Essential hypertension [I10]  Yes      Resolved Hospital Problems   No resolved problems to display.   Electronically signed by Royer De La Cruz MD, 2/20/2024, 16:21 EST.                         Patient Name:  Dudley Estrada  YOB: 1960  MRN:  [id number]  Admit Date:  2/20/2024  Patient Care Team:  Haydee Sherman APRN as PCP - General (Family Medicine)      Subjective  History Present Illness     Chief Complaint   Patient presents with    Abnormal Lab       Ms. Estrada is a 64 y.o. with a history of HTN, obesity that presents with dyspnea on exertion and hemoglobin on outside labs of 4.9 found to be 4.7 in the ER with an MCV of 56.  For the past few weeks she  has noticed increased fatigue and shortness of breath with exertion but denies dizziness, chest pain, palpitations or lightheadedness.  She denies overt  GI bleeding, vaginal bleeding, epistaxis or other blood loss.  She denies abdominal pain, nausea, vomiting or change in bowels.  Patient had a colonoscopy in  with diverticulosis and hemorrhoids but no other abnormalities.  No known family history of colon cancer.  No prior hemoglobin values available in EMR to compare to current value.     History of Present Illness    Review of Systems   Constitutional:  Positive for activity change and fatigue. Negative for appetite change and unexpected weight change.   HENT:  Negative for nosebleeds and trouble swallowing.    Respiratory:  Positive for shortness of breath. Negative for choking.    Cardiovascular:  Negative for chest pain.   Gastrointestinal:  Negative for abdominal distention, abdominal pain, anal bleeding, blood in stool, constipation, diarrhea, nausea, rectal pain and vomiting.   Genitourinary:  Negative for dysuria, menstrual problem and vaginal bleeding.   Musculoskeletal:  Negative for back pain.   Skin:  Negative for color change.   Neurological:  Negative for dizziness.   Hematological:  Does not bruise/bleed easily.   Psychiatric/Behavioral: Negative.          Personal History     Past Medical History:   Diagnosis Date    Abnormal Pap smear of cervix     years ago     Ankle sprain ,     Knee swelling 2021     Past Surgical History:   Procedure Laterality Date    COLONOSCOPY N/A 2021    Procedure: COLONOSCOPY;  Surgeon: Bishnu Alvarez MD;  [place] [facility] [place];  Service: Gastroenterology;  Laterality: N/A;  hemorrhoids, diverticulosis,    WISDOM TOOTH EXTRACTION      bottom     Family History   Problem Relation Age of Onset    Hypertension Mother     Diabetes Mother          ; stroke complications    Diverticulitis Mother     Stroke Mother     Heart disease  Father     Hearing loss Father          2017    Gallbladder disease Sister     Diverticulitis Sister     Breast cancer Neg Hx     Ovarian cancer Neg Hx     Uterine cancer Neg Hx     Colon cancer Neg Hx      Social History     Tobacco Use    Smoking status: Never    Smokeless tobacco: Never   Vaping Use    Vaping Use: Never used   Substance Use Topics    Alcohol use: Yes     Alcohol/week: 3.0 standard drinks of alcohol     Types: 1 Glasses of wine, 1 Cans of beer, 1 Shots of liquor per week     Comment: occ    Drug use: Never     No current facility-administered medications on file prior to encounter.     Current Outpatient Medications on File Prior to Encounter   Medication Sig Dispense Refill    benzonatate (Tessalon Perles) 100 MG capsule Take 2 capsules by mouth 3 (Three) Times a Day As Needed for Cough. 30 capsule 0    losartan (COZAAR) 50 MG tablet TAKE 1 TABLET BY MOUTH EVERY DAY (Patient taking differently: Take 1 tablet by mouth Daily.) 90 tablet 1    Multiple Vitamins-Minerals (ICAPS AREDS 2 PO) Take 1 tablet by mouth 2 (Two) Times a Day.      Multiple Vitamins-Minerals (MULTI-VITAMIN GUMMIES PO) Take 1 tablet by mouth Daily.       No Known Allergies    Objective   Objective     Vital Signs  Temp:  [96.9 °F (36.1 °C)-98.7 °F (37.1 °C)] 97.8 °F (36.6 °C)  Heart Rate:  [] 72  Resp:  [16-22] 16  BP: (123-144)/(61-70) 144/68  SpO2:  [96 %-100 %] 97 %  on   ;   Device (Oxygen Therapy): room air  Body mass index is 34.01 kg/m².    Physical Exam  Vitals and nursing note reviewed.   Constitutional:       Appearance: She is well-developed. She is obese.   HENT:      Head: Normocephalic and atraumatic.   Eyes:      Pupils: Pupils are equal, round, and reactive to light.   Cardiovascular:      Rate and Rhythm: Normal rate and regular rhythm.      Heart sounds: Normal heart sounds.   Pulmonary:      Effort: Pulmonary effort is normal.      Breath sounds: Normal breath sounds.   Abdominal:      General:  Bowel sounds are normal. There is no distension or abdominal bruit.      Palpations: Abdomen is soft. Abdomen is not rigid. There is no shifting dullness, fluid wave, mass or pulsatile mass.      Tenderness: There is no abdominal tenderness. There is no guarding.      Hernia: No hernia is present.   Musculoskeletal:         General: Normal range of motion.   Skin:     General: Skin is warm and dry.      Coloration: Skin is pale.   Neurological:      Mental Status: She is alert and oriented to person, place, and time.   Psychiatric:         Behavior: Behavior normal.         Thought Content: Thought content normal.         Results Review:  I reviewed the patient's new clinical results.  I reviewed the patient's new imaging results and agree with the interpretation.  I reviewed the patient's other test results and agree with the interpretation  I personally viewed and interpreted the patient's EKG/Telemetry data  Discussed with ED provider.    Lab Results (last 24 hours)       Procedure Component Value Units Date/Time    Calcium, Ionized [[id number]]  (Normal) Collected: 02/20/24 0830    Specimen: Blood Updated: 02/20/24 0931     Ionized Calcium 1.17 mmol/L      Ionized Calcium 4.7 mg/dL     CBC & Differential [[id number]]  (Abnormal) Collected: 02/20/24 0830    Specimen: Blood Updated: 02/20/24 0942    Narrative:      The following orders were created for panel order CBC & Differential.  Procedure                               Abnormality         Status                     ---------                               -----------         ------                     CBC Auto Differential[[id number]]        Abnormal            Final result                 Please view results for these tests on the individual orders.    CBC Auto Differential [[id number]]  (Abnormal) Collected: 02/20/24 0830    Specimen: Blood Updated: 02/20/24 0942     WBC 6.38 10*3/mm3      RBC 3.61 10*6/mm3      Hemoglobin 4.9 g/dL      Hematocrit 21.1 %       MCV 58.4 fL      MCH 13.6 pg      MCHC 23.2 g/dL      RDW 20.8 %      RDW-SD 40.8 fl      MPV 9.2 fL      Platelets 516 10*3/mm3     Manual Differential [[id number]]  (Abnormal) Collected: 02/20/24 0830    Specimen: Blood Updated: 02/20/24 0942     Neutrophil % 71.0 %      Lymphocyte % 20.0 %      Monocyte % 4.0 %      Eosinophil % 3.0 %      Basophil % 2.0 %      Neutrophils Absolute 4.53 10*3/mm3      Lymphocytes Absolute 1.28 10*3/mm3      Monocytes Absolute 0.26 10*3/mm3      Eosinophils Absolute 0.19 10*3/mm3      Basophils Absolute 0.13 10*3/mm3      nRBC 2.0 /100 WBC      Anisocytosis Mod/2+     Hypochromia Mod/2+     Microcytes Large/3+     WBC Morphology Normal     Platelet Morphology Normal    CBC & Differential [[id number]]  (Abnormal) Collected: 02/20/24 1011    Specimen: Blood Updated: 02/20/24 1037    Narrative:      The following orders were created for panel order CBC & Differential.  Procedure                               Abnormality         Status                     ---------                               -----------         ------                     CBC Auto Differential[[id number]]        Abnormal            Final result                 Please view results for these tests on the individual orders.    Comprehensive Metabolic Panel [[id number]]  (Abnormal) Collected: 02/20/24 1011    Specimen: Blood Updated: 02/20/24 1043     Glucose 163 mg/dL      BUN 8 mg/dL      Creatinine 0.69 mg/dL      Sodium 139 mmol/L      Potassium 3.6 mmol/L      Chloride 103 mmol/L      CO2 22.9 mmol/L      Calcium 8.2 mg/dL      Total Protein 6.6 g/dL      Albumin 3.8 g/dL      ALT (SGPT) 10 U/L      AST (SGOT) 15 U/L      Alkaline Phosphatase 143 U/L      Total Bilirubin 0.2 mg/dL      Globulin 2.8 gm/dL      A/G Ratio 1.4 g/dL      BUN/Creatinine Ratio 11.6     Anion Gap 13.1 mmol/L      eGFR 97.1 mL/min/1.73     Narrative:      GFR Normal >60  Chronic Kidney Disease <60  Kidney Failure <15      Protime-INR [[id number]]   (Abnormal) Collected: 02/20/24 1011    Specimen: Blood Updated: 02/20/24 1032     Protime 15.1 Seconds      INR 1.18    aPTT [[id number]]  (Normal) Collected: 02/20/24 1011    Specimen: Blood Updated: 02/20/24 1032     PTT 25.6 seconds     BNP [[id number]]  (Normal) Collected: 02/20/24 1011    Specimen: Blood Updated: 02/20/24 1041     proBNP 159.0 pg/mL     Narrative:      This assay is used as an aid in the diagnosis of individuals suspected of having heart failure. It can be used as an aid in the diagnosis of acute decompensated heart failure (ADHF) in patients presenting with signs and symptoms of ADHF to the emergency department (ED). In addition, NT-proBNP of <300 pg/mL indicates ADHF is not likely.    Age Range Result Interpretation  NT-proBNP Concentration (pg/mL:      <50             Positive            >450                   Gray                 300-450                    Negative             <300    50-75           Positive            >900                  Gray                300-900                  Negative            <300      >75             Positive            >1800                  Gray                300-1800                  Negative            <300    Single High Sensitivity Troponin T [[id number]]  (Abnormal) Collected: 02/20/24 1011    Specimen: Blood Updated: 02/20/24 1043     HS Troponin T 20 ng/L     Narrative:      High Sensitive Troponin T Reference Range:  <14.0 ng/L- Negative Female for AMI  <22.0 ng/L- Negative Male for AMI  >=14 - Abnormal Female indicating possible myocardial injury.  >=22 - Abnormal Male indicating possible myocardial injury.   Clinicians would have to utilize clinical acumen, EKG, Troponin, and serial changes to determine if it is an Acute Myocardial Infarction or myocardial injury due to an underlying chronic condition.         Magnesium [[id number]]  (Normal) Collected: 02/20/24 1011    Specimen: Blood Updated: 02/20/24 1043     Magnesium 2.2 mg/dL     CBC Auto  Differential [[id number]]  (Abnormal) Collected: 02/20/24 1011    Specimen: Blood Updated: 02/20/24 1037     WBC 5.69 10*3/mm3      RBC 3.59 10*6/mm3      Hemoglobin 4.7 g/dL      Hematocrit 20.4 %      MCV 56.8 fL      MCH 13.1 pg      MCHC 23.0 g/dL      RDW 19.6 %      RDW-SD 37.8 fl      MPV 8.3 fL      Platelets 418 10*3/mm3      Neutrophil % 70.0 %      Lymphocyte % 17.4 %      Monocyte % 7.6 %      Eosinophil % 3.9 %      Basophil % 0.4 %      Neutrophils, Absolute 3.99 10*3/mm3      Lymphocytes, Absolute 0.99 10*3/mm3      Monocytes, Absolute 0.43 10*3/mm3      Eosinophils, Absolute 0.22 10*3/mm3      Basophils, Absolute 0.02 10*3/mm3     Iron Profile [[id number]]  (Abnormal) Collected: 02/20/24 1011    Specimen: Blood Updated: 02/20/24 1119     Iron 16 mcg/dL      Iron Saturation (TSAT) 3 %      Transferrin 333 mg/dL      TIBC 496 mcg/dL     Ferritin [[id number]]  (Abnormal) Collected: 02/20/24 1011    Specimen: Blood Updated: 02/20/24 1125     Ferritin 3.88 ng/mL     Narrative:      Results may be falsely decreased if patient taking Biotin.      Haptoglobin [[id number]]  (Normal) Collected: 02/20/24 1011    Specimen: Blood Updated: 02/20/24 1119     Haptoglobin 169 mg/dL     Lactate Dehydrogenase [[id number]]  (Normal) Collected: 02/20/24 1011    Specimen: Blood Updated: 02/20/24 1119      U/L     Reticulocytes [[id number]]  (Normal) Collected: 02/20/24 1011    Specimen: Blood Updated: 02/20/24 1148     Reticulocyte % 1.28 %      Reticulocyte Absolute 0.0456 10*6/mm3             Imaging Results (Last 24 Hours)       Procedure Component Value Units Date/Time    XR Chest 1 View [[id number]] Collected: 02/20/24 1054     Updated: 02/20/24 1101    Narrative:      XR CHEST 1 VW-     INDICATION: Dyspnea and cough     COMPARISON: Chest radiograph 2/19/2024       Impression:      No focal consolidation. No pleural effusion or pneumothorax.  Cardiac silhouette at the upper limits of normal, favored  partly  projectional. Moderate size hiatal hernia. No focal osseous abnormality.     This report was finalized on 2/20/2024 10:57 AM by Dr. Carlos Houston M.D on Workstation: [id number]                   ECG 12 Lead Dyspnea   Preliminary Result   HEART RATE= 78  bpm   RR Interval= 769  ms   IN Interval= 155  ms   P Horizontal Axis= 1  deg   P Front Axis= 52  deg   QRSD Interval= 89  ms   QT Interval= 403  ms   QTcB= 460  ms   QRS Axis= -19  deg   T Wave Axis= 57  deg   - ABNORMAL ECG -   Sinus rhythm   Probable left atrial enlargement   Borderline left axis deviation   Nonspecific repolarization abnormalities   Electronically Signed By:    Date and Time of Study: 2024-02-20 10:20:27           Assessment/Plan     Active Hospital Problems    Diagnosis  POA    **Symptomatic anemia [D64.9]  Yes    Dyspnea on exertion [R06.09]  Yes    Anemia [D64.9]  Yes    Obesity (BMI 30-39.9) [E66.9]  Yes    Essential hypertension [I10]  Yes      Resolved Hospital Problems   No resolved problems to display.       Ms. Estrada is a 64 y.o. admitted with a hemoglobin of 4.7 without overt bleeding, vital signs stable but symptomatic with dyspnea on exertion and fatigue.     3 units packed red blood cells ordered.  Trend hemoglobin with serial H&H.  Consult hematology for evaluation. Anemia studies ordered.    She did have normal screening colonoscopy in 2021.  Consult GI for further evaluation of severe CAM      I discussed the patient's findings and my recommendations with patient, family, and nursing staff.    VTE Prophylaxis - SCDs.  Code Status - Full code.       TANA Lozano  [facility]  02/20/24  14:22 EST    Electronically signed by Royer De La Cruz MD at 02/20/24 1622          Emergency Department Notes        Kaz Alonso MD at 02/20/24 1237           EMERGENCY DEPARTMENT ENCOUNTER    Room Number:  14/14  PCP: Haydee Sherman APRN  History obtained from: Patient      HPI:  Chief  Complaint: Dyspnea  A complete HPI/ROS/PMH/PSH/SH/FH are unobtainable due to: N/A  Context: Dudley Estrada is a 64 y.o. female who presents to the ED c/o dyspnea on exertion.  Ongoing for last month, worsened over the last week.  No melena or hematochezia.  Had outpatient labs which showed low hemoglobin.  Patient does not have a restrictive diet, has never been iron deficient before.  Denies any other sources of bleeding.  No other recent illness, fever, chills.            PAST MEDICAL HISTORY  Active Ambulatory Problems     Diagnosis Date Noted    Essential hypertension 2019    Obesity (BMI 30-39.9) 2021    Calculus of gallbladder without cholecystitis without obstruction 2023     Resolved Ambulatory Problems     Diagnosis Date Noted    No Resolved Ambulatory Problems     Past Medical History:   Diagnosis Date    Abnormal Pap smear of cervix     Ankle sprain ,     Knee swelling 2021         PAST SURGICAL HISTORY  Past Surgical History:   Procedure Laterality Date    COLONOSCOPY N/A 2021    Procedure: COLONOSCOPY;  Surgeon: Bishnu Alvarez MD;  [place] [facility] [place];  Service: Gastroenterology;  Laterality: N/A;  hemorrhoids, diverticulosis,    WISDOM TOOTH EXTRACTION      bottom         FAMILY HISTORY  Family History   Problem Relation Age of Onset    Hypertension Mother     Diabetes Mother          ; stroke complications    Diverticulitis Mother     Stroke Mother     Heart disease Father     Hearing loss Father          2017    Gallbladder disease Sister     Diverticulitis Sister     Breast cancer Neg Hx     Ovarian cancer Neg Hx     Uterine cancer Neg Hx     Colon cancer Neg Hx          SOCIAL HISTORY  Social History     Socioeconomic History    Marital status:    Tobacco Use    Smoking status: Never    Smokeless tobacco: Never   Vaping Use    Vaping Use: Never used   Substance and Sexual Activity    Alcohol use: Yes     Alcohol/week: 3.0  standard drinks of alcohol     Types: 1 Glasses of wine, 1 Cans of beer, 1 Shots of liquor per week     Comment: occ    Drug use: Never    Sexual activity: Yes     Partners: Male     Birth control/protection: Post-menopausal         ALLERGIES  Patient has no known allergies.        REVIEW OF SYSTEMS    As per HPI      PHYSICAL EXAM  ED Triage Vitals   Temp Heart Rate Resp BP SpO2   02/20/24 0945 02/20/24 0945 02/20/24 0945 02/20/24 0946 02/20/24 0945   96.9 °F (36.1 °C) 118 20 143/63 100 %      Temp src Heart Rate Source Patient Position BP Location FiO2 (%)   02/20/24 1117 02/20/24 1138 02/20/24 0946 02/20/24 1138 --   Tympanic Monitor Sitting Left arm        Physical Exam  Constitutional:       General: She is not in acute distress.  HENT:      Head: Normocephalic and atraumatic.   Cardiovascular:      Rate and Rhythm: Normal rate and regular rhythm.   Pulmonary:      Effort: Pulmonary effort is normal. No respiratory distress.   Abdominal:      General: There is no distension.      Palpations: Abdomen is soft.      Tenderness: There is no abdominal tenderness.   Musculoskeletal:         General: No swelling or deformity.   Skin:     General: Skin is warm and dry.      Coloration: Skin is pale.   Neurological:      Mental Status: She is alert. Mental status is at baseline.           Vital signs and nursing notes reviewed.          LAB RESULTS  Recent Results (from the past 24 hour(s))   Calcium, Ionized    Collection Time: 02/20/24  8:30 AM    Specimen: Blood   Result Value Ref Range    Ionized Calcium 1.17 1.15 - 1.35 mmol/L    Ionized Calcium 4.7 4.6 - 5.4 mg/dL   CBC Auto Differential    Collection Time: 02/20/24  8:30 AM    Specimen: Blood   Result Value Ref Range    WBC 6.38 3.40 - 10.80 10*3/mm3    RBC 3.61 (L) 3.77 - 5.28 10*6/mm3    Hemoglobin 4.9 (C) 12.0 - 15.9 g/dL    Hematocrit 21.1 (L) 34.0 - 46.6 %    MCV 58.4 (L) 79.0 - 97.0 fL    MCH 13.6 (L) 26.6 - 33.0 pg    MCHC 23.2 (L) 31.5 - 35.7 g/dL    RDW  20.8 (H) 12.3 - 15.4 %    RDW-SD 40.8 37.0 - 54.0 fl    MPV 9.2 6.0 - 12.0 fL    Platelets 516 (H) 140 - 450 10*3/mm3   Manual Differential    Collection Time: 02/20/24  8:30 AM    Specimen: Blood   Result Value Ref Range    Neutrophil % 71.0 42.7 - 76.0 %    Lymphocyte % 20.0 19.6 - 45.3 %    Monocyte % 4.0 (L) 5.0 - 12.0 %    Eosinophil % 3.0 0.3 - 6.2 %    Basophil % 2.0 (H) 0.0 - 1.5 %    Neutrophils Absolute 4.53 1.70 - 7.00 10*3/mm3    Lymphocytes Absolute 1.28 0.70 - 3.10 10*3/mm3    Monocytes Absolute 0.26 0.10 - 0.90 10*3/mm3    Eosinophils Absolute 0.19 0.00 - 0.40 10*3/mm3    Basophils Absolute 0.13 0.00 - 0.20 10*3/mm3    nRBC 2.0 (H) 0.0 - 0.2 /100 WBC    Anisocytosis Mod/2+ None Seen    Hypochromia Mod/2+ None Seen    Microcytes Large/3+ None Seen    WBC Morphology Normal Normal    Platelet Morphology Normal Normal   Comprehensive Metabolic Panel    Collection Time: 02/20/24 10:11 AM    Specimen: Blood   Result Value Ref Range    Glucose 163 (H) 65 - 99 mg/dL    BUN 8 8 - 23 mg/dL    Creatinine 0.69 0.57 - 1.00 mg/dL    Sodium 139 136 - 145 mmol/L    Potassium 3.6 3.5 - 5.2 mmol/L    Chloride 103 98 - 107 mmol/L    CO2 22.9 22.0 - 29.0 mmol/L    Calcium 8.2 (L) 8.6 - 10.5 mg/dL    Total Protein 6.6 6.0 - 8.5 g/dL    Albumin 3.8 3.5 - 5.2 g/dL    ALT (SGPT) 10 1 - 33 U/L    AST (SGOT) 15 1 - 32 U/L    Alkaline Phosphatase 143 (H) 39 - 117 U/L    Total Bilirubin 0.2 0.0 - 1.2 mg/dL    Globulin 2.8 gm/dL    A/G Ratio 1.4 g/dL    BUN/Creatinine Ratio 11.6 7.0 - 25.0    Anion Gap 13.1 5.0 - 15.0 mmol/L    eGFR 97.1 >60.0 mL/min/1.73   Protime-INR    Collection Time: 02/20/24 10:11 AM    Specimen: Blood   Result Value Ref Range    Protime 15.1 (H) 11.7 - 14.2 Seconds    INR 1.18 (H) 0.90 - 1.10   aPTT    Collection Time: 02/20/24 10:11 AM    Specimen: Blood   Result Value Ref Range    PTT 25.6 22.7 - 35.4 seconds   BNP    Collection Time: 02/20/24 10:11 AM    Specimen: Blood   Result Value Ref Range     proBNP 159.0 0.0 - 900.0 pg/mL   Single High Sensitivity Troponin T    Collection Time: 02/20/24 10:11 AM    Specimen: Blood   Result Value Ref Range    HS Troponin T 20 (H) <14 ng/L   Type & Screen    Collection Time: 02/20/24 10:11 AM    Specimen: Blood   Result Value Ref Range    ABO Type O     RH type Positive     Antibody Screen Negative     T&S Expiration Date 2/23/2024 11:59:59 PM    Magnesium    Collection Time: 02/20/24 10:11 AM    Specimen: Blood   Result Value Ref Range    Magnesium 2.2 1.6 - 2.4 mg/dL   CBC Auto Differential    Collection Time: 02/20/24 10:11 AM    Specimen: Blood   Result Value Ref Range    WBC 5.69 3.40 - 10.80 10*3/mm3    RBC 3.59 (L) 3.77 - 5.28 10*6/mm3    Hemoglobin 4.7 (C) 12.0 - 15.9 g/dL    Hematocrit 20.4 (C) 34.0 - 46.6 %    MCV 56.8 (L) 79.0 - 97.0 fL    MCH 13.1 (L) 26.6 - 33.0 pg    MCHC 23.0 (L) 31.5 - 35.7 g/dL    RDW 19.6 (H) 12.3 - 15.4 %    RDW-SD 37.8 37.0 - 54.0 fl    MPV 8.3 6.0 - 12.0 fL    Platelets 418 140 - 450 10*3/mm3    Neutrophil % 70.0 42.7 - 76.0 %    Lymphocyte % 17.4 (L) 19.6 - 45.3 %    Monocyte % 7.6 5.0 - 12.0 %    Eosinophil % 3.9 0.3 - 6.2 %    Basophil % 0.4 0.0 - 1.5 %    Neutrophils, Absolute 3.99 1.70 - 7.00 10*3/mm3    Lymphocytes, Absolute 0.99 0.70 - 3.10 10*3/mm3    Monocytes, Absolute 0.43 0.10 - 0.90 10*3/mm3    Eosinophils, Absolute 0.22 0.00 - 0.40 10*3/mm3    Basophils, Absolute 0.02 0.00 - 0.20 10*3/mm3   Iron Profile    Collection Time: 02/20/24 10:11 AM    Specimen: Blood   Result Value Ref Range    Iron 16 (L) 37 - 145 mcg/dL    Iron Saturation (TSAT) 3 (L) 20 - 50 %    Transferrin 333 200 - 360 mg/dL    TIBC 496 298 - 536 mcg/dL   Ferritin    Collection Time: 02/20/24 10:11 AM    Specimen: Blood   Result Value Ref Range    Ferritin 3.88 (L) 13.00 - 150.00 ng/mL   Haptoglobin    Collection Time: 02/20/24 10:11 AM    Specimen: Blood   Result Value Ref Range    Haptoglobin 169 30 - 200 mg/dL   Lactate Dehydrogenase    Collection Time:  02/20/24 10:11 AM    Specimen: Blood   Result Value Ref Range     135 - 214 U/L   Reticulocytes    Collection Time: 02/20/24 10:11 AM    Specimen: Blood   Result Value Ref Range    Reticulocyte % 1.28 0.70 - 1.90 %    Reticulocyte Absolute 0.0456 0.0200 - 0.1300 10*6/mm3   ECG 12 Lead Dyspnea    Collection Time: 02/20/24 10:20 AM   Result Value Ref Range    QT Interval 403 ms    QTC Interval 460 ms   Prepare RBC, 3 Units    Collection Time: 02/20/24 11:25 AM   Result Value Ref Range    Product Code F5695G58     Unit Number [id number]     UNIT  ABO O     UNIT  RH POS     Crossmatch Interpretation Compatible     Dispense Status IS     Blood Expiration Date 202403082359     Blood Type Barcode 5100     Product Code Q1215Q90     Unit Number [id number]     UNIT  ABO O     UNIT  RH POS     Crossmatch Interpretation Compatible     Dispense Status XM     Blood Expiration Date 202403082359     Blood Type Barcode 5100     Product Code E6405C65     Unit Number [id number]     UNIT  ABO O     UNIT  RH POS     Crossmatch Interpretation Compatible     Dispense Status XM     Blood Expiration Date 202403082359     Blood Type Barcode 5100        Ordered the above labs and reviewed the results.        RADIOLOGY  XR Chest 1 View    Result Date: 2/20/2024  XR CHEST 1 VW-  INDICATION: Dyspnea and cough  COMPARISON: Chest radiograph 2/19/2024      No focal consolidation. No pleural effusion or pneumothorax. Cardiac silhouette at the upper limits of normal, favored partly projectional. Moderate size hiatal hernia. No focal osseous abnormality.  This report was finalized on 2/20/2024 10:57 AM by Dr. Carlos Houston M.D on Workstation: [id number]      XR Chest 2 View    Result Date: 2/19/2024  PA AND LATERAL CHEST X-RAY  HISTORY: Dyspnea on exertion with cough for 5 days.  Chest x-ray consisting of 2 images is provided. Correlation: None.  FINDINGS: The cardiomediastinal silhouette is normal except for a small hiatal  hernia. The lungs are clear. The costophrenic sulci are dry and the bones appear normal. There is no pneumothorax. Osteoarthritic change is observed at the right shoulder.      Chronic changes as discussed. No acute cardiopulmonary abnormality is evident on x-ray.  This report was finalized on 2/19/2024 2:57 PM by Dr. Hermelindo Mares M.D on Workstation: [id number]       Ordered the above noted radiological studies. Reviewed by me in PACS.                MEDICATIONS GIVEN IN ER  Medications   ondansetron (ZOFRAN) injection 4 mg (has no administration in time range)   nitroglycerin (NITROSTAT) SL tablet 0.4 mg (has no administration in time range)   sennosides-docusate (PERICOLACE) 8.6-50 MG per tablet 2 tablet (has no administration in time range)     And   polyethylene glycol (MIRALAX) packet 17 g (has no administration in time range)     And   bisacodyl (DULCOLAX) EC tablet 5 mg (has no administration in time range)     And   bisacodyl (DULCOLAX) suppository 10 mg (has no administration in time range)               MEDICAL DECISION MAKING, PROGRESS, and CONSULTS    MDM: Patient presented emergency department with dyspnea on exertion, anemia.  Otherwise well-appearing, vitals otherwise stable.  Labs significant for hemoglobin of 4.7.  Started PRBC infusion.  Iron studies showing very low iron levels.  Discussed with inpatient team, will admit for further testing and evaluation.    All labs have been independently reviewed by me.  All radiology studies have been reviewed by me and I have also reviewed the radiology report.   EKG's independently viewed and interpreted by me.  Discussion below represents my analysis of pertinent findings related to patient's condition, differential diagnosis, treatment plan and final disposition.      Additional sources:  - Discussed/ obtained information from independent historians:      - External (non-ED) record review:     - Chronic or social conditions impacting care:     - Shared  decision making: Discussed plan for admission, patient agrees      Orders placed during this visit:  Orders Placed This Encounter   Procedures    XR Chest 1 View    Comprehensive Metabolic Panel    Protime-INR    aPTT    BNP    Single High Sensitivity Troponin T    Magnesium    CBC Auto Differential    Iron Profile    Ferritin    Haptoglobin    Lactate Dehydrogenase    Reticulocytes    Vitamin B12    Folate    Hemoglobin & Hematocrit, Blood    Basic Metabolic Panel    CBC (No Diff)    Diet: Liquid Diets; Clear Liquid; Fluid Consistency: Thin (IDDSI 0)    Verify Informed Consent    Vital Signs    Up with assistance    Intake & Output    Daily Weights    Oral Care    Place Sequential Compression Device    Maintain Sequential Compression Device    Telemetry - Maintain IV Access    Telemetry - Place Orders & Notify Provider of Results When Patient Experiences Acute Chest Pain, Dysrhythmia or Respiratory Distress    May Be Off Telemetry for Tests    Code Status and Medical Interventions:    LHA (on-call MD unless specified) Details    Hematology & Oncology Inpatient Consult    ECG 12 Lead Dyspnea    Type & Screen    Prepare RBC, 3 Units    Inpatient Admission    Inpatient Admission    CBC & Differential         Additional orders considered but not ordered:  Considered Hemoccult stool however given the patient still has melena or dilan blood will defer at this time.        Differential diagnosis includes but is not limited to:    Anemia, iron deficiency, hemolysis, bone marrow failure, aplastic anemia      Independent interpretation of labs, radiology studies, and discussions with consultants:  ED Course as of 02/20/24 1237   Tue Feb 20, 2024   1043 Hemoglobin(!!): 4.7 [FS]   1102 EKG interpreted myself:  1020, sinus rhythm rate of 78, no acute ST segment changes or T wave inversions.  Limited somewhat by artifact. [FS]   1102 Chest x-ray interpreted myself:  No infiltrate or pneumothorax [FS]      ED Course User  Index  [FS] Kaz Alonso MD           DIAGNOSIS  Final diagnoses:   Microcytic anemia   Dyspnea on exertion         DISPOSITION  Admitted to telemetry        Latest Documented Vital Signs:  As of 12:37 EST  BP- 123/63 HR- 78 Temp- 97 °F (36.1 °C) O2 sat- 99%              --    Please note that portions of this were completed with a voice recognition program.       Note Disclaimer: At [facility], we believe that sharing information builds trust and better relationships. You are receiving this note because you are receiving care at [facility] or recently visited. It is possible you will see health information before a provider has talked with you about it. This kind of information can be easy to misunderstand. To help you fully understand what it means for your health, we urge you to discuss this note with your provider.             Kaz Alonso MD  02/20/24 1242      Electronically signed by Kaz Alonso MD at 02/20/24 1242       Satish Rosa RN at 02/20/24 1141          Nursing report ED to floor  Dudley Estrada  64 y.o.  female    HPI :   Chief Complaint   Patient presents with    Abnormal Lab       Admitting doctor:   Royer De La Cruz MD    Admitting diagnosis:   The primary encounter diagnosis was Microcytic anemia. A diagnosis of Dyspnea on exertion was also pertinent to this visit.    Code status:   Current Code Status       Date Active Code Status Order ID Comments User Context       2/20/2024 1109 CPR (Attempt to Resuscitate) [id number]  Naina Araiza APRN ED        Question Answer    Code Status (Patient has no pulse and is not breathing) CPR (Attempt to Resuscitate)    Medical Interventions (Patient has pulse or is breathing) Full Support                    Allergies:   Patient has no known allergies.    Isolation:   No active isolations    Intake and Output  No intake or output data in the 24 hours ending 02/20/24 1141    Weight:       02/20/24  1057   Weight: 86.6 kg (191 lb)        Most recent vitals:   Vitals:    02/20/24 1138 02/20/24 1139 02/20/24 1140 02/20/24 1141   BP: 125/61   123/63   BP Location: Left arm   Left arm   Patient Position: Sitting   Sitting   Pulse: 78 75 73 78   Resp: 18   22   Temp: 97 °F (36.1 °C)   97 °F (36.1 °C)   TempSrc: Tympanic      SpO2: 100% 100% 99% 99%   Weight:       Height:           Active LDAs/IV Access:   Lines, Drains & Airways       Active LDAs       Name Placement date Placement time Site Days    Peripheral IV 02/20/24 1120 Right Antecubital 02/20/24  1120  Antecubital  less than 1                    Labs (abnormal labs have a star):   Labs Reviewed   COMPREHENSIVE METABOLIC PANEL - Abnormal; Notable for the following components:       Result Value    Glucose 163 (*)     Calcium 8.2 (*)     Alkaline Phosphatase 143 (*)     All other components within normal limits    Narrative:     GFR Normal >60  Chronic Kidney Disease <60  Kidney Failure <15     PROTIME-INR - Abnormal; Notable for the following components:    Protime 15.1 (*)     INR 1.18 (*)     All other components within normal limits   SINGLE HSTROPONIN T - Abnormal; Notable for the following components:    HS Troponin T 20 (*)     All other components within normal limits    Narrative:     High Sensitive Troponin T Reference Range:  <14.0 ng/L- Negative Female for AMI  <22.0 ng/L- Negative Male for AMI  >=14 - Abnormal Female indicating possible myocardial injury.  >=22 - Abnormal Male indicating possible myocardial injury.   Clinicians would have to utilize clinical acumen, EKG, Troponin, and serial changes to determine if it is an Acute Myocardial Infarction or myocardial injury due to an underlying chronic condition.        CBC WITH AUTO DIFFERENTIAL - Abnormal; Notable for the following components:    RBC 3.59 (*)     Hemoglobin 4.7 (*)     Hematocrit 20.4 (*)     MCV 56.8 (*)     MCH 13.1 (*)     MCHC 23.0 (*)     RDW 19.6 (*)     Lymphocyte % 17.4 (*)     All other components  within normal limits   IRON PROFILE - Abnormal; Notable for the following components:    Iron 16 (*)     Iron Saturation (TSAT) 3 (*)     All other components within normal limits   FERRITIN - Abnormal; Notable for the following components:    Ferritin 3.88 (*)     All other components within normal limits    Narrative:     Results may be falsely decreased if patient taking Biotin.     APTT - Normal   BNP (IN-HOUSE) - Normal    Narrative:     This assay is used as an aid in the diagnosis of individuals suspected of having heart failure. It can be used as an aid in the diagnosis of acute decompensated heart failure (ADHF) in patients presenting with signs and symptoms of ADHF to the emergency department (ED). In addition, NT-proBNP of <300 pg/mL indicates ADHF is not likely.    Age Range Result Interpretation  NT-proBNP Concentration (pg/mL:      <50             Positive            >450                   Gray                 300-450                    Negative             <300    50-75           Positive            >900                  Gray                300-900                  Negative            <300      >75             Positive            >1800                  Gray                300-1800                  Negative            <300   MAGNESIUM - Normal   HAPTOGLOBIN - Normal   LACTATE DEHYDROGENASE - Normal   RETICULOCYTES   HEMOGLOBIN AND HEMATOCRIT, BLOOD   TYPE AND SCREEN   PREPARE RBC   CBC AND DIFFERENTIAL    Narrative:     The following orders were created for panel order CBC & Differential.  Procedure                               Abnormality         Status                     ---------                               -----------         ------                     CBC Auto Differential[[id number]]        Abnormal            Final result                 Please view results for these tests on the individual orders.       EKG:   ECG 12 Lead Dyspnea   Preliminary Result   HEART RATE= 78  bpm   RR Interval= 769   ms   NV Interval= 155  ms   P Horizontal Axis= 1  deg   P Front Axis= 52  deg   QRSD Interval= 89  ms   QT Interval= 403  ms   QTcB= 460  ms   QRS Axis= -19  deg   T Wave Axis= 57  deg   - ABNORMAL ECG -   Sinus rhythm   Probable left atrial enlargement   Borderline left axis deviation   Nonspecific repolarization abnormalities   Electronically Signed By:    Date and Time of Study: 2024-02-20 10:20:27          Meds given in ED:   Medications   ondansetron (ZOFRAN) injection 4 mg (has no administration in time range)   nitroglycerin (NITROSTAT) SL tablet 0.4 mg (has no administration in time range)   sennosides-docusate (PERICOLACE) 8.6-50 MG per tablet 2 tablet (has no administration in time range)     And   polyethylene glycol (MIRALAX) packet 17 g (has no administration in time range)     And   bisacodyl (DULCOLAX) EC tablet 5 mg (has no administration in time range)     And   bisacodyl (DULCOLAX) suppository 10 mg (has no administration in time range)       Imaging results:  XR Chest 1 View    Result Date: 2/20/2024  No focal consolidation. No pleural effusion or pneumothorax. Cardiac silhouette at the upper limits of normal, favored partly projectional. Moderate size hiatal hernia. No focal osseous abnormality.  This report was finalized on 2/20/2024 10:57 AM by Dr. Carlos Houston M.D on Workstation: [id number]      XR Chest 2 View    Result Date: 2/19/2024  Chronic changes as discussed. No acute cardiopulmonary abnormality is evident on x-ray.  This report was finalized on 2/19/2024 2:57 PM by Dr. Hermelindo Mares M.D on Workstation: [id number]       Ambulatory status:   - up with assistance    Social issues:   Social History     Socioeconomic History    Marital status:    Tobacco Use    Smoking status: Never    Smokeless tobacco: Never   Vaping Use    Vaping Use: Never used   Substance and Sexual Activity    Alcohol use: Yes     Alcohol/week: 3.0 standard drinks of alcohol     Types: 1 Glasses of wine, 1  Cans of beer, 1 Shots of liquor per week     Comment: occ    Drug use: Never    Sexual activity: Yes     Partners: Male     Birth control/protection: Post-menopausal       NIH Stroke Scale:         Satish Au RN  02/20/24 11:41 EST         Electronically signed by Satish Rosa RN at 02/20/24 1142       Marilyn Brock, RN at 02/20/24 0944          Patient to ER via car from home went to PCP yesterday for physical told that her hem was 5.0 PCP did repeat this morning it was 4.9     Patient reports SOA x 1 month    Electronically signed by Marilyn Brock RN at 02/20/24 0945       Vital Signs (last day)       Date/Time Temp Temp src Pulse Resp BP Patient Position SpO2    02/21/24 1136 -- -- 67 18 171/85 Lying 97    02/21/24 0917 98.1 (36.7) Oral 73 20 160/80 Lying 96    02/21/24 0915 -- -- 82 -- -- -- --    02/21/24 0300 99.1 (37.3) Oral -- 24 145/76 -- --    02/21/24 0000 -- -- -- -- 156/73 Lying --    02/20/24 2326 99.3 (37.4) Oral 90 24 160/69 Lying 95    02/20/24 2015 99 (37.2) Oral 84 17 160/69 -- 96    02/20/24 1951 98.9 (37.2) Oral -- 18 149/69 Sitting --    02/20/24 1758 99.6 (37.6) Oral 80 -- 144/73 -- 97    02/20/24 1657 98.9 (37.2) Axillary 77 16 149/74 Lying 96    02/20/24 1412 97.8 (36.6) Oral -- 16 144/68 -- --    02/20/24 1337 98.7 (37.1) Oral 72 -- 135/68 Lying 97    02/20/24 1314 -- -- 72 16 131/70 -- 97    02/20/24 11:41:16 97 (36.1) -- 78 22 123/63 Sitting 99    02/20/24 1140 -- -- 73 -- -- -- 99    02/20/24 1139 -- -- 75 -- -- -- 100    02/20/24 11:38:29 97 (36.1) Tympanic 78 18 125/61 Sitting 100    02/20/24 1138 -- -- 75 -- 125/61 -- 99 02/20/24 1117 97 (36.1) Tympanic 78 18 143/63 -- 99    02/20/24 1116 -- -- 78 -- -- -- 99 02/20/24 1115 -- -- 76 -- -- -- 99 02/20/24 1114 -- -- 77 -- -- -- 99 02/20/24 1113 -- -- 80 -- -- -- 97 02/20/24 1112 -- -- 79 -- -- -- 100 02/20/24 1111 -- -- 84 -- -- -- --    02/20/24 1110 -- -- 74 -- -- -- 99 02/20/24 1109 -- -- 78  -- -- -- 99 02/20/24 1108 -- -- 77 -- -- -- 98 02/20/24 1107 -- -- 74 -- -- -- 99 02/20/24 1106 -- -- 76 -- -- -- 99 02/20/24 1104 -- -- 73 -- -- -- 99 02/20/24 1103 -- -- 74 -- -- -- 99 02/20/24 1102 -- -- 78 -- -- -- 99 02/20/24 1101 -- -- 77 -- -- -- 99 02/20/24 1100 -- -- 77 -- -- -- 99 02/20/24 1059 -- -- 74 -- -- -- 99 02/20/24 1058 -- -- 76 -- -- -- 100 02/20/24 1057 -- -- 76 -- -- -- 100 02/20/24 1056 -- -- 76 -- -- -- 100 02/20/24 1055 -- -- 75 -- -- -- 100 02/20/24 1054 -- -- 78 -- -- -- 100 02/20/24 1053 -- -- 77 -- -- -- 100 02/20/24 1052 -- -- 77 -- -- -- --    02/20/24 1051 -- -- 78 -- -- -- 100 02/20/24 1050 -- -- 78 -- -- -- 100 02/20/24 1049 -- -- 76 -- -- -- 99 02/20/24 1048 -- -- 79 -- -- -- 99 02/20/24 1047 -- -- 77 -- -- -- 99 02/20/24 1045 -- -- 75 -- -- -- 99 02/20/24 1044 -- -- 77 -- -- -- 100 02/20/24 1043 -- -- 76 -- -- -- 99 02/20/24 1042 -- -- 75 -- -- -- 96    02/20/24 1041 -- -- 75 -- -- -- 99 02/20/24 1039 -- -- 81 -- -- -- 97    02/20/24 1038 -- -- 78 -- -- -- 99    02/20/24 1037 -- -- 81 -- -- -- 100    02/20/24 1036 -- -- 83 -- -- -- 100    02/20/24 1035 -- -- 80 -- -- -- 100    02/20/24 1034 -- -- 84 -- -- -- 98    02/20/24 1033 -- -- 85 -- -- -- 98    02/20/24 1032 -- -- 80 -- -- -- 100    02/20/24 1031 -- -- 84 -- -- -- 100 02/20/24 1030 -- -- 85 -- -- -- 99    02/20/24 1029 -- -- 80 -- -- -- 100 02/20/24 1028 -- -- 86 -- -- -- 100 02/20/24 1027 -- -- 84 -- -- -- --    02/20/24 1026 -- -- 81 -- -- -- 100 02/20/24 1025 -- -- 86 -- -- -- 100 02/20/24 1024 -- -- 81 -- -- -- 100 02/20/24 1023 -- -- 79 -- -- -- 100 02/20/24 1022 -- -- 82 -- -- -- 100 02/20/24 1020 -- -- 83 -- -- -- 100 02/20/24 1019 -- -- 80 -- -- -- 99    02/20/24 1018 -- -- 85 -- -- -- 99    02/20/24 1017 -- -- 83 -- -- -- 99    02/20/24 1016 -- -- 82 -- -- -- 100    02/20/24 1015 -- -- 80 -- -- -- 99 02/20/24  1013 -- -- 83 -- -- -- 100    02/20/24 1012 -- -- 82 -- -- -- 99    02/20/24 1011 -- -- 82 -- -- -- 100    02/20/24 1010 -- -- 84 -- -- -- 100    02/20/24 1009 -- -- 80 -- -- -- 99    02/20/24 1008 -- -- 128 -- -- -- 100    02/20/24 1007 -- -- 87 -- -- -- --    02/20/24 0946 -- -- -- -- 143/63 Sitting --    02/20/24 0945 96.9 (36.1) -- 118 20 -- -- 100          Oxygen Therapy (last day)       Date/Time SpO2 Device (Oxygen Therapy) Flow (L/min) Oxygen Concentration (%) ETCO2 (mmHg)    02/21/24 1136 97 room air -- -- --    02/21/24 0917 96 room air -- -- --    02/20/24 2326 95 room air -- -- --    02/20/24 2015 96 room air -- -- --    02/20/24 1758 97 -- -- -- --    02/20/24 1657 96 -- -- -- --    02/20/24 1400 -- room air -- -- --    02/20/24 1337 97 room air -- -- --    02/20/24 1314 97 room air -- -- --    02/20/24 11:41:16 99 room air -- -- --    02/20/24 1140 99 -- -- -- --    02/20/24 1139 100 -- -- -- --    02/20/24 11:38:29 100 -- -- -- --    02/20/24 1138 99 -- -- -- --    02/20/24 1117 99 room air -- -- --    02/20/24 1116 99 -- -- -- --    02/20/24 1115 99 -- -- -- --    02/20/24 1114 99 -- -- -- --    02/20/24 1113 97 -- -- -- --    02/20/24 1112 100 -- -- -- --    02/20/24 1110 99 -- -- -- --    02/20/24 1109 99 -- -- -- --    02/20/24 1108 98 -- -- -- --    02/20/24 1107 99 -- -- -- --    02/20/24 1106 99 -- -- -- --    02/20/24 1104 99 -- -- -- --    02/20/24 1103 99 -- -- -- --    02/20/24 1102 99 -- -- -- --    02/20/24 1101 99 -- -- -- --    02/20/24 1100 99 -- -- -- --    02/20/24 1059 99 -- -- -- --    02/20/24 1058 100 -- -- -- --    02/20/24 1057 100 -- -- -- --    02/20/24 1056 100 -- -- -- --    02/20/24 1055 100 -- -- -- --    02/20/24 1054 100 -- -- -- --    02/20/24 1053 100 -- -- -- --    02/20/24 1051 100 -- -- -- --    02/20/24 1050 100 -- -- -- --    02/20/24 1049 99 -- -- -- --    02/20/24 1048 99 -- -- -- --    02/20/24 1047 99 -- -- -- --    02/20/24 1045 99 -- -- -- --    02/20/24  1044 100 -- -- -- --    02/20/24 1043 99 -- -- -- --    02/20/24 1042 96 -- -- -- --    02/20/24 1041 99 -- -- -- --    02/20/24 1039 97 -- -- -- --    02/20/24 1038 99 -- -- -- --    02/20/24 1037 100 -- -- -- --    02/20/24 1036 100 -- -- -- --    02/20/24 1035 100 -- -- -- --    02/20/24 1034 98 -- -- -- --    02/20/24 1033 98 -- -- -- --    02/20/24 1032 100 -- -- -- --    02/20/24 1031 100 -- -- -- --    02/20/24 1030 99 -- -- -- --    02/20/24 1029 100 -- -- -- --    02/20/24 1028 100 -- -- -- --    02/20/24 1026 100 -- -- -- --    02/20/24 1025 100 -- -- -- --    02/20/24 1024 100 -- -- -- --    02/20/24 1023 100 -- -- -- --    02/20/24 1022 100 -- -- -- --    02/20/24 1020 100 -- -- -- --    02/20/24 1019 99 -- -- -- --    02/20/24 1018 99 -- -- -- --    02/20/24 1017 99 -- -- -- --    02/20/24 1016 100 -- -- -- --    02/20/24 1015 99 -- -- -- --    02/20/24 1013 100 -- -- -- --    02/20/24 1012 99 -- -- -- --    02/20/24 1011 100 -- -- -- --    02/20/24 1010 100 -- -- -- --    02/20/24 1009 99 -- -- -- --    02/20/24 1008 100 -- -- -- --    02/20/24 0945 100 -- -- -- --          Lines, Drains & Airways       Active LDAs       Name Placement date Placement time Site Days    Peripheral IV 02/20/24 1120 Right Antecubital 02/20/24  1120  Antecubital  1              Inactive LDAs       None                  Facility-Administered Medications as of 2/21/2024   Medication Dose Route Frequency Provider Last Rate Last Admin    acetaminophen (TYLENOL) tablet 650 mg  650 mg Oral Q4H PRN Royer De La Cruz MD        benzonatate (TESSALON) capsule 200 mg  200 mg Oral TID PRN Naina Araiza APRN   200 mg at 02/20/24 2037    ferric gluconate (FERRLECIT) 250 MG in sodium chloride 0.9% 250 mL IVPB  250 mg Intravenous Q24H Royer De La Cruz  mL/hr at 02/20/24 2033 250 mg at 02/20/24 2033    losartan (COZAAR) tablet 50 mg  50 mg Oral Daily Naina Araiza APRN   50 mg at 02/21/24 0915    nitroglycerin (NITROSTAT) SL  tablet 0.4 mg  0.4 mg Sublingual Q5 Min PRN Naina Araiza APRN        ondansetron (ZOFRAN) injection 4 mg  4 mg Intravenous Q6H PRN Naina Araiza APRN        polyethylene glycol (MIRALAX) powder 0.5 bottle  0.5 bottle Oral Q12H Flaco Archer MD         Orders (last 24 hrs)        Start     Ordered    02/22/24 0600  Basic Metabolic Panel  Morning Draw         02/21/24 1219    02/22/24 0600  CBC (No Diff)  Morning Draw         02/21/24 1219    02/22/24 0001  NPO Diet NPO Type: Strict NPO  Diet Effective Midnight         02/21/24 0915    02/21/24 1700  polyethylene glycol (MIRALAX) powder 0.5 bottle  Every 12 Hours         02/21/24 0915    02/21/24 1400  Hemoglobin & Hematocrit, Blood  Every 8 Hours,   Status:  Canceled       02/21/24 0652    02/21/24 1400  Hemoglobin & Hematocrit, Blood  Every 8 Hours       02/21/24 1220    02/21/24 1219  Hemoglobin & Hematocrit, Blood  Once,   Status:  Canceled         02/21/24 1219    02/21/24 1219  Transfuse one unit pRBC for Hgb < 7 If Hgb < 6 call MD  Nursing Communication  Once        Comments: Transfuse one unit pRBC for Hgb < 7  If Hgb < 6 call MD    02/21/24 1219    02/21/24 0921  Verify bowel prep was successful  Once         02/21/24 0920    02/21/24 0921  Give tap water enema if bowel prep was insufficient  Once         02/21/24 0920    02/21/24 0921  Obtain Informed Consent  Once         02/21/24 0920    02/21/24 0921  Implement Anesthesia orders day of procedure.  Once         02/21/24 0920    02/21/24 0915  Diet: Liquid Diets; Clear Liquid; Fluid Consistency: Thin (IDDSI 0)  Diet Effective Now         02/21/24 0915    02/21/24 0729  Inpatient Case Management  Consult  Once        Provider:  (Not yet assigned)    02/21/24 0728    02/21/24 0600  Vitamin B12  Morning Draw         02/20/24 1105    02/21/24 0600  Folate  Morning Draw         02/20/24 1105    02/21/24 0600  Basic Metabolic Panel  Morning Draw         02/20/24 1109    02/21/24  0600  CBC (No Diff)  Morning Draw         02/20/24 1109    02/21/24 0500  Diet: Liquid Diets; Clear Liquid; Fluid Consistency: Thin (IDDSI 0)  Diet Effective 0500,   Status:  Canceled         02/20/24 1803    02/21/24 0000  Diet: Liquid Diets; Clear Liquid; Fluid Consistency: Thin (IDDSI 0)  Diet Effective Now,   Status:  Canceled         02/20/24 1754    02/20/24 1800  Oral Care  2 Times Daily       02/20/24 1109    02/20/24 1800  ferric gluconate (FERRLECIT) 250 MG in sodium chloride 0.9% 250 mL IVPB  Every 24 Hours         02/20/24 1625    02/20/24 1626  Change to clear liquid diet in a.m.  Nursing Communication  Once        Comments: Change to clear liquid diet in a.m.    02/20/24 1625    02/20/24 1625  Diet: Regular/House Diet, Gastrointestinal Diets; Fiber-Restricted; Fluid Consistency: Thin (IDDSI 0)  Diet Effective Now,   Status:  Canceled         02/20/24 1625    02/20/24 1623  acetaminophen (TYLENOL) tablet 650 mg  Every 4 Hours PRN         02/20/24 1625    02/20/24 1600  Hemoglobin & Hematocrit, Blood  Every 8 Hours,   Status:  Canceled       02/20/24 1105    02/20/24 1515  losartan (COZAAR) tablet 50 mg  Daily         02/20/24 1424    02/20/24 1424  benzonatate (TESSALON) capsule 200 mg  3 Times Daily PRN         02/20/24 1424    02/20/24 1200  Vital Signs  Every 4 Hours      Comments: Per per hospital policy    02/20/24 1109    02/20/24 1109  Intake & Output  Every Shift       02/20/24 1109    02/20/24 1108  sennosides-docusate (PERICOLACE) 8.6-50 MG per tablet 2 tablet  2 Times Daily PRN,   Status:  Discontinued        See Hyperspace for full Linked Orders Report.    02/20/24 1109    02/20/24 1108  polyethylene glycol (MIRALAX) packet 17 g  Daily PRN,   Status:  Discontinued        See Hyperspace for full Linked Orders Report.    02/20/24 1109    02/20/24 1108  bisacodyl (DULCOLAX) EC tablet 5 mg  Daily PRN,   Status:  Discontinued        See Hyperspace for full Linked Orders Report.    02/20/24 8523     24 1108  bisacodyl (DULCOLAX) suppository 10 mg  Daily PRN,   Status:  Discontinued        See \[facility] [place] [facility]\""pace for full Linked Orders Report.    24 1109    24 1108  nitroglycerin (NITROSTAT) SL tablet 0.4 mg  Every 5 Minutes PRN         24 1109    24 1108  ondansetron (ZOFRAN) injection 4 mg  Every 6 Hours PRN         24 1109    24 1043  Transfuse RBC, 3 Units Infuse Each Unit Over: 2H  Transfusion       24 1043    Unscheduled  Up with assistance  As Needed       24 1109                     Physician Progress Notes (all)        Royer De La Cruz MD at 24 1216              Name: Dudley Estrada ADMIT: 2024   : 1960  PCP: Haydee Sherman APRN    MRN: [id number] LOS: 1 days   AGE/SEX: 64 y.o. female  ROOM: [facility]     Subjective   Subjective   Feels better following blood transfusions.  No reactions.  Tolerating iron as well.      Objective   Objective   Vital Signs  Temp:  [97.8 °F (36.6 °C)-99.6 °F (37.6 °C)] 98.1 °F (36.7 °C)  Heart Rate:  [67-90] 67  Resp:  [16-24] 18  BP: (131-171)/(68-85) 171/85  SpO2:  [95 %-97 %] 97 %  on   ;   Device (Oxygen Therapy): room air  Body mass index is 34.56 kg/m².  Physical Exam  Vitals and nursing note reviewed.   Constitutional:       General: She is not in acute distress.  Cardiovascular:      Rate and Rhythm: Normal rate and regular rhythm.   Pulmonary:      Effort: Pulmonary effort is normal.      Breath sounds: Normal breath sounds.   Abdominal:      General: Bowel sounds are normal.      Palpations: Abdomen is soft.      Tenderness: There is no abdominal tenderness.   Musculoskeletal:         General: No swelling.   Skin:     General: Skin is warm and dry.      Coloration: Skin is pale.   Neurological:      Mental Status: She is alert. Mental status is at baseline.       Results Review     I reviewed the patient's new clinical results.  Results from last 7 days   Lab Units 24  0657 24  0039  02/20/24  1011 02/20/24  0830 02/19/24  1218   WBC 10*3/mm3 6.79  --  5.69 6.38 5.6   HEMOGLOBIN g/dL 7.2* 7.0* 4.7* 4.9* 5.0*   PLATELETS 10*3/mm3 371  --  418 516* 454*     Results from last 7 days   Lab Units 02/21/24  0657 02/20/24  1011 02/15/24  0934   SODIUM mmol/L 142 139 143   POTASSIUM mmol/L 3.9 3.6 4.4   CHLORIDE mmol/L 106 103 104   CO2 mmol/L 27.0 22.9 21   BUN mg/dL 8 8 9   CREATININE mg/dL 0.64 0.69 0.73   GLUCOSE mg/dL 86 163* 78   EGFR mL/min/1.73 98.8 97.1  --      Results from last 7 days   Lab Units 02/20/24  1011 02/15/24  0934   ALBUMIN g/dL 3.8 4.0   BILIRUBIN mg/dL 0.2 0.3   ALK PHOS U/L 143* 134*   AST (SGOT) U/L 15 14   ALT (SGPT) U/L 10 10     Results from last 7 days   Lab Units 02/21/24  0657 02/20/24  1011 02/15/24  0934   CALCIUM mg/dL 8.2* 8.2* 8.4*   ALBUMIN g/dL  --  3.8 4.0   MAGNESIUM mg/dL  --  2.2  --        Results from last 7 days   Lab Units 02/21/24  0657 02/20/24  1011   IRON mcg/dL  --  16*   IRON SATURATION (TSAT) %  --  3*   TRANSFERRIN mg/dL  --  333   TIBC mcg/dL  --  496   FERRITIN ng/mL  --  3.88*   VITAMIN B 12 pg/mL 581  --    FOLATE ng/mL 18.30  --    RETIC % %  --  1.28       I have personally reviewed all medications:  Scheduled Medications  ferric gluconate, 250 mg, Intravenous, Q24H  losartan, 50 mg, Oral, Daily  polyethylene glycol, 0.5 bottle, Oral, Q12H    Infusions   Diet  Diet: Liquid Diets; Clear Liquid; Fluid Consistency: Thin (IDDSI 0)  NPO Diet NPO Type: Strict NPO    I have personally reviewed:  [x]  Laboratory   []  Microbiology   []  Radiology   [x]  EKG/Telemetry  []  Cardiology/Vascular   []  Pathology    []  Records      Assessment/Plan     Active Hospital Problems    Diagnosis  POA    **Symptomatic anemia [D64.9]  Yes    Iron deficiency anemia due to chronic blood loss [D50.0]  Yes    Diverticulosis [K57.90]  Yes    Obesity (BMI 30-39.9) [E66.9]  Yes    Essential hypertension [I10]  Yes      Resolved Hospital Problems   No resolved problems to  display.       64 y.o. female admitted with Symptomatic anemia.    Hemoglobin improved following transfusion.  Continue monitoring and transfuse for hemoglobin less than 7.  Should get second dose IV iron today.  Hematology consult pending.  Plan for bidirectional endoscopy tomorrow.       SCDs for DVT prophylaxis.  Full code.  Discussed with patient.  Anticipate discharge home with family timing yet to be determined.      Royer De La Cruz MD  [facility]  02/21/24  12:16 EST      Electronically signed by Royer De La Cruz MD at 02/21/24 1217       Flaco Archer MD at 02/21/24 0710          Gastroenterology   Inpatient Progress Note    Reason for Follow Up:  anemia    Subjective     Interval History:   Fe def anemia  Hgb 7.0 after transfusion   Fe 3% total iron 16  Has received 3 u prbc and one iron infusion  No previous egd  COLONOSCOPY (12/30/2021 10:10)       Current Facility-Administered Medications:     acetaminophen (TYLENOL) tablet 650 mg, 650 mg, Oral, Q4H PRN, Royer De La Cruz MD    benzonatate (TESSALON) capsule 200 mg, 200 mg, Oral, TID PRN, Naina Araiza APRN, 200 mg at 02/20/24 2037    sennosides-docusate (PERICOLACE) 8.6-50 MG per tablet 2 tablet, 2 tablet, Oral, BID PRN **AND** polyethylene glycol (MIRALAX) packet 17 g, 17 g, Oral, Daily PRN **AND** bisacodyl (DULCOLAX) EC tablet 5 mg, 5 mg, Oral, Daily PRN **AND** bisacodyl (DULCOLAX) suppository 10 mg, 10 mg, Rectal, Daily PRN, Naina Araiza, APRN    ferric gluconate (FERRLECIT) 250 MG in sodium chloride 0.9% 250 mL IVPB, 250 mg, Intravenous, Q24H, Royer De La Cruz MD, Last Rate: 125 mL/hr at 02/20/24 2033, 250 mg at 02/20/24 2033    losartan (COZAAR) tablet 50 mg, 50 mg, Oral, Daily, Naina Araiza APRN, 50 mg at 02/20/24 1740    nitroglycerin (NITROSTAT) SL tablet 0.4 mg, 0.4 mg, Sublingual, Q5 Min PRN, Naina Araiza, APRN    ondansetron (ZOFRAN) injection 4 mg, 4 mg, Intravenous, Q6H PRN, Naina Araiza,  "APRN  Review of Systems:               The following systems were reviewed and negative;  gastrointestinal    Objective     Vital Signs  Temp:  [96.9 °F (36.1 °C)-99.6 °F (37.6 °C)] 99.1 °F (37.3 °C)  Heart Rate:  [] 90  Resp:  [16-24] 24  BP: (123-160)/(61-76) 145/76  Body mass index is 34.56 kg/m².    Intake/Output Summary (Last 24 hours) at 2/21/2024 0710  Last data filed at 2/20/2024 2015  Gross per 24 hour   Intake 1697.92 ml   Output --   Net 1697.92 ml     No intake/output data recorded.                Physical Exam:              General: patient awake, alert and cooperative              Eyes: no scleral icterus              Skin: warm and dry, not jaundiced              Abdomen: soft, nontender, nondistended; normal bowel sounds, no masses palpated, no periumbical lymphadenopathy              Psychiatric: Appropriate affect and behavior                Results Review:                I reviewed the patient's new clinical results.    Results from last 7 days   Lab Units 02/21/24  0039 02/20/24  1011 02/20/24  0830 02/19/24  1218   WBC 10*3/mm3  --  5.69 6.38 5.6   HEMOGLOBIN g/dL 7.0* 4.7* 4.9* 5.0*   HEMATOCRIT % 26.5* 20.4* 21.1* 21.5*   PLATELETS 10*3/mm3  --  418 516* 454*     Results from last 7 days   Lab Units 02/20/24  1011 02/15/24  0934   SODIUM mmol/L 139 143   POTASSIUM mmol/L 3.6 4.4   CHLORIDE mmol/L 103 104   CO2 mmol/L 22.9 21   BUN mg/dL 8 9   CREATININE mg/dL 0.69 0.73   CALCIUM mg/dL 8.2* 8.4*   BILIRUBIN mg/dL 0.2 0.3   ALK PHOS U/L 143* 134*   ALT (SGPT) U/L 10 10   AST (SGOT) U/L 15 14   GLUCOSE mg/dL 163* 78     Results from last 7 days   Lab Units 02/20/24  1011   INR  1.18*     No results found for: \"LIPASE\"    Radiology:  XR Chest 1 View   Final Result   No focal consolidation. No pleural effusion or pneumothorax.   Cardiac silhouette at the upper limits of normal, favored partly   projectional. Moderate size hiatal hernia. No focal osseous abnormality.       This report was " finalized on 2/20/2024 10:57 AM by Dr. Carlos Houston M.D on Workstation: [id number]              Assessment & Plan     Active Hospital Problems    Diagnosis     **Symptomatic anemia     Iron deficiency anemia due to chronic blood loss     Diverticulosis     Obesity (BMI 30-39.9)     Essential hypertension        Assessment:  Fe def anemia  S/p transfusion with modest increase in hgb  COLONOSCOPY (12/30/2021 10:10) diverticulosis       Plan:  Bidirectional endoscopy planned  Pt is not icu status  Trend h/h  Complete iron infusions as planned  I discussed the patients findings and my recommendations with patient and nursing staff.             Flaco Archer M.D.  [facility] [place]  [address] [place] [address]  Office: [phone]     Electronically signed by Flaco Archer MD at 02/21/24 0919          Consult Notes (all)        Wilson Spaulding MD at 02/20/24 1531        Consult Orders    1. Inpatient Gastroenterology Consult [[id number]] ordered by Naina Araiza APRN at 02/20/24 1350                 [facility]  Initial Inpatient Consult Note    Referring Provider: Naina Araiza    Reason for Consultation: Anemia    Subjective     History of present illness:    64 y.o. female presents to the ER complaining of dyspnea.  She is found to have a hemoglobin of 4.9 confirmed on repeat.  She has evidence of iron deficiency by labs.  No evidence of overt GI bleeding.  Prior colonoscopy 2021 with Dr. Vicente relatively unremarkable.    Past Medical History:  Past Medical History:   Diagnosis Date    Abnormal Pap smear of cervix     years ago     Ankle sprain 1985, 1991    Knee swelling 11/12/2021     Past Surgical History:  Past Surgical History:   Procedure Laterality Date    COLONOSCOPY N/A 12/30/2021    Procedure: COLONOSCOPY;  Surgeon: Bishnu Alvarez MD;  Location[place] [facility];  Service: Gastroenterology;  Laterality: N/A;   hemorrhoids, diverticulosis,    WISDOM TOOTH EXTRACTION  1987    bottom      Social History:   Social History     Tobacco Use    Smoking status: Never    Smokeless tobacco: Never   Substance Use Topics    Alcohol use: Yes     Alcohol/week: 3.0 standard drinks of alcohol     Types: 1 Glasses of wine, 1 Cans of beer, 1 Shots of liquor per week     Comment: occ      Family History:  Family History   Problem Relation Age of Onset    Hypertension Mother     Diabetes Mother          ; stroke complications    Diverticulitis Mother     Stroke Mother     Heart disease Father     Hearing loss Father          2017    Gallbladder disease Sister     Diverticulitis Sister     Breast cancer Neg Hx     Ovarian cancer Neg Hx     Uterine cancer Neg Hx     Colon cancer Neg Hx        Home Meds:  Medications Prior to Admission   Medication Sig Dispense Refill Last Dose    benzonatate (Tessalon Perles) 100 MG capsule Take 2 capsules by mouth 3 (Three) Times a Day As Needed for Cough. 30 capsule 0     losartan (COZAAR) 50 MG tablet TAKE 1 TABLET BY MOUTH EVERY DAY (Patient taking differently: Take 1 tablet by mouth Daily.) 90 tablet 1     Multiple Vitamins-Minerals (ICAPS AREDS 2 PO) Take 1 tablet by mouth 2 (Two) Times a Day.       Multiple Vitamins-Minerals (MULTI-VITAMIN GUMMIES PO) Take 1 tablet by mouth Daily.        Current Meds:   losartan, 50 mg, Oral, Daily      Allergies:  No Known Allergies    Objective     Vital Signs  Temp:  [96.9 °F (36.1 °C)-98.7 °F (37.1 °C)] 97.8 °F (36.6 °C)  Heart Rate:  [] 72  Resp:  [16-22] 16  BP: (123-144)/(61-70) 144/68  Physical Exam:  General Appearance:     Alert, cooperative, in no acute distress   Abdomen:     Normal bowel sounds, no masses, no organomegaly, soft     nontender, nondistended, no guarding, no rebound                 tenderness   Rectal:     Deferred       Results Review:   I reviewed the patient's new clinical results.  Prior GI endoscopy  "records    Results from last 7 days   Lab Units 02/20/24  1011 02/20/24  0830   WBC 10*3/mm3 5.69 6.38   HEMOGLOBIN g/dL 4.7* 4.9*   HEMATOCRIT % 20.4* 21.1*   PLATELETS 10*3/mm3 418 516*     Results from last 7 days   Lab Units 02/20/24  1011 02/15/24  0934   SODIUM mmol/L 139 143   POTASSIUM mmol/L 3.6 4.4   CHLORIDE mmol/L 103 104   CO2 mmol/L 22.9 21   BUN mg/dL 8 9   CREATININE mg/dL 0.69 0.73   CALCIUM mg/dL 8.2* 8.4*   BILIRUBIN mg/dL 0.2 0.3   ALK PHOS U/L 143* 134*   ALT (SGPT) U/L 10 10   AST (SGOT) U/L 15 14   GLUCOSE mg/dL 163* 78     Results from last 7 days   Lab Units 02/20/24  1011   INR  1.18*     No results found for: \"LIPASE\"    Radiology:  XR Chest 1 View   Final Result   No focal consolidation. No pleural effusion or pneumothorax.   Cardiac silhouette at the upper limits of normal, favored partly   projectional. Moderate size hiatal hernia. No focal osseous abnormality.       This report was finalized on 2/20/2024 10:57 AM by Dr. Carlos Houston M.D on Workstation: [id number]              Assessment & Plan   Assessment:    Symptomatic anemia   Iron deficiency    Plan:   Plans for 3U PRBCs, recheck H/H after transfusion  Will plan to prep tomorrow for EGD/colonoscopy on Thursday  Ok for low residue diet today, then CLD starting tomorrow AM    I discussed the patients findings and my recommendations with patient.         Wilson Spaulding M.D.  [facility]  [address] [place] [address] [address]  [place] [address]  Office: [phone]       Electronically signed by Wilson Spaulding MD at 02/20/24 0624       "

## 2024-02-21 NOTE — PLAN OF CARE
Goal Outcome Evaluation:              Outcome Evaluation: Pt had desaturation (upper 80s) through the night while asleep. Placed on 2L NC. No other concerns overnight.

## 2024-02-21 NOTE — PROGRESS NOTES
Gastroenterology   Inpatient Progress Note    Reason for Follow Up:  anemia    Subjective     Interval History:   Fe def anemia  Hgb 7.0 after transfusion   Fe 3% total iron 16  Has received 3 u prbc and one iron infusion  No previous egd  COLONOSCOPY (12/30/2021 10:10)       Current Facility-Administered Medications:     acetaminophen (TYLENOL) tablet 650 mg, 650 mg, Oral, Q4H PRN, Royer De La Cruz MD    benzonatate (TESSALON) capsule 200 mg, 200 mg, Oral, TID PRN, Naina Araiza APRN, 200 mg at 02/20/24 2037    sennosides-docusate (PERICOLACE) 8.6-50 MG per tablet 2 tablet, 2 tablet, Oral, BID PRN **AND** polyethylene glycol (MIRALAX) packet 17 g, 17 g, Oral, Daily PRN **AND** bisacodyl (DULCOLAX) EC tablet 5 mg, 5 mg, Oral, Daily PRN **AND** bisacodyl (DULCOLAX) suppository 10 mg, 10 mg, Rectal, Daily PRN, Naina Araiza APRN    ferric gluconate (FERRLECIT) 250 MG in sodium chloride 0.9% 250 mL IVPB, 250 mg, Intravenous, Q24H, Royer De La Cruz MD, Last Rate: 125 mL/hr at 02/20/24 2033, 250 mg at 02/20/24 2033    losartan (COZAAR) tablet 50 mg, 50 mg, Oral, Daily, Naina Araiza APRN, 50 mg at 02/20/24 1740    nitroglycerin (NITROSTAT) SL tablet 0.4 mg, 0.4 mg, Sublingual, Q5 Min PRN, Naina Araiza APRN    ondansetron (ZOFRAN) injection 4 mg, 4 mg, Intravenous, Q6H PRN, Naina Araiza APRN  Review of Systems:               The following systems were reviewed and negative;  gastrointestinal    Objective     Vital Signs  Temp:  [96.9 °F (36.1 °C)-99.6 °F (37.6 °C)] 99.1 °F (37.3 °C)  Heart Rate:  [] 90  Resp:  [16-24] 24  BP: (123-160)/(61-76) 145/76  Body mass index is 34.56 kg/m².    Intake/Output Summary (Last 24 hours) at 2/21/2024 0710  Last data filed at 2/20/2024 2015  Gross per 24 hour   Intake 1697.92 ml   Output --   Net 1697.92 ml     No intake/output data recorded.                Physical Exam:              General: patient awake, alert and cooperative              Eyes: no  "scleral icterus              Skin: warm and dry, not jaundiced              Abdomen: soft, nontender, nondistended; normal bowel sounds, no masses palpated, no periumbical lymphadenopathy              Psychiatric: Appropriate affect and behavior                Results Review:                I reviewed the patient's new clinical results.    Results from last 7 days   Lab Units 02/21/24  0039 02/20/24  1011 02/20/24  0830 02/19/24  1218   WBC 10*3/mm3  --  5.69 6.38 5.6   HEMOGLOBIN g/dL 7.0* 4.7* 4.9* 5.0*   HEMATOCRIT % 26.5* 20.4* 21.1* 21.5*   PLATELETS 10*3/mm3  --  418 516* 454*     Results from last 7 days   Lab Units 02/20/24  1011 02/15/24  0934   SODIUM mmol/L 139 143   POTASSIUM mmol/L 3.6 4.4   CHLORIDE mmol/L 103 104   CO2 mmol/L 22.9 21   BUN mg/dL 8 9   CREATININE mg/dL 0.69 0.73   CALCIUM mg/dL 8.2* 8.4*   BILIRUBIN mg/dL 0.2 0.3   ALK PHOS U/L 143* 134*   ALT (SGPT) U/L 10 10   AST (SGOT) U/L 15 14   GLUCOSE mg/dL 163* 78     Results from last 7 days   Lab Units 02/20/24  1011   INR  1.18*     No results found for: \"LIPASE\"    Radiology:  XR Chest 1 View   Final Result   No focal consolidation. No pleural effusion or pneumothorax.   Cardiac silhouette at the upper limits of normal, favored partly   projectional. Moderate size hiatal hernia. No focal osseous abnormality.       This report was finalized on 2/20/2024 10:57 AM by Dr. Carlos Houston M.D on Workstation: [id number]              Assessment & Plan     Active Hospital Problems    Diagnosis     **Symptomatic anemia     Iron deficiency anemia due to chronic blood loss     Diverticulosis     Obesity (BMI 30-39.9)     Essential hypertension        Assessment:  Fe def anemia  S/p transfusion with modest increase in hgb  COLONOSCOPY (12/30/2021 10:10) diverticulosis       Plan:  Bidirectional endoscopy planned  Pt is not icu status  Trend h/h  Complete iron infusions as planned  I discussed the patients findings and my recommendations with patient " and nursing staff.             Flaco Archer M.D.  [facility] [place] [address]  Office: [phone]

## 2024-02-22 ENCOUNTER — READMISSION MANAGEMENT (OUTPATIENT)
Dept: CALL CENTER | Facility: HOSPITAL | Age: 64
End: 2024-02-22
Payer: COMMERCIAL

## 2024-02-22 ENCOUNTER — ANESTHESIA EVENT (OUTPATIENT)
Dept: GASTROENTEROLOGY | Facility: HOSPITAL | Age: 64
End: 2024-02-22
Payer: COMMERCIAL

## 2024-02-22 ENCOUNTER — ANESTHESIA (OUTPATIENT)
Dept: GASTROENTEROLOGY | Facility: HOSPITAL | Age: 64
End: 2024-02-22
Payer: COMMERCIAL

## 2024-02-22 VITALS
DIASTOLIC BLOOD PRESSURE: 64 MMHG | HEART RATE: 66 BPM | OXYGEN SATURATION: 96 % | RESPIRATION RATE: 18 BRPM | BODY MASS INDEX: 33.63 KG/M2 | WEIGHT: 189.82 LBS | HEIGHT: 63 IN | TEMPERATURE: 97.9 F | SYSTOLIC BLOOD PRESSURE: 154 MMHG

## 2024-02-22 PROBLEM — K25.3 ACUTE CAMERON ULCER: Status: ACTIVE | Noted: 2024-02-22

## 2024-02-22 PROBLEM — K44.9 DIAPHRAGMATIC HERNIA WITHOUT OBSTRUCTION OR GANGRENE: Status: ACTIVE | Noted: 2024-02-22

## 2024-02-22 LAB
ANION GAP SERPL CALCULATED.3IONS-SCNC: 10.3 MMOL/L (ref 5–15)
BUN SERPL-MCNC: 4 MG/DL (ref 8–23)
BUN/CREAT SERPL: 7.7 (ref 7–25)
CALCIUM SPEC-SCNC: 8.6 MG/DL (ref 8.6–10.5)
CHLORIDE SERPL-SCNC: 107 MMOL/L (ref 98–107)
CO2 SERPL-SCNC: 23.7 MMOL/L (ref 22–29)
CREAT SERPL-MCNC: 0.52 MG/DL (ref 0.57–1)
DEPRECATED RDW RBC AUTO: 55.6 FL (ref 37–54)
EGFRCR SERPLBLD CKD-EPI 2021: 103.9 ML/MIN/1.73
ERYTHROCYTE [DISTWIDTH] IN BLOOD BY AUTOMATED COUNT: 26.5 % (ref 12.3–15.4)
GLUCOSE SERPL-MCNC: 82 MG/DL (ref 65–99)
HCT VFR BLD AUTO: 28.6 % (ref 34–46.6)
HCT VFR BLD AUTO: 29 % (ref 34–46.6)
HGB BLD-MCNC: 7.4 G/DL (ref 12–15.9)
HGB BLD-MCNC: 7.6 G/DL (ref 12–15.9)
MCH RBC QN AUTO: 16.4 PG (ref 26.6–33)
MCHC RBC AUTO-ENTMCNC: 25.9 G/DL (ref 31.5–35.7)
MCV RBC AUTO: 63.6 FL (ref 79–97)
PLATELET # BLD AUTO: 375 10*3/MM3 (ref 140–450)
PMV BLD AUTO: 8.7 FL (ref 6–12)
POTASSIUM SERPL-SCNC: 3.6 MMOL/L (ref 3.5–5.2)
RBC # BLD AUTO: 4.5 10*6/MM3 (ref 3.77–5.28)
SODIUM SERPL-SCNC: 141 MMOL/L (ref 136–145)
WBC NRBC COR # BLD AUTO: 8.01 10*3/MM3 (ref 3.4–10.8)

## 2024-02-22 PROCEDURE — 25810000003 LACTATED RINGERS PER 1000 ML

## 2024-02-22 PROCEDURE — 25010000002 GLYCOPYRROLATE 0.2 MG/ML SOLUTION

## 2024-02-22 PROCEDURE — 25010000002 NA FERRIC GLUC CPLX PER 12.5 MG: Performed by: HOSPITALIST

## 2024-02-22 PROCEDURE — G0378 HOSPITAL OBSERVATION PER HR: HCPCS

## 2024-02-22 PROCEDURE — 45378 DIAGNOSTIC COLONOSCOPY: CPT | Performed by: INTERNAL MEDICINE

## 2024-02-22 PROCEDURE — 88305 TISSUE EXAM BY PATHOLOGIST: CPT | Performed by: INTERNAL MEDICINE

## 2024-02-22 PROCEDURE — 85027 COMPLETE CBC AUTOMATED: CPT | Performed by: HOSPITALIST

## 2024-02-22 PROCEDURE — 25810000003 SODIUM CHLORIDE 0.9 % SOLUTION: Performed by: INTERNAL MEDICINE

## 2024-02-22 PROCEDURE — 80048 BASIC METABOLIC PNL TOTAL CA: CPT | Performed by: HOSPITALIST

## 2024-02-22 PROCEDURE — 25810000003 SODIUM CHLORIDE 0.9 % SOLUTION: Performed by: HOSPITALIST

## 2024-02-22 PROCEDURE — 25010000002 PROPOFOL 10 MG/ML EMULSION

## 2024-02-22 PROCEDURE — 43239 EGD BIOPSY SINGLE/MULTIPLE: CPT | Performed by: INTERNAL MEDICINE

## 2024-02-22 RX ORDER — PANTOPRAZOLE SODIUM 40 MG/1
40 TABLET, DELAYED RELEASE ORAL
Qty: 60 TABLET | Refills: 0 | Status: SHIPPED | OUTPATIENT
Start: 2024-02-22

## 2024-02-22 RX ORDER — PROPOFOL 10 MG/ML
VIAL (ML) INTRAVENOUS AS NEEDED
Status: DISCONTINUED | OUTPATIENT
Start: 2024-02-22 | End: 2024-02-22 | Stop reason: SURG

## 2024-02-22 RX ORDER — LANOLIN ALCOHOL/MO/W.PET/CERES
325 CREAM (GRAM) TOPICAL
Qty: 30 TABLET | Refills: 0 | Status: SHIPPED | OUTPATIENT
Start: 2024-02-22 | End: 2024-02-26 | Stop reason: SDUPTHER

## 2024-02-22 RX ORDER — SODIUM CHLORIDE 9 MG/ML
30 INJECTION, SOLUTION INTRAVENOUS CONTINUOUS PRN
Status: DISCONTINUED | OUTPATIENT
Start: 2024-02-22 | End: 2024-02-22 | Stop reason: HOSPADM

## 2024-02-22 RX ORDER — PANTOPRAZOLE SODIUM 40 MG/1
40 TABLET, DELAYED RELEASE ORAL
Status: DISCONTINUED | OUTPATIENT
Start: 2024-02-22 | End: 2024-02-22 | Stop reason: HOSPADM

## 2024-02-22 RX ORDER — LIDOCAINE HYDROCHLORIDE 20 MG/ML
INJECTION, SOLUTION INFILTRATION; PERINEURAL AS NEEDED
Status: DISCONTINUED | OUTPATIENT
Start: 2024-02-22 | End: 2024-02-22 | Stop reason: SURG

## 2024-02-22 RX ORDER — SODIUM CHLORIDE, SODIUM LACTATE, POTASSIUM CHLORIDE, CALCIUM CHLORIDE 600; 310; 30; 20 MG/100ML; MG/100ML; MG/100ML; MG/100ML
INJECTION, SOLUTION INTRAVENOUS CONTINUOUS PRN
Status: DISCONTINUED | OUTPATIENT
Start: 2024-02-22 | End: 2024-02-22 | Stop reason: SURG

## 2024-02-22 RX ORDER — GLYCOPYRROLATE 0.2 MG/ML
INJECTION INTRAMUSCULAR; INTRAVENOUS AS NEEDED
Status: DISCONTINUED | OUTPATIENT
Start: 2024-02-22 | End: 2024-02-22 | Stop reason: SURG

## 2024-02-22 RX ADMIN — PANTOPRAZOLE SODIUM 40 MG: 40 TABLET, DELAYED RELEASE ORAL at 12:18

## 2024-02-22 RX ADMIN — POLYETHYLENE GLYCOL 3350 0.5 BOTTLE: 17 POWDER, FOR SOLUTION ORAL at 04:14

## 2024-02-22 RX ADMIN — PROPOFOL 70 MG: 10 INJECTION, EMULSION INTRAVENOUS at 09:10

## 2024-02-22 RX ADMIN — SODIUM CHLORIDE 30 ML/HR: 9 INJECTION, SOLUTION INTRAVENOUS at 08:50

## 2024-02-22 RX ADMIN — SODIUM CHLORIDE 250 MG: 9 INJECTION, SOLUTION INTRAVENOUS at 13:16

## 2024-02-22 RX ADMIN — LOSARTAN POTASSIUM 50 MG: 50 TABLET, FILM COATED ORAL at 10:38

## 2024-02-22 RX ADMIN — BENZONATATE 200 MG: 100 CAPSULE ORAL at 10:39

## 2024-02-22 RX ADMIN — PROPOFOL 200 MCG/KG/MIN: 10 INJECTION, EMULSION INTRAVENOUS at 09:11

## 2024-02-22 RX ADMIN — GLYCOPYRROLATE 0.2 MG: 0.2 INJECTION INTRAMUSCULAR; INTRAVENOUS at 09:06

## 2024-02-22 RX ADMIN — LIDOCAINE HYDROCHLORIDE 80 MG: 20 INJECTION, SOLUTION INFILTRATION; PERINEURAL at 09:10

## 2024-02-22 RX ADMIN — SODIUM CHLORIDE, POTASSIUM CHLORIDE, SODIUM LACTATE AND CALCIUM CHLORIDE: 600; 310; 30; 20 INJECTION, SOLUTION INTRAVENOUS at 09:06

## 2024-02-22 NOTE — PLAN OF CARE
Goal Outcome Evaluation:    No acute events overnight. Pt remains NPO for EGD/Colonoscopy. VSS.

## 2024-02-22 NOTE — PROGRESS NOTES
Patient underwent EGD and colonoscopy today for severe anemia.  The patient once again had a normal colonoscopy other than diverticulosis.  On the upper endoscopy there was evidence of possible Candida and a large hiatal hernia associated with Mark's erosions.  These are erosions that occur at the site of a hiatal hernia.  They certainly can be an explanation for iron deficiency anemia.    1.  Continue PPI would do this orally twice daily  2.  Would continue iron supplementation she is receiving IV iron in the hospital would continue p.o. at discharge if hematology recommends  3.  Trend hemoglobin as an outpatient  4.  Follow-up with Dr. Vicente in the office.  Determine long-term plan based on response to iron and acid medication supplementation  5.  No further recommendations from GI at this time can advance her diet and okay for discharge from our standpoint we will sign off and be available as needed

## 2024-02-22 NOTE — PLAN OF CARE
MARTELL HO prepped for Endo. Consent signed. Cont to have watery yellow stool. Please see nursing assessment    0830 - To Endo    1030 - Returned from Endo. Denies pain. Resting quietly, at bedside.     1200 MD at bedside to discuss Endo results. Pt stated she understands. New orders for DC. Pharm called to see if pt can receive Fe IV earlier.    !300 - IV Fe infustin. Lunch served 50% taken. Doing well    1400 OOB to BR voids well. On room air. Request tesselon pearls for cough.   1500 Cont to rest. Waiting for Inf to be completed    !^00 IV dc. Pt dressed. To Car via w/c. Discharge notes reviewed.

## 2024-02-22 NOTE — ANESTHESIA PREPROCEDURE EVALUATION
Anesthesia Evaluation     Patient summary reviewed and Nursing notes reviewed                Airway   Mallampati: II  TM distance: >3 FB  Neck ROM: full  No difficulty expected  Dental      Pulmonary - negative pulmonary ROS   Cardiovascular     ECG reviewed  Rhythm: regular  Rate: normal    (+) hypertension      Neuro/Psych- negative ROS  GI/Hepatic/Renal/Endo    (+) morbid obesity    Musculoskeletal (-) negative ROS    Abdominal    Substance History   (+) alcohol use     OB/GYN negative ob/gyn ROS         Other                      Anesthesia Plan    ASA 2     MAC     intravenous induction     Anesthetic plan, risks, benefits, and alternatives have been provided, discussed and informed consent has been obtained with: patient.    Plan discussed with CRNA.    CODE STATUS:    Code Status (Patient has no pulse and is not breathing): CPR (Attempt to Resuscitate)  Medical Interventions (Patient has pulse or is breathing): Full Support

## 2024-02-22 NOTE — ANESTHESIA POSTPROCEDURE EVALUATION
Patient: Dudley Estrada    Procedure Summary       Date: 02/22/24 Room / Location: [facility] ENDOSCOPY 1 / [facility] ENDOSCOPY    Anesthesia Start: 0906 Anesthesia Stop: 0949    Procedures:       COLONOSCOPY TO CECUM      ESOPHAGOGASTRODUODENOSCOPY WITH COLD BIOPSIES (Esophagus) Diagnosis:       Anemia      (Anemia [D64.9])    Surgeons: Flaco Archer MD Provider: Julian Hernandez MD    Anesthesia Type: MAC ASA Status: 2            Anesthesia Type: MAC    Vitals  Vitals Value Taken Time   /57 02/22/24 1002   Temp     Pulse 80 02/22/24 1006   Resp 20 02/22/24 0945   SpO2 98 % 02/22/24 1006   Vitals shown include unfiled device data.        Post Anesthesia Care and Evaluation    Patient location during evaluation: PACU  Patient participation: complete - patient participated  Level of consciousness: awake and alert  Pain management: adequate    Airway patency: patent  Anesthetic complications: No anesthetic complications    Cardiovascular status: acceptable  Respiratory status: acceptable  Hydration status: acceptable    Comments: --------------------            02/22/24               0945     --------------------   BP:       102/44     Pulse:      85       Resp:       20       Temp:                SpO2:      95%      --------------------

## 2024-02-22 NOTE — OUTREACH NOTE
Prep Survey      Flowsheet Row Responses   [facility], [place], operated by [facility] patient discharged from? [facility]   Is LACE score < 7 ? Yes   Eligibility [facility]   Date of Admission 02/20/24   Date of Discharge 02/22/24   Discharge Disposition Home or Self Care   Discharge diagnosis Symptomatic anemia   Does the patient have one of the following disease processes/diagnoses(primary or secondary)? Other   Does the patient have Home health ordered? No   Is there a DME ordered? No   Prep survey completed? Yes            Asia STEVENS - Registered Nurse

## 2024-02-22 NOTE — DISCHARGE SUMMARY
Patient Name: Dudley Estrada  : 1960  MRN: [id number]    Date of Admission: 2024  Date of Discharge:  2024  Primary Care Physician: Haydee Sherman APRN      Chief Complaint:   Abnormal Lab      Discharge Diagnoses     Active Hospital Problems    Diagnosis  POA    **Symptomatic anemia [D64.9]  Yes    Diaphragmatic hernia without obstruction or gangrene [K44.9]  Yes    Mark's erosions a/w large hiatal hernia [K25.3]  Yes    Iron deficiency anemia due to chronic blood loss [D50.0]  Yes    Diverticulosis [K57.90]  Yes    Obesity (BMI 30-39.9) [E66.9]  Yes    Essential hypertension [I10]  Yes      Resolved Hospital Problems   No resolved problems to display.        Hospital Course     Ms. Estrada is a 64-year-old female who is for the most part healthy came in with weakness, dyspnea on exertion, etc. found to be profoundly anemic.  She was given blood transfusion with improvement in symptoms.  She was found to be very low in iron and was given IV iron.  She was seen by gastroenterology who recommended and later performed EGD and colonoscopy.  Colonoscopy showed primarily just diverticulosis but EGD showed Mark erosions associated with hiatal hernia.  Recommendation was for Protonix twice daily.  Will continue iron at discharge as well.  Hemoglobin has been stable but explained to patient it may take several weeks for her hemoglobin to hopefully normalize.  She should follow-up with her PCP regarding this.  Will prescribe oral iron at discharge daily.  Discussed with her potential side effects of taking iron and told her if taking it every other day was better tolerated than that was acceptable.  Advised that she limit NSAID use is much as possible.  Recommended Tylenol use instead.    Plan discharge today on Protonix and iron with follow-up PCP.       Day of Discharge     Subjective:  No new complaints.    Physical Exam:  Temp:  [97.4 °F (36.3 °C)-98.2 °F (36.8 °C)] 97.4 °F (36.3  °C)  Heart Rate:  [55-85] 76  Resp:  [16-24] 20  BP: (102-165)/(44-81) 111/57  Body mass index is 33.62 kg/m².  Physical Exam  Vitals and nursing note reviewed.   Constitutional:       Appearance: Normal appearance.   Pulmonary:      Effort: Pulmonary effort is normal.   Skin:     Coloration: Skin is pale.   Neurological:      Mental Status: She is alert. Mental status is at baseline.   Psychiatric:         Mood and Affect: Mood normal.         Consultants     Consult Orders (all) (From admission, onward)       Start     Ordered    02/21/24 0729  Inpatient Case Management  Consult  Once        Provider:  (Not yet assigned)    02/21/24 0728    02/20/24 1351  Inpatient Gastroenterology Consult  Once        Specialty:  Gastroenterology  Provider:  Wilson Spaulding MD    02/20/24 1350    02/20/24 1109  Hematology & Oncology Inpatient Consult  Once        Specialty:  Hematology and Oncology  Provider:  (Not yet assigned)    02/20/24 1109             Procedures     COLONOSCOPY TO CECUM, ESOPHAGOGASTRODUODENOSCOPY WITH COLD BIOPSIES    Imaging Results (All)       Procedure Component Value Units Date/Time    XR Chest 1 View [[id number]] Collected: 02/20/24 1054     Updated: 02/20/24 1101    Narrative:      XR CHEST 1 VW-     INDICATION: Dyspnea and cough     COMPARISON: Chest radiograph 2/19/2024       Impression:      No focal consolidation. No pleural effusion or pneumothorax.  Cardiac silhouette at the upper limits of normal, favored partly  projectional. Moderate size hiatal hernia. No focal osseous abnormality.     This report was finalized on 2/20/2024 10:57 AM by Dr. Carlos Houston M.D on Workstation: [id number]        Pertinent Labs     Results from last 7 days   Lab Units 02/22/24  0802 02/21/24  2346 02/21/24  1611 02/21/24  1419 02/21/24  0657 02/21/24  0039 02/20/24  1011 02/20/24  0830   WBC 10*3/mm3 8.01  --   --   --  6.79  --  5.69 6.38   HEMOGLOBIN g/dL 7.4* 7.6* 7.3* 7.2* 7.2*   < >  "4.7* 4.9*   PLATELETS 10*3/mm3 375  --   --   --  371  --  418 516*    < > = values in this interval not displayed.     Results from last 7 days   Lab Units 02/22/24  0802 02/21/24  0657 02/20/24  1011   SODIUM mmol/L 141 142 139   POTASSIUM mmol/L 3.6 3.9 3.6   CHLORIDE mmol/L 107 106 103   CO2 mmol/L 23.7 27.0 22.9   BUN mg/dL 4* 8 8   CREATININE mg/dL 0.52* 0.64 0.69   GLUCOSE mg/dL 82 86 163*   EGFR mL/min/1.73 103.9 98.8 97.1     Results from last 7 days   Lab Units 02/20/24  1011   ALBUMIN g/dL 3.8   BILIRUBIN mg/dL 0.2   ALK PHOS U/L 143*   AST (SGOT) U/L 15   ALT (SGPT) U/L 10     Results from last 7 days   Lab Units 02/22/24  0802 02/21/24  0657 02/20/24  1011   CALCIUM mg/dL 8.6 8.2* 8.2*   ALBUMIN g/dL  --   --  3.8   MAGNESIUM mg/dL  --   --  2.2       Results from last 7 days   Lab Units 02/20/24  1011   HSTROP T ng/L 20*   PROBNP pg/mL 159.0           Invalid input(s): \"LDLCALC\"          Test Results Pending at Discharge     Pending Labs       Order Current Status    Tissue Pathology Exam In process            Discharge Details        Discharge Medications        New Medications        Instructions Start Date   ferrous sulfate 325 (65 FE) MG EC tablet   325 mg, Oral, Daily With Breakfast      pantoprazole 40 MG EC tablet  Commonly known as: PROTONIX   40 mg, Oral, 2 Times Daily Before Meals             Continue These Medications        Instructions Start Date   benzonatate 100 MG capsule  Commonly known as: Tessalon Perles   200 mg, Oral, 3 Times Daily PRN      losartan 50 MG tablet  Commonly known as: COZAAR   TAKE 1 TABLET BY MOUTH EVERY DAY      MULTI-VITAMIN GUMMIES PO   1 tablet, Oral, Daily      ICAPS AREDS 2 PO   1 tablet, Oral, 2 Times Daily               No Known Allergies    Discharge Disposition:  Home or Self Care      Discharge Diet:  Diet Order   Procedures    Diet: Regular/House Diet; Texture: Regular Texture (IDDSI 7); Fluid Consistency: Thin (IDDSI 0)       Discharge Activity: "   Activity Instructions       Activity as Tolerated              CODE STATUS:    Code Status and Medical Interventions:   Ordered at: 02/20/24 1109     Code Status (Patient has no pulse and is not breathing):    CPR (Attempt to Resuscitate)     Medical Interventions (Patient has pulse or is breathing):    Full Support       Future Appointments   Date Time Provider Department Center   8/22/2024 10:30 AM Haydee Sherman APRN [facility]  [facility]     Additional Instructions for the Follow-ups that You Need to Schedule       Discharge Follow-up with PCP   As directed       Currently Documented PCP:    Haydee Sherman APRN    PCP Phone Number:    [phone]     Follow Up Details: 1 to 2 weeks (or sooner if problems)               Follow-up Information       Haydee Sherman APRN .    Specialty: Family Medicine  Why: 1 to 2 weeks (or sooner if problems)  Contact information:  [address] [place] [address]  [phone]                             Additional Instructions for the Follow-ups that You Need to Schedule       Discharge Follow-up with PCP   As directed       Currently Documented PCP:    Haydee Sherman APRN    PCP Phone Number:    [phone]     Follow Up Details: 1 to 2 weeks (or sooner if problems)            Time Spent on Discharge:  Greater than 30 minutes      Royer De La Cruz MD  [facility]  02/22/24  12:26 EST

## 2024-02-22 NOTE — PLAN OF CARE
Goal Outcome Evaluation:     AAO female on monitor . VS normal. HO. NPO for procedure    0830 Pt to ENDO     1030 - Returned and placed on monitor denies pain. NSR on room air.     1100  at bedside. New orders received.     1200 MD @ bedside. PT  to be discharged to home. Pharm called to see if can give Fe early. Diet tray ordered.     1400 Resting quietly waiting to go home after IV.   \

## 2024-02-23 ENCOUNTER — TRANSITIONAL CARE MANAGEMENT TELEPHONE ENCOUNTER (OUTPATIENT)
Dept: CALL CENTER | Facility: HOSPITAL | Age: 64
End: 2024-02-23
Payer: COMMERCIAL

## 2024-02-23 ENCOUNTER — TELEPHONE (OUTPATIENT)
Dept: INTERNAL MEDICINE | Facility: CLINIC | Age: 64
End: 2024-02-23
Payer: COMMERCIAL

## 2024-02-23 LAB
LAB AP CASE REPORT: NORMAL
PATH REPORT.FINAL DX SPEC: NORMAL
PATH REPORT.GROSS SPEC: NORMAL

## 2024-02-23 NOTE — CASE MANAGEMENT/SOCIAL WORK
Case Management Discharge Note      Final Note: home         Selected Continued Care - Discharged on 2/22/2024 Admission date: 2/20/2024 - Discharge disposition: Home or Self Care      Destination    No services have been selected for the patient.                Durable Medical Equipment    No services have been selected for the patient.                Dialysis/Infusion    No services have been selected for the patient.                Home Medical Care    No services have been selected for the patient.                Therapy    No services have been selected for the patient.                Community Resources    No services have been selected for the patient.                Community & DME    No services have been selected for the patient.                    Transportation Services  Private: Car    Final Discharge Disposition Code: 01 - home or self-care

## 2024-02-23 NOTE — OUTREACH NOTE
Call Center TCM Note      Flowsheet Row Responses   [facility] patient discharged from? [place]   Does the patient have one of the following disease processes/diagnoses(primary or secondary)? Other   TCM attempt successful? Yes   Call start time 1433   Call end time 1438   Discharge diagnosis Symptomatic anemia   Person spoke with today (if not patient) and relationship pt   Meds reviewed with patient/caregiver? Yes   Is the patient having any side effects they believe may be caused by any medication additions or changes? No   Does the patient have all medications ordered at discharge? Yes   Is the patient taking all medications as directed (includes completed medication regime)? Yes   Does the patient have an appointment with their PCP within 7-14 days of discharge? Yes  [2/26/2024 at 2:00 PM]   Psychosocial issues? No   Did the patient receive a copy of their discharge instructions? Yes   Nursing interventions Reviewed instructions with patient   What is the patient's perception of their health status since discharge? Improving   Is the patient/caregiver able to teach back signs and symptoms related to disease process for when to call PCP? Yes   Is the patient/caregiver able to teach back signs and symptoms related to disease process for when to call 911? Yes   Is the patient/caregiver able to teach back the hierarchy of who to call/visit for symptoms/problems? PCP, Specialist, Home health nurse, Urgent Care, ED, 911 Yes   If the patient is a current smoker, are they able to teach back resources for cessation? Not a smoker   TCM call completed? Yes   Wrap up additional comments Pt states she is doing better, and reports swelling at bend of right elbow from post IV insertion site. Pt educated on area for infection, and advised to go to the ER if increased redness, swelling, drainage, fever should occur. Pt verbalized understanding. Reviewed AVS/meds with pt. Pt verified PCP fu appt.   Call end time 1438    Would this patient benefit from a Referral to [facility] Social Work? No   Is the patient interested in additional calls from an ambulatory ? No            Estefania Sevilla RN    2/23/2024, 14:41 EST

## 2024-02-23 NOTE — TELEPHONE ENCOUNTER
Caller: Dudley Estrada    Relationship to patient: Self    Best call back number:    [phone] (Mobile)       New or established patient?  [] New  [x] Established    Date of discharge: 02/22/2024    Facility discharged from: [facility]    Diagnosis/Symptoms: ANEMIA    Length of stay (If applicable): 2 DAYS    Specialty Only: Did you see a [employer] health provider?    [x] Yes  [] No  If so, who?

## 2024-02-25 ENCOUNTER — APPOINTMENT (OUTPATIENT)
Dept: CARDIOLOGY | Facility: HOSPITAL | Age: 64
End: 2024-02-25
Payer: COMMERCIAL

## 2024-02-25 ENCOUNTER — HOSPITAL ENCOUNTER (EMERGENCY)
Facility: HOSPITAL | Age: 64
Discharge: HOME | End: 2024-02-25
Attending: STUDENT IN AN ORGANIZED HEALTH CARE EDUCATION/TRAINING PROGRAM | Admitting: STUDENT IN AN ORGANIZED HEALTH CARE EDUCATION/TRAINING PROGRAM
Payer: COMMERCIAL

## 2024-02-25 VITALS
SYSTOLIC BLOOD PRESSURE: 153 MMHG | OXYGEN SATURATION: 98 % | WEIGHT: 191 LBS | BODY MASS INDEX: 33.84 KG/M2 | HEIGHT: 63 IN | TEMPERATURE: 97.3 F | HEART RATE: 65 BPM | RESPIRATION RATE: 18 BRPM | DIASTOLIC BLOOD PRESSURE: 75 MMHG

## 2024-02-25 DIAGNOSIS — I80.8 SUPERFICIAL THROMBOPHLEBITIS OF BOTH UPPER EXTREMITIES: Primary | ICD-10-CM

## 2024-02-25 DIAGNOSIS — L03.113 CELLULITIS OF RIGHT UPPER EXTREMITY: ICD-10-CM

## 2024-02-25 LAB
ANION GAP SERPL CALCULATED.3IONS-SCNC: 10 MMOL/L (ref 5–15)
ANISOCYTOSIS BLD QL: ABNORMAL
BH CV UPPER VENOUS LEFT INTERNAL JUGULAR AUGMENT: NORMAL
BH CV UPPER VENOUS LEFT INTERNAL JUGULAR COMPRESS: NORMAL
BH CV UPPER VENOUS LEFT INTERNAL JUGULAR PHASIC: NORMAL
BH CV UPPER VENOUS LEFT INTERNAL JUGULAR SPONT: NORMAL
BH CV UPPER VENOUS LEFT SUBCLAVIAN AUGMENT: NORMAL
BH CV UPPER VENOUS LEFT SUBCLAVIAN COMPRESS: NORMAL
BH CV UPPER VENOUS LEFT SUBCLAVIAN PHASIC: NORMAL
BH CV UPPER VENOUS LEFT SUBCLAVIAN SPONT: NORMAL
BH CV UPPER VENOUS RIGHT AXILLARY AUGMENT: NORMAL
BH CV UPPER VENOUS RIGHT AXILLARY COMPRESS: NORMAL
BH CV UPPER VENOUS RIGHT AXILLARY PHASIC: NORMAL
BH CV UPPER VENOUS RIGHT AXILLARY SPONT: NORMAL
BH CV UPPER VENOUS RIGHT BASILIC FOREARM COMPRESS: NORMAL
BH CV UPPER VENOUS RIGHT BASILIC UPPER COMPRESS: NORMAL
BH CV UPPER VENOUS RIGHT BRACHIAL COMPRESS: NORMAL
BH CV UPPER VENOUS RIGHT CEPHALIC FOREARM COLOR: 1
BH CV UPPER VENOUS RIGHT CEPHALIC FOREARM COMPRESS: NORMAL
BH CV UPPER VENOUS RIGHT CEPHALIC FOREARM THROMBUS: NORMAL
BH CV UPPER VENOUS RIGHT CEPHALIC UPPER COLOR: 1
BH CV UPPER VENOUS RIGHT CEPHALIC UPPER COMPRESS: NORMAL
BH CV UPPER VENOUS RIGHT CEPHALIC UPPER THROMBUS: NORMAL
BH CV UPPER VENOUS RIGHT INTERNAL JUGULAR AUGMENT: NORMAL
BH CV UPPER VENOUS RIGHT INTERNAL JUGULAR COMPRESS: NORMAL
BH CV UPPER VENOUS RIGHT INTERNAL JUGULAR PHASIC: NORMAL
BH CV UPPER VENOUS RIGHT INTERNAL JUGULAR SPONT: NORMAL
BH CV UPPER VENOUS RIGHT MED CUBITAL COLOR: 1
BH CV UPPER VENOUS RIGHT MED CUBITAL COMPRESS: NORMAL
BH CV UPPER VENOUS RIGHT MED CUBITAL THROMBUS: NORMAL
BH CV UPPER VENOUS RIGHT RADIAL COMPRESS: NORMAL
BH CV UPPER VENOUS RIGHT SUBCLAVIAN AUGMENT: NORMAL
BH CV UPPER VENOUS RIGHT SUBCLAVIAN COMPRESS: NORMAL
BH CV UPPER VENOUS RIGHT SUBCLAVIAN PHASIC: NORMAL
BH CV UPPER VENOUS RIGHT SUBCLAVIAN SPONT: NORMAL
BH CV UPPER VENOUS RIGHT ULNAR COMPRESS: NORMAL
BH CV VAS PRELIMINARY FINDINGS SCRIPTING: 1
BUN SERPL-MCNC: 6 MG/DL (ref 8–23)
BUN/CREAT SERPL: 10.3 (ref 7–25)
CALCIUM SPEC-SCNC: 8.4 MG/DL (ref 8.6–10.5)
CHLORIDE SERPL-SCNC: 106 MMOL/L (ref 98–107)
CO2 SERPL-SCNC: 26 MMOL/L (ref 22–29)
CREAT SERPL-MCNC: 0.58 MG/DL (ref 0.57–1)
DEPRECATED RDW RBC AUTO: 66.2 FL (ref 37–54)
EGFRCR SERPLBLD CKD-EPI 2021: 101.2 ML/MIN/1.73
EOSINOPHIL # BLD MANUAL: 0.09 10*3/MM3 (ref 0–0.4)
EOSINOPHIL NFR BLD MANUAL: 1 % (ref 0.3–6.2)
ERYTHROCYTE [DISTWIDTH] IN BLOOD BY AUTOMATED COUNT: 30.1 % (ref 12.3–15.4)
GLUCOSE SERPL-MCNC: 97 MG/DL (ref 65–99)
HCT VFR BLD AUTO: 29.2 % (ref 34–46.6)
HGB BLD-MCNC: 7.8 G/DL (ref 12–15.9)
HYPOCHROMIA BLD QL: ABNORMAL
LYMPHOCYTES # BLD MANUAL: 1.58 10*3/MM3 (ref 0.7–3.1)
LYMPHOCYTES NFR BLD MANUAL: 3 % (ref 5–12)
MCH RBC QN AUTO: 17.8 PG (ref 26.6–33)
MCHC RBC AUTO-ENTMCNC: 26.7 G/DL (ref 31.5–35.7)
MCV RBC AUTO: 66.7 FL (ref 79–97)
MICROCYTES BLD QL: ABNORMAL
MONOCYTES # BLD: 0.28 10*3/MM3 (ref 0.1–0.9)
NEUTROPHILS # BLD AUTO: 7.33 10*3/MM3 (ref 1.7–7)
NEUTROPHILS NFR BLD MANUAL: 79 % (ref 42.7–76)
NRBC BLD AUTO-RTO: 0 /100 WBC (ref 0–0.2)
OVALOCYTES BLD QL SMEAR: ABNORMAL
PLAT MORPH BLD: NORMAL
PLATELET # BLD AUTO: 327 10*3/MM3 (ref 140–450)
PMV BLD AUTO: 8.9 FL (ref 6–12)
POLYCHROMASIA BLD QL SMEAR: ABNORMAL
POTASSIUM SERPL-SCNC: 3.7 MMOL/L (ref 3.5–5.2)
RBC # BLD AUTO: 4.38 10*6/MM3 (ref 3.77–5.28)
SODIUM SERPL-SCNC: 142 MMOL/L (ref 136–145)
VARIANT LYMPHS NFR BLD MANUAL: 17 % (ref 19.6–45.3)
WBC MORPH BLD: NORMAL
WBC NRBC COR # BLD AUTO: 9.28 10*3/MM3 (ref 3.4–10.8)

## 2024-02-25 PROCEDURE — 99284 EMERGENCY DEPT VISIT MOD MDM: CPT

## 2024-02-25 PROCEDURE — 80048 BASIC METABOLIC PNL TOTAL CA: CPT | Performed by: STUDENT IN AN ORGANIZED HEALTH CARE EDUCATION/TRAINING PROGRAM

## 2024-02-25 PROCEDURE — 85007 BL SMEAR W/DIFF WBC COUNT: CPT | Performed by: STUDENT IN AN ORGANIZED HEALTH CARE EDUCATION/TRAINING PROGRAM

## 2024-02-25 PROCEDURE — 85025 COMPLETE CBC W/AUTO DIFF WBC: CPT | Performed by: STUDENT IN AN ORGANIZED HEALTH CARE EDUCATION/TRAINING PROGRAM

## 2024-02-25 PROCEDURE — 93971 EXTREMITY STUDY: CPT

## 2024-02-25 PROCEDURE — 36415 COLL VENOUS BLD VENIPUNCTURE: CPT

## 2024-02-25 RX ORDER — DOXYCYCLINE 100 MG/1
100 CAPSULE ORAL 2 TIMES DAILY
Qty: 14 CAPSULE | Refills: 0 | Status: SHIPPED | OUTPATIENT
Start: 2024-02-25

## 2024-02-25 RX ORDER — DOXYCYCLINE 100 MG/1
100 CAPSULE ORAL ONCE
Status: COMPLETED | OUTPATIENT
Start: 2024-02-25 | End: 2024-02-25

## 2024-02-25 RX ADMIN — DOXYCYCLINE 100 MG: 100 CAPSULE ORAL at 10:14

## 2024-02-25 NOTE — ED PROVIDER NOTES
EMERGENCY DEPARTMENT ENCOUNTER    Room Number:  37/37  PCP: Haydee Sherman APRN  History obtained from: Patient      HPI:  Chief Complaint: Bilateral arm pain  A complete HPI/ROS/PMH/PSH/SH/FH are unobtainable due to: N/A  Context: Dudley Estrada is a 64 y.o. female who presents to the ED c/o lateral arm pain.  Ongoing for the last several days.  Started around where she had her IVs in the hospital.  No fever.  No nausea or vomiting.  Does have some redness extending up the right arm now.            PAST MEDICAL HISTORY  Active Ambulatory Problems     Diagnosis Date Noted    Essential hypertension 12/19/2019    Obesity (BMI 30-39.9) 01/18/2021    Calculus of gallbladder without cholecystitis without obstruction 01/22/2023    Symptomatic anemia 02/20/2024    Iron deficiency anemia due to chronic blood loss 02/20/2024    Diverticulosis 02/20/2024    Anemia 02/21/2024    Diaphragmatic hernia without obstruction or gangrene 02/22/2024    Mark's erosions a/w large hiatal hernia 02/22/2024     Resolved Ambulatory Problems     Diagnosis Date Noted    No Resolved Ambulatory Problems     Past Medical History:   Diagnosis Date    Abnormal Pap smear of cervix     Ankle sprain 1985, 1991    Hypertension     Knee swelling 11/12/2021         PAST SURGICAL HISTORY  Past Surgical History:   Procedure Laterality Date    COLONOSCOPY N/A 12/30/2021    Procedure: COLONOSCOPY;  Surgeon: Bishnu Alvarez MD;  Location: [facility] MAIN OR;  Service: Gastroenterology;  Laterality: N/A;  hemorrhoids, diverticulosis,    COLONOSCOPY N/A 2/22/2024    Procedure: COLONOSCOPY TO CECUM;  Surgeon: Flaco Archer MD;  Location: [facility] ENDOSCOPY;  Service: Gastroenterology;  Laterality: N/A;  PRE- ANEMIA  POST-DIVERTICULOSIS, HEMORRHOIDS    ENDOSCOPY N/A 2/22/2024    Procedure: ESOPHAGOGASTRODUODENOSCOPY WITH COLD BIOPSIES;  Surgeon: Flaco Archer MD;  Location: [facility] ENDOSCOPY;  Service: Gastroenterology;  Laterality: N/A;  PRE-  ANEMIA  POST- HIATAL HERNIA, CAMERAN'S EROSIONS, POSSIBLE CANDIDA    WISDOM TOOTH EXTRACTION  1987    bottom         FAMILY HISTORY  Family History   Problem Relation Age of Onset    Hypertension Mother     Diabetes Mother          2015; stroke complications    Diverticulitis Mother     Stroke Mother     Heart disease Father     Hearing loss Father          2017    Gallbladder disease Sister     Diverticulitis Sister     Breast cancer Neg Hx     Ovarian cancer Neg Hx     Uterine cancer Neg Hx     Colon cancer Neg Hx          SOCIAL HISTORY  Social History     Socioeconomic History    Marital status:    Tobacco Use    Smoking status: Never    Smokeless tobacco: Never   Vaping Use    Vaping Use: Never used   Substance and Sexual Activity    Alcohol use: Yes     Alcohol/week: 3.0 standard drinks of alcohol     Types: 1 Glasses of wine, 1 Cans of beer, 1 Shots of liquor per week     Comment: occ    Drug use: Never    Sexual activity: Yes     Partners: Male     Birth control/protection: Post-menopausal         ALLERGIES  Patient has no known allergies.        REVIEW OF SYSTEMS    As per HPI      PHYSICAL EXAM  ED Triage Vitals   Temp Heart Rate Resp BP SpO2   24 0842 24 0842 24 0842 24 0847 24 0842   97.3 °F (36.3 °C) 91 18 172/80 99 %      Temp src Heart Rate Source Patient Position BP Location FiO2 (%)   -- -- -- -- --              Physical Exam  Constitutional:       General: She is not in acute distress.  HENT:      Head: Normocephalic and atraumatic.   Cardiovascular:      Rate and Rhythm: Normal rate and regular rhythm.   Pulmonary:      Effort: Pulmonary effort is normal. No respiratory distress.   Abdominal:      General: There is no distension.      Palpations: Abdomen is soft.      Tenderness: There is no abdominal tenderness.   Musculoskeletal:         General: Swelling present. No deformity.      Comments: Swelling over the ACs bilaterally with firm induration  and erythema, erythema also extends over the right arm medially   Skin:     General: Skin is warm and dry.   Neurological:      Mental Status: She is alert. Mental status is at baseline.           Vital signs and nursing notes reviewed.          LAB RESULTS  Recent Results (from the past 24 hour(s))   Basic Metabolic Panel    Collection Time: 02/25/24 10:11 AM    Specimen: Hand, Right; Blood   Result Value Ref Range    Glucose 97 65 - 99 mg/dL    BUN 6 (L) 8 - 23 mg/dL    Creatinine 0.58 0.57 - 1.00 mg/dL    Sodium 142 136 - 145 mmol/L    Potassium 3.7 3.5 - 5.2 mmol/L    Chloride 106 98 - 107 mmol/L    CO2 26.0 22.0 - 29.0 mmol/L    Calcium 8.4 (L) 8.6 - 10.5 mg/dL    BUN/Creatinine Ratio 10.3 7.0 - 25.0    Anion Gap 10.0 5.0 - 15.0 mmol/L    eGFR 101.2 >60.0 mL/min/1.73   CBC Auto Differential    Collection Time: 02/25/24 10:11 AM    Specimen: Hand, Right; Blood   Result Value Ref Range    WBC 9.28 3.40 - 10.80 10*3/mm3    RBC 4.38 3.77 - 5.28 10*6/mm3    Hemoglobin 7.8 (L) 12.0 - 15.9 g/dL    Hematocrit 29.2 (L) 34.0 - 46.6 %    MCV 66.7 (L) 79.0 - 97.0 fL    MCH 17.8 (L) 26.6 - 33.0 pg    MCHC 26.7 (L) 31.5 - 35.7 g/dL    RDW 30.1 (H) 12.3 - 15.4 %    RDW-SD 66.2 (H) 37.0 - 54.0 fl    MPV 8.9 6.0 - 12.0 fL    Platelets 327 140 - 450 10*3/mm3    nRBC 0.0 0.0 - 0.2 /100 WBC   Manual Differential    Collection Time: 02/25/24 10:11 AM    Specimen: Hand, Right; Blood   Result Value Ref Range    Neutrophil % 79.0 (H) 42.7 - 76.0 %    Lymphocyte % 17.0 (L) 19.6 - 45.3 %    Monocyte % 3.0 (L) 5.0 - 12.0 %    Eosinophil % 1.0 0.3 - 6.2 %    Neutrophils Absolute 7.33 (H) 1.70 - 7.00 10*3/mm3    Lymphocytes Absolute 1.58 0.70 - 3.10 10*3/mm3    Monocytes Absolute 0.28 0.10 - 0.90 10*3/mm3    Eosinophils Absolute 0.09 0.00 - 0.40 10*3/mm3    Anisocytosis Mod/2+ None Seen    Hypochromia Mod/2+ None Seen    Microcytes Slight/1+ None Seen    Ovalocytes Slight/1+ None Seen    Polychromasia Slight/1+ None Seen    WBC Morphology  Normal Normal    Platelet Morphology Normal Normal   Duplex Venous Upper Extremity - Right CAR    Collection Time: 02/25/24 10:41 AM   Result Value Ref Range    Right Cephalic Upper Color 1.0     Right Cephalic Forearm Color 1.0     UPPER VENOUS RIGHT MED CUBITAL COLOR 1.0     Right Internal Jugular Spont Y     Right Internal Jugular Phasic Y     Right Internal Jugular Compress C     Right Internal Jugular Augment Y     Right Subclavian Spont Y     Right Subclavian Phasic Y     Right Subclavian Compress C     Right Subclavian Augment Y     Right Axillary Spont Y     Right Axillary Phasic Y     Right Axillary Compress C     Right Axillary Augment Y     Right Brachial Compress C     Right Radial Compress C     Right Ulnar Compress C     Right Basilic Upper Compress C     Right Basilic Forearm Compress C     Right Cephalic Upper Compress N     Right Cephalic Upper Thrombus A     Right Cephalic Forearm Compress N     Right Cephalic Forearm Thrombus A     UPPER VENOUS RIGHT MED CUBITAL COMPRESS N     UPPER VENOUS RIGHT MED CUBITAL THROMBUS A     Left Internal Jugular Spont Y     Left Internal Jugular Phasic Y     Left Internal Jugular Compress C     Left Internal Jugular Augment Y     Left Subclavian Spont Y     Left Subclavian Phasic Y     Left Subclavian Compress C     Left Subclavian Augment Y     BH CV VAS PRELIMINARY FINDINGS SCRIPTING 1.0        Ordered the above labs and reviewed the results.        RADIOLOGY  No Radiology Exams Resulted Within Past 24 Hours    Ordered the above noted radiological studies. Reviewed by me in PACS.                MEDICATIONS GIVEN IN ER  Medications   doxycycline (MONODOX) capsule 100 mg (100 mg Oral Given 2/25/24 1014)               MEDICAL DECISION MAKING, PROGRESS, and CONSULTS    MDM: Patient presented emergency department with superficial thrombophlebitis at IV sites bilaterally.  Otherwise well-appearing, vitals otherwise stable.  May have some associated cellulitis on the  right.  Started antibiotics.  Ultrasound showing SVT without evidence of DVT on the right.  Will discharge with supportive care and outpatient follow-up.  Given return precautions and discharged home.    All labs have been independently reviewed by me.  All radiology studies have been reviewed by me and I have also reviewed the radiology report.   EKG's independently viewed and interpreted by me.  Discussion below represents my analysis of pertinent findings related to patient's condition, differential diagnosis, treatment plan and final disposition.      Additional sources:  - Discussed/ obtained information from independent historians:      - External (non-ED) record review:     - Chronic or social conditions impacting care: Recent admission to the hospital    - Shared decision making: Discussed plan for discharge with outpatient follow-up, patient agrees      Orders placed during this visit:  Orders Placed This Encounter   Procedures    Basic Metabolic Panel    CBC Auto Differential    Manual Differential    CBC & Differential         Additional orders considered but not ordered:  Considered left arm Doppler however this exam is more reassuring, less swelling with clear evidence of SVT        Differential diagnosis includes but is not limited to:    SVT, DVT, cellulitis, inflammatory reaction      Independent interpretation of labs, radiology studies, and discussions with consultants:  ED Course as of 02/25/24 1152   Sun Feb 25, 2024   1026 Hemoglobin(!): 7.8 [FS]      ED Course User Index  [FS] Kaz Alonso MD           DIAGNOSIS  Final diagnoses:   Superficial thrombophlebitis of both upper extremities   Cellulitis of right upper extremity         DISPOSITION  Discharged home        Latest Documented Vital Signs:  As of 11:52 EST  BP- 137/71 HR- 65 Temp- 97.3 °F (36.3 °C) O2 sat- 98%              --    Please note that portions of this were completed with a voice recognition program.       Note Disclaimer:  At [facility], we believe that sharing information builds trust and better relationships. You are receiving this note because you are receiving care at [facility] or recently visited. It is possible you will see health information before a provider has talked with you about it. This kind of information can be easy to misunderstand. To help you fully understand what it means for your health, we urge you to discuss this note with your provider.             Kaz Alonso MD  02/25/24 [id number]

## 2024-02-25 NOTE — ED NOTES
Patient to ER via car from home for wound check    Patient had IV was d/c Thursday patient has swelling and redness to L arm

## 2024-02-25 NOTE — PROGRESS NOTES
Transitional Care Follow Up Visit  Subjective     Dudley Estrada is a 64 y.o. female who presents for a transitional care management visit.    Within 48 business hours after discharge our office contacted her via telephone to coordinate her care and needs.      I reviewed and discussed the details of that call along with the discharge summary, hospital problems, inpatient lab results, inpatient diagnostic studies, and consultation reports with Dudley.     Current outpatient and discharge medications have been reconciled for the patient.  Reviewed by: TANA Villa          2/22/2024     5:47 PM   Date of TCM Phone Call   [facility]   Date of Admission 2/20/2024   Date of Discharge 2/22/2024   Discharge Disposition Home or Self Care     Risk for Readmission (LACE) Score: 5 (2/22/2024  6:00 AM)      History of Present Illness   Course During Hospital Stay: She was admitted to [facility] 02/20/24 with severe anemia with hemoglobin 4.9 and ferritin . Weakness and dyspnea on exertion reported. Received 3 units of blood. Bidirectional endoscopy with Dr. Archer 02/22. CLS revealed diverticulosis. EGD revealed Mark erosions associated with hiatal hernia and Candida esophagitis. Started Protonix 40 mg bid and oral iron replacement. Advised to limit NSAID use.     Returned to ER yesterday with bilateral arm pain and redness where she had IV's while inpatient. Right upper extremity doppler revealed acute superficial thrombophlebitis. She was discharged home and started on Doxycycline. Redness improving. She can breath better. Cough is better.      The following portions of the patient's history were reviewed and updated as appropriate: allergies, current medications, past family history, past medical history, past social history, past surgical history, and problem list.    Review of Systems    Objective   Physical Exam  Vitals reviewed.   Constitutional:       Appearance: Normal appearance. She is  well-developed. She is obese.   HENT:      Head: Normocephalic and atraumatic.   Cardiovascular:      Rate and Rhythm: Normal rate and regular rhythm.      Heart sounds: Normal heart sounds, S1 normal and S2 normal.   Pulmonary:      Effort: Pulmonary effort is normal.      Breath sounds: Normal breath sounds.   Musculoskeletal:      Comments: Swelling noted upper extremities. R-AC space to handbreadth above and below pink.    Skin:     General: Skin is warm and dry.   Neurological:      Mental Status: She is alert.   Psychiatric:         Mood and Affect: Mood normal.         Behavior: Behavior normal.         Assessment & Plan   Diagnoses and all orders for this visit:    1. Hospital discharge follow-up (Primary)  Comments:  Breathing better.Energy improved after 3u prbc and IV iron. CLS negative EGD with Mark mena. Continues Pantoprazole 40 mg bid.    2. Iron deficiency anemia due to chronic blood loss  Comments:  Capsule endoscopy advised. Schedule with Joleen FAJARDO GI. Repeat CBC, ferritin, iron profile in 1M and 3M  Orders:  -     Ambulatory Referral to Gastroenterology  -     CBC & Differential; Future  -     CBC & Differential; Future  -     ferrous sulfate 325 (65 FE) MG EC tablet; Take 1 tablet daily for 2 weeks and increased to bid if tolerated.  Dispense: 180 tablet; Refill: 1  -     Ferritin; Future  -     Iron Profile; Future  -     Ferritin; Future  -     Iron Profile; Future    3. Thrombophlebitis arm  Comments:  Improving with Doxycycline. Venous doppler RUE negative for DVT.

## 2024-02-26 ENCOUNTER — OFFICE VISIT (OUTPATIENT)
Dept: INTERNAL MEDICINE | Facility: CLINIC | Age: 64
End: 2024-02-26
Payer: COMMERCIAL

## 2024-02-26 VITALS
BODY MASS INDEX: 34.16 KG/M2 | TEMPERATURE: 98.4 F | HEIGHT: 63 IN | WEIGHT: 192.8 LBS | HEART RATE: 68 BPM | RESPIRATION RATE: 16 BRPM

## 2024-02-26 DIAGNOSIS — I80.8 THROMBOPHLEBITIS ARM: ICD-10-CM

## 2024-02-26 DIAGNOSIS — Z09 HOSPITAL DISCHARGE FOLLOW-UP: Primary | ICD-10-CM

## 2024-02-26 DIAGNOSIS — D50.0 IRON DEFICIENCY ANEMIA DUE TO CHRONIC BLOOD LOSS: ICD-10-CM

## 2024-02-26 PROCEDURE — 99495 TRANSJ CARE MGMT MOD F2F 14D: CPT | Performed by: NURSE PRACTITIONER

## 2024-02-26 RX ORDER — LANOLIN ALCOHOL/MO/W.PET/CERES
CREAM (GRAM) TOPICAL
Qty: 180 TABLET | Refills: 1 | Status: SHIPPED | OUTPATIENT
Start: 2024-02-26

## 2024-02-26 NOTE — PATIENT INSTRUCTIONS
Please be mindful that heartburn/reflux can be exacerbated by what we eat and drink. Coffee, tea and carbonated beverages (that includes both soda and water) can make your reflux worse as can chocolate, mints, tomato based products as well as citrus. Also remember to stay in an upright position at least two hours prior to laying down so be sure not to eat late then go to bed!

## 2024-03-06 ENCOUNTER — TELEPHONE (OUTPATIENT)
Dept: GASTROENTEROLOGY | Facility: CLINIC | Age: 64
End: 2024-03-06

## 2024-03-06 DIAGNOSIS — B37.81 CANDIDAL ESOPHAGITIS: Primary | ICD-10-CM

## 2024-03-06 RX ORDER — FLUCONAZOLE 100 MG/1
TABLET ORAL
Qty: 23 TABLET | Refills: 0 | Status: SHIPPED | OUTPATIENT
Start: 2024-03-06

## 2024-03-06 NOTE — TELEPHONE ENCOUNTER
Hub staff attempted to follow warm transfer process and was unsuccessful     Caller: Dudley Estrada    Relationship to patient: Self    Best call back number: [phone]     Patient is needing: PT IS RETURNING PHONE CALL FROM [facility]. PLEASE CALL PT BACK AND ADVISE.

## 2024-03-11 ENCOUNTER — TELEPHONE (OUTPATIENT)
Dept: CARDIOLOGY | Facility: CLINIC | Age: 64
End: 2024-03-11
Payer: COMMERCIAL

## 2024-03-12 ENCOUNTER — HOSPITAL ENCOUNTER (OUTPATIENT)
Dept: CARDIOLOGY | Facility: HOSPITAL | Age: 64
Discharge: HOME | End: 2024-03-12
Admitting: NURSE PRACTITIONER
Payer: COMMERCIAL

## 2024-03-12 VITALS
OXYGEN SATURATION: 98 % | WEIGHT: 191 LBS | HEIGHT: 62 IN | BODY MASS INDEX: 35.15 KG/M2 | HEART RATE: 65 BPM | SYSTOLIC BLOOD PRESSURE: 152 MMHG | DIASTOLIC BLOOD PRESSURE: 84 MMHG

## 2024-03-12 LAB
AORTIC ARCH: 3.1 CM
ASCENDING AORTA: 3.3 CM
BH CV ECHO MEAS - ACS: 1.7 CM
BH CV ECHO MEAS - AO MAX PG: 10.1 MMHG
BH CV ECHO MEAS - AO MEAN PG: 5 MMHG
BH CV ECHO MEAS - AO ROOT DIAM: 3.1 CM
BH CV ECHO MEAS - AO V2 MAX: 159 CM/SEC
BH CV ECHO MEAS - AO V2 VTI: 37.4 CM
BH CV ECHO MEAS - AVA(I,D): 2.1 CM2
BH CV ECHO MEAS - EDV(CUBED): 157.5 ML
BH CV ECHO MEAS - EDV(MOD-SP4): 134 ML
BH CV ECHO MEAS - EF(MOD-BP): 64 %
BH CV ECHO MEAS - EF(MOD-SP4): 64.2 %
BH CV ECHO MEAS - ESV(CUBED): 16.5 ML
BH CV ECHO MEAS - ESV(MOD-SP4): 48 ML
BH CV ECHO MEAS - FS: 52.9 %
BH CV ECHO MEAS - IVS/LVPW: 1.33 CM
BH CV ECHO MEAS - IVSD: 1.2 CM
BH CV ECHO MEAS - LAT PEAK E' VEL: 3.8 CM/SEC
BH CV ECHO MEAS - LV DIASTOLIC VOL/BSA (35-75): 70.7 CM2
BH CV ECHO MEAS - LV MASS(C)D: 220.6 GRAMS
BH CV ECHO MEAS - LV MAX PG: 5.3 MMHG
BH CV ECHO MEAS - LV MEAN PG: 3 MMHG
BH CV ECHO MEAS - LV SYSTOLIC VOL/BSA (12-30): 25.3 CM2
BH CV ECHO MEAS - LV V1 MAX: 115 CM/SEC
BH CV ECHO MEAS - LV V1 VTI: 29.3 CM
BH CV ECHO MEAS - LVIDD: 5.4 CM
BH CV ECHO MEAS - LVIDS: 2.5 CM
BH CV ECHO MEAS - LVOT AREA: 2.7 CM2
BH CV ECHO MEAS - LVOT DIAM: 1.85 CM
BH CV ECHO MEAS - LVPWD: 0.9 CM
BH CV ECHO MEAS - MED PEAK E' VEL: 5.8 CM/SEC
BH CV ECHO MEAS - MR MAX PG: 93.4 MMHG
BH CV ECHO MEAS - MR MAX VEL: 483.2 CM/SEC
BH CV ECHO MEAS - MV A DUR: 0.12 SEC
BH CV ECHO MEAS - MV A MAX VEL: 126 CM/SEC
BH CV ECHO MEAS - MV DEC SLOPE: 284.4 CM/SEC2
BH CV ECHO MEAS - MV DEC TIME: 0.29 SEC
BH CV ECHO MEAS - MV E MAX VEL: 91.3 CM/SEC
BH CV ECHO MEAS - MV E/A: 0.72
BH CV ECHO MEAS - MV MAX PG: 7.8 MMHG
BH CV ECHO MEAS - MV MEAN PG: 2.6 MMHG
BH CV ECHO MEAS - MV P1/2T: 117.4 MSEC
BH CV ECHO MEAS - MV V2 VTI: 53.2 CM
BH CV ECHO MEAS - MVA(P1/2T): 1.87 CM2
BH CV ECHO MEAS - MVA(VTI): 1.47 CM2
BH CV ECHO MEAS - PA ACC TIME: 0.16 SEC
BH CV ECHO MEAS - PA V2 MAX: 100.4 CM/SEC
BH CV ECHO MEAS - PULM A REVS DUR: 0.14 SEC
BH CV ECHO MEAS - PULM A REVS VEL: 34.6 CM/SEC
BH CV ECHO MEAS - PULM DIAS VEL: 31.6 CM/SEC
BH CV ECHO MEAS - PULM S/D: 2.17
BH CV ECHO MEAS - PULM SYS VEL: 68.7 CM/SEC
BH CV ECHO MEAS - QP/QS: 0.9
BH CV ECHO MEAS - RAP SYSTOLE: 3 MMHG
BH CV ECHO MEAS - RV MAX PG: 3.2 MMHG
BH CV ECHO MEAS - RV V1 MAX: 89.5 CM/SEC
BH CV ECHO MEAS - RV V1 VTI: 23.2 CM
BH CV ECHO MEAS - RVOT DIAM: 1.96 CM
BH CV ECHO MEAS - RVSP: 27.4 MMHG
BH CV ECHO MEAS - SI(MOD-SP4): 45.4 ML/M2
BH CV ECHO MEAS - SUP REN AO DIAM: 2.3 CM
BH CV ECHO MEAS - SV(LVOT): 78.4 ML
BH CV ECHO MEAS - SV(MOD-SP4): 86 ML
BH CV ECHO MEAS - SV(RVOT): 70.3 ML
BH CV ECHO MEAS - TAPSE (>1.6): 2.22 CM
BH CV ECHO MEAS - TR MAX PG: 24.4 MMHG
BH CV ECHO MEAS - TR MAX VEL: 247 CM/SEC
BH CV ECHO MEASUREMENTS AVERAGE E/E' RATIO: 19.02
BH CV STRESS BP STAGE 1: NORMAL
BH CV STRESS BP STAGE 2: NORMAL
BH CV STRESS DURATION MIN STAGE 1: 3
BH CV STRESS DURATION MIN STAGE 2: 3
BH CV STRESS DURATION SEC STAGE 1: 0
BH CV STRESS DURATION SEC STAGE 2: 0
BH CV STRESS ECHO POST STRESS EJECTION FRACTION EF: 71 %
BH CV STRESS GRADE STAGE 1: 10
BH CV STRESS GRADE STAGE 2: 12
BH CV STRESS HR STAGE 1: 118
BH CV STRESS HR STAGE 2: 145
BH CV STRESS METS STAGE 1: 5
BH CV STRESS METS STAGE 2: 7.5
BH CV STRESS PROTOCOL 1: NORMAL
BH CV STRESS SPEED STAGE 1: 1.7
BH CV STRESS SPEED STAGE 2: 2.5
BH CV STRESS STAGE 1: 1
BH CV STRESS STAGE 2: 2
BH CV XLRA - RV BASE: 3 CM
BH CV XLRA - RV LENGTH: 7.1 CM
BH CV XLRA - RV MID: 2.7 CM
BH CV XLRA - TDI S': 18 CM/SEC
LEFT ATRIUM VOLUME INDEX: 45.1 ML/M2
MAXIMAL PREDICTED HEART RATE: 156 BPM
PERCENT MAX PREDICTED HR: 92.95 %
SINUS: 3 CM
STJ: 3 CM
STRESS BASELINE BP: NORMAL MMHG
STRESS BASELINE HR: 53 BPM
STRESS PERCENT HR: 109 %
STRESS POST ESTIMATED WORKLOAD: 7.5 METS
STRESS POST EXERCISE DUR MIN: 6 MIN
STRESS POST EXERCISE DUR SEC: 0 SEC
STRESS POST PEAK BP: NORMAL MMHG
STRESS POST PEAK HR: 145 BPM
STRESS TARGET HR: 133 BPM

## 2024-03-12 PROCEDURE — 93350 STRESS TTE ONLY: CPT

## 2024-03-12 PROCEDURE — 93325 DOPPLER ECHO COLOR FLOW MAPG: CPT

## 2024-03-12 PROCEDURE — 93325 DOPPLER ECHO COLOR FLOW MAPG: CPT | Performed by: INTERNAL MEDICINE

## 2024-03-12 PROCEDURE — 93016 CV STRESS TEST SUPVJ ONLY: CPT | Performed by: INTERNAL MEDICINE

## 2024-03-12 PROCEDURE — 93320 DOPPLER ECHO COMPLETE: CPT

## 2024-03-12 PROCEDURE — 93356 MYOCRD STRAIN IMG SPCKL TRCK: CPT

## 2024-03-12 PROCEDURE — 25510000001 PERFLUTREN (DEFINITY) 8.476 MG IN SODIUM CHLORIDE (PF) 0.9 % 10 ML INJECTION: Performed by: NURSE PRACTITIONER

## 2024-03-12 PROCEDURE — 93320 DOPPLER ECHO COMPLETE: CPT | Performed by: INTERNAL MEDICINE

## 2024-03-12 PROCEDURE — 93350 STRESS TTE ONLY: CPT | Performed by: INTERNAL MEDICINE

## 2024-03-12 PROCEDURE — 93017 CV STRESS TEST TRACING ONLY: CPT

## 2024-03-12 PROCEDURE — 93352 ADMIN ECG CONTRAST AGENT: CPT | Performed by: INTERNAL MEDICINE

## 2024-03-12 PROCEDURE — 93018 CV STRESS TEST I&R ONLY: CPT | Performed by: INTERNAL MEDICINE

## 2024-03-12 RX ADMIN — PERFLUTREN 3 ML: 6.52 INJECTION, SUSPENSION INTRAVENOUS at 10:40

## 2024-03-20 RX ORDER — PANTOPRAZOLE SODIUM 40 MG/1
40 TABLET, DELAYED RELEASE ORAL
Qty: 60 TABLET | Refills: 1 | Status: SHIPPED | OUTPATIENT
Start: 2024-03-20

## 2024-03-26 ENCOUNTER — LAB (OUTPATIENT)
Facility: HOSPITAL | Age: 64
End: 2024-03-26
Payer: COMMERCIAL

## 2024-03-26 DIAGNOSIS — D50.0 IRON DEFICIENCY ANEMIA DUE TO CHRONIC BLOOD LOSS: ICD-10-CM

## 2024-03-26 PROCEDURE — 82728 ASSAY OF FERRITIN: CPT | Performed by: NURSE PRACTITIONER

## 2024-03-26 PROCEDURE — 84466 ASSAY OF TRANSFERRIN: CPT | Performed by: NURSE PRACTITIONER

## 2024-03-26 PROCEDURE — 83540 ASSAY OF IRON: CPT | Performed by: NURSE PRACTITIONER

## 2024-03-28 ENCOUNTER — OFFICE VISIT (OUTPATIENT)
Dept: GASTROENTEROLOGY | Facility: CLINIC | Age: 64
End: 2024-03-28
Payer: COMMERCIAL

## 2024-03-28 VITALS
HEIGHT: 63 IN | HEART RATE: 72 BPM | DIASTOLIC BLOOD PRESSURE: 88 MMHG | WEIGHT: 186 LBS | OXYGEN SATURATION: 99 % | BODY MASS INDEX: 32.96 KG/M2 | SYSTOLIC BLOOD PRESSURE: 148 MMHG | TEMPERATURE: 98.4 F

## 2024-03-28 DIAGNOSIS — K21.00 GASTROESOPHAGEAL REFLUX DISEASE WITH ESOPHAGITIS WITHOUT HEMORRHAGE: Primary | Chronic | ICD-10-CM

## 2024-03-28 DIAGNOSIS — Z12.11 COLON CANCER SCREENING: ICD-10-CM

## 2024-03-28 DIAGNOSIS — B37.81 CANDIDAL ESOPHAGITIS: ICD-10-CM

## 2024-03-28 NOTE — PROGRESS NOTES
"Chief Complaint   Patient presents with    Follow-up     GERD, anemia, 5 cm hiatal hernia, Mark's erosions         History of Present Illness  64-year-old female presents the office today for follow-up.  She has never been seen in the office.  She underwent EGD and colonoscopy on 2/22/2024.  EGD revealed a 5 cm hiatal hernia, plaques in the upper third of the esophagus, and dispersed linear Mark's erosions in the stomach.  Small bowel biopsy was unremarkable.  Esophagus biopsy revealed minimal acute inflammation and fungal yeast and hyphal elements consistent with Candida esophagitis; negative for Wheat's.  Colonoscopy revealed diverticulosis and nonbleeding internal hemorrhoids.    She continues pantoprazole 40 mg twice daily. She denies any heartburn, reflux, nausea, vomiting, or dysphagia. She has cut way back on caffeine to see if this will help.     She has a history of anemia as evidenced on lab work.  CBC on 3/26/2024 revealed an H/H of 11.4/38%.  This is greatly improved over values a month ago which were 7.8/29.2% respectively.  Iron profile on 3/26/2024 revealed an iron saturation of 13% which is greatly improved over a value of 3% 1 month ago.  She continues ferrous sulfate 325 mg once daily. She has discussed iron usage with Haydee Sherman and the plan is for her to increase her ferrous sulfate to twice daily dosing.     She denies any change in bowel habits, rectal bleeding, or lower abdominal pain. She denies any constipation or diarrhea. She has never had colon polyps. She denies any family history of colon cancer in first degree relatives.     Result Review :       UPPER GI ENDOSCOPY (02/22/2024 07:49)   COLONOSCOPY (02/22/2024 07:48)   Tissue Pathology Exam (02/22/2024 09:14)   Iron Profile (03/26/2024 08:41)   CBC & Differential (03/26/2024 08:41)     Vital Signs:   /88   Pulse 72   Temp 98.4 °F (36.9 °C)   Ht 160 cm (63\")   Wt 84.4 kg (186 lb)   SpO2 99%   BMI 32.95 kg/m²   "   Body mass index is 32.95 kg/m².     Physical Exam  Vitals reviewed.   Constitutional:       Appearance: Normal appearance.   Pulmonary:      Effort: Pulmonary effort is normal. No respiratory distress.   Abdominal:      General: Abdomen is flat. Bowel sounds are normal. There is no distension.      Palpations: Abdomen is soft. There is no mass.      Tenderness: There is no abdominal tenderness. There is no guarding.   Musculoskeletal:         General: Normal range of motion.   Skin:     General: Skin is warm and dry.   Neurological:      General: No focal deficit present.      Mental Status: She is alert and oriented to person, place, and time.   Psychiatric:         Mood and Affect: Mood normal.         Behavior: Behavior normal.         Thought Content: Thought content normal.         Judgment: Judgment normal.       Assessment and Plan    Diagnoses and all orders for this visit:    1. Gastroesophageal reflux disease with esophagitis without hemorrhage (Primary)    2. Candidal esophagitis  Comments:  Recently treated    3. Colon cancer screening       Patient Instructions   1.  For GERD and 5 cm hiatal hernia continue pantoprazole 40 mg twice daily.    2. For GERD and hiatal hernia, we recommend avoiding eating 3-4 hours before bedtime, eating smaller more frequent meals, and avoiding any known food triggers including spicy foods, tomatoes and tomato-based sauces, chocolate, coffee/tea, citrus fruits, carbonated  beverages and alcohol.     3.  Next colonoscopy will be due at a 10-year interval, February 2034 and you have been placed in recall accordingly.    4.  Recommend next office follow-up visit 1 yr  for reassessment of symptoms and medication refills.     5.  Please be sure to complete your lab work in May to check your H/H. Once your hemoglobin has reached the normal range, you may decrease your pantoprazole down to 1 tab daily.     6.   Continue oral iron per your PCP, Haydee FAJARDO.        Discussion:  EGD findings and pathology reviewed with patient today during her follow-up visit.  Patient's hemoglobin has responded nicely to twice daily pantoprazole dosing and ferrous sulfate.  Patient states that she is to increase her ferrous sulfate to twice daily per her PCP.  She plans to have repeat lab work done in May.  I have counseled the patient that it once her hemoglobin reaches a normal value that she can decrease her pantoprazole to once daily dosing and to follow with TANA Abarca in regards to her iron therapy.  At this point in time I do not feel that capsule endoscopy is indicated.  However in the future should her hemoglobin start to trend downward again, we could consider this for further workup.  Ultimately I feel that the Mark's erosions were likely the culprit for her anemia.    Next colonoscopy due at a 10-year interval, February 2034.  Recommend office follow-up in 1 year for reassessment of symptoms and medication refills or sooner should symptoms worsen/recur.  Patient verbalized understanding of above plan of care and is in agreement.  All questions answered and support provided.    EMR Dragon/Transcription Disclaimer:  This document has been Dictated utilizing Dragon dictation.

## 2024-04-01 NOTE — PROGRESS NOTES
Subjective   Chief Complaint   Patient presents with    Anemia       History of Present Illness   64 y.o. female presents for follow up regarding anemia secondary to Mark's erosions. Feeling good without complaints. No hematochezia or melena. She has since had follow up with GI, Haydee FAJARDO; advised to continue Pantoprazole 40 mg bid until hemoglobin reaches normal range at which time ok to decrease to once daily. Capsule endoscopy not indicated as anemia thought to be secondary to Mark's erosions.     Patient Active Problem List   Diagnosis    Essential hypertension    Obesity (BMI 30-39.9)    Calculus of gallbladder without cholecystitis without obstruction    Symptomatic anemia    Iron deficiency anemia due to chronic blood loss    Diverticulosis    Anemia    Diaphragmatic hernia without obstruction or gangrene    Mark's erosions a/w large hiatal hernia       No Known Allergies    Current Outpatient Medications on File Prior to Visit   Medication Sig Dispense Refill    ferrous sulfate 325 (65 FE) MG EC tablet Take 1 tablet daily for 2 weeks and increased to bid if tolerated. 180 tablet 1    losartan (COZAAR) 50 MG tablet TAKE 1 TABLET BY MOUTH EVERY DAY (Patient taking differently: Take 1 tablet by mouth Daily.) 90 tablet 1    Multiple Vitamins-Minerals (ICAPS AREDS 2 PO) Take 1 tablet by mouth 2 (Two) Times a Day.      Multiple Vitamins-Minerals (MULTI-VITAMIN GUMMIES PO) Take 1 tablet by mouth Daily.      pantoprazole (PROTONIX) 40 MG EC tablet Take 1 tablet by mouth 2 (Two) Times a Day Before Meals. 60 tablet 1     No current facility-administered medications on file prior to visit.       Past Medical History:   Diagnosis Date    Abnormal Pap smear of cervix     years ago     Ankle sprain 1991    Hypertension     Knee swelling 2021       Family History   Problem Relation Age of Onset    Hypertension Mother     Diabetes Mother          ; stroke complications     Diverticulitis Mother     Stroke Mother     Heart disease Father     Hearing loss Father          2017    Gallbladder disease Sister     Diverticulitis Sister     Breast cancer Neg Hx     Ovarian cancer Neg Hx     Uterine cancer Neg Hx     Colon cancer Neg Hx        Social History     Socioeconomic History    Marital status:    Tobacco Use    Smoking status: Never    Smokeless tobacco: Never   Vaping Use    Vaping status: Never Used   Substance and Sexual Activity    Alcohol use: Yes     Alcohol/week: 3.0 standard drinks of alcohol     Types: 1 Glasses of wine, 1 Cans of beer, 1 Shots of liquor per week     Comment: occ    Drug use: Never    Sexual activity: Yes     Partners: Male     Birth control/protection: Post-menopausal       Past Surgical History:   Procedure Laterality Date    COLONOSCOPY N/A 2021    Procedure: COLONOSCOPY;  Surgeon: Bishnu Alvarez MD;  Location: [facility] [place];  Service: Gastroenterology;  Laterality: N/A;  hemorrhoids, diverticulosis,    COLONOSCOPY N/A 2024    Procedure: COLONOSCOPY TO CECUM;  Surgeon: Flaco Archer MD;  Location: [facility] ENDOSCOPY;  Service: Gastroenterology;  Laterality: N/A;  PRE- ANEMIA  POST-DIVERTICULOSIS, HEMORRHOIDS    ENDOSCOPY N/A 2024    Procedure: ESOPHAGOGASTRODUODENOSCOPY WITH COLD BIOPSIES;  Surgeon: Flaco Archer MD;  Location: [facility] ENDOSCOPY;  Service: Gastroenterology;  Laterality: N/A;  PRE- ANEMIA  POST- HIATAL HERNIA, CAMERAN'S EROSIONS, POSSIBLE CANDIDA    UPPER GASTROINTESTINAL ENDOSCOPY      WISDOM TOOTH EXTRACTION      bottom       The following portions of the patient's history were reviewed and updated as appropriate: problem list, allergies, current medications, past medical history, past social history, and past surgical history.    Review of Systems    Immunization History   Administered Date(s) Administered    COVID-19 (MODERNA) BIVALENT 12+YRS 2022    COVID-19 (PFIZER) Purple Cap  "Monovalent 03/02/2021, 03/23/2021, 10/07/2021    COVID-19 F23 (NOVAVAX) 12YRS+ 12/18/2023    Covid-19 (Pfizer) Gray Cap Monovalent 05/21/2022    Flu Vaccine Intradermal Quad 18-64YR 10/06/2020    Fluzone Quad >6mos (Multi-dose) 09/23/2021    Influenza Injectable Mdck Pf Quad 11/01/2022, 11/03/2023    Influenza, Unspecified 09/23/2019    Shingrix 03/02/2020, 06/22/2020    Tdap 09/23/2019    flucelvax quad pfs =>4 YRS 09/23/2019, 10/08/2020       Objective   Vitals:    04/04/24 1402   BP: 120/72   Pulse: 80   Resp: 16   Temp: 98.1 °F (36.7 °C)   Weight: 84.4 kg (186 lb)   Height: 160 cm (62.99\")     Body mass index is 32.96 kg/m².  Physical Exam  Vitals reviewed.   Constitutional:       Appearance: Normal appearance. She is well-developed.   HENT:      Head: Normocephalic and atraumatic.   Cardiovascular:      Rate and Rhythm: Normal rate and regular rhythm.      Heart sounds: Normal heart sounds, S1 normal and S2 normal.   Pulmonary:      Effort: Pulmonary effort is normal.      Breath sounds: Normal breath sounds.   Abdominal:      General: Bowel sounds are normal. There is no distension.      Palpations: Abdomen is soft.      Tenderness: There is no abdominal tenderness.   Skin:     General: Skin is warm and dry.   Neurological:      Mental Status: She is alert.   Psychiatric:         Mood and Affect: Mood normal.         Behavior: Behavior normal.         Procedures    Assessment & Plan   Diagnoses and all orders for this visit:    1. Iron deficiency anemia due to chronic blood loss (Primary)  Comments:  Camerons erosions on EGD. Continues Pantoprazole 40 mg bid and oral iron bid. Hemoglobin improving. CBC, ferritin, iron profile in 3 months.  Orders:  -     CBC (No Diff); Future  -     Ferritin; Future  -     Iron Profile; Future    2. Essential hypertension  Comments:  Controlled. Continue current medications and RTO in 6M.      Records reviewed include previous OV with myself as well as labs.     Return in " about 6 months (around 10/4/2024) for Cancel August 6M , Lab B4 FUP & in 3M.

## 2024-04-04 ENCOUNTER — OFFICE VISIT (OUTPATIENT)
Dept: INTERNAL MEDICINE | Facility: CLINIC | Age: 64
End: 2024-04-04
Payer: COMMERCIAL

## 2024-04-04 VITALS
HEART RATE: 80 BPM | WEIGHT: 186 LBS | BODY MASS INDEX: 32.96 KG/M2 | HEIGHT: 63 IN | RESPIRATION RATE: 16 BRPM | DIASTOLIC BLOOD PRESSURE: 72 MMHG | SYSTOLIC BLOOD PRESSURE: 120 MMHG | TEMPERATURE: 98.1 F

## 2024-04-04 DIAGNOSIS — I10 ESSENTIAL HYPERTENSION: Chronic | ICD-10-CM

## 2024-04-04 DIAGNOSIS — D50.0 IRON DEFICIENCY ANEMIA DUE TO CHRONIC BLOOD LOSS: Primary | ICD-10-CM

## 2024-04-04 PROCEDURE — 99214 OFFICE O/P EST MOD 30 MIN: CPT | Performed by: NURSE PRACTITIONER

## 2024-04-16 RX ORDER — PANTOPRAZOLE SODIUM 40 MG/1
40 TABLET, DELAYED RELEASE ORAL
Qty: 180 TABLET | Refills: 1 | Status: SHIPPED | OUTPATIENT
Start: 2024-04-16

## 2024-05-15 DIAGNOSIS — D50.0 IRON DEFICIENCY ANEMIA DUE TO CHRONIC BLOOD LOSS: ICD-10-CM

## 2024-05-22 LAB
BASOPHILS # BLD AUTO: 0.05 10*3/MM3 (ref 0–0.2)
BASOPHILS NFR BLD AUTO: 0.8 % (ref 0–1.5)
EOSINOPHIL # BLD AUTO: 0.12 10*3/MM3 (ref 0–0.4)
EOSINOPHIL NFR BLD AUTO: 2 % (ref 0.3–6.2)
ERYTHROCYTE [DISTWIDTH] IN BLOOD BY AUTOMATED COUNT: 14.4 % (ref 12.3–15.4)
FERRITIN SERPL-MCNC: 44.6 NG/ML (ref 13–150)
HCT VFR BLD AUTO: 38.4 % (ref 34–46.6)
HGB BLD-MCNC: 12.1 G/DL (ref 12–15.9)
IMM GRANULOCYTES # BLD AUTO: 0.02 10*3/MM3 (ref 0–0.05)
IMM GRANULOCYTES NFR BLD AUTO: 0.3 % (ref 0–0.5)
LYMPHOCYTES # BLD AUTO: 1.61 10*3/MM3 (ref 0.7–3.1)
LYMPHOCYTES NFR BLD AUTO: 27.1 % (ref 19.6–45.3)
MCH RBC QN AUTO: 27.6 PG (ref 26.6–33)
MCHC RBC AUTO-ENTMCNC: 31.5 G/DL (ref 31.5–35.7)
MCV RBC AUTO: 87.7 FL (ref 79–97)
MONOCYTES # BLD AUTO: 0.64 10*3/MM3 (ref 0.1–0.9)
MONOCYTES NFR BLD AUTO: 10.8 % (ref 5–12)
NEUTROPHILS # BLD AUTO: 3.5 10*3/MM3 (ref 1.7–7)
NEUTROPHILS NFR BLD AUTO: 59 % (ref 42.7–76)
NRBC BLD AUTO-RTO: 0 /100 WBC (ref 0–0.2)
PLATELET # BLD AUTO: 277 10*3/MM3 (ref 140–450)
RBC # BLD AUTO: 4.38 10*6/MM3 (ref 3.77–5.28)
WBC # BLD AUTO: 5.94 10*3/MM3 (ref 3.4–10.8)

## 2024-06-17 ENCOUNTER — HOSPITAL ENCOUNTER (OUTPATIENT)
Dept: BONE DENSITY | Facility: HOSPITAL | Age: 64
Discharge: HOME | End: 2024-06-17
Payer: COMMERCIAL

## 2024-06-17 ENCOUNTER — PATIENT MESSAGE (OUTPATIENT)
Dept: INTERNAL MEDICINE | Facility: CLINIC | Age: 64
End: 2024-06-17
Payer: COMMERCIAL

## 2024-06-17 ENCOUNTER — HOSPITAL ENCOUNTER (OUTPATIENT)
Dept: MAMMOGRAPHY | Facility: HOSPITAL | Age: 64
Discharge: HOME | End: 2024-06-17
Payer: COMMERCIAL

## 2024-06-17 DIAGNOSIS — Z12.31 ENCOUNTER FOR SCREENING MAMMOGRAM FOR MALIGNANT NEOPLASM OF BREAST: ICD-10-CM

## 2024-06-17 DIAGNOSIS — Z78.0 POST-MENOPAUSAL: ICD-10-CM

## 2024-06-17 PROCEDURE — 77080 DXA BONE DENSITY AXIAL: CPT

## 2024-06-17 PROCEDURE — 77063 BREAST TOMOSYNTHESIS BI: CPT

## 2024-06-17 PROCEDURE — 77067 SCR MAMMO BI INCL CAD: CPT

## 2024-07-04 NOTE — PROGRESS NOTES
"Subjective   Chief Complaint   Patient presents with    Growth hand       History of Present Illness   64 y.o. female presents with cc pea sized bump in the palm of her left hand that is getting bigger ongoing for several weeks. It is firm. No pain. It does not interfere with her ADLs. She feels like it is \"sending fingers out: one to the center of the palm and one towards her index finger like crabgrass.\"     Patient Active Problem List   Diagnosis    Essential hypertension    Obesity (BMI 30-39.9)    Calculus of gallbladder without cholecystitis without obstruction    Symptomatic anemia    Iron deficiency anemia due to chronic blood loss    Diverticulosis    Anemia    Diaphragmatic hernia without obstruction or gangrene    Mark's erosions a/w large hiatal hernia       No Known Allergies    Current Outpatient Medications on File Prior to Visit   Medication Sig Dispense Refill    ferrous sulfate 325 (65 FE) MG EC tablet Take 1 tablet daily for 2 weeks and increased to bid if tolerated. 180 tablet 1    losartan (COZAAR) 50 MG tablet TAKE 1 TABLET BY MOUTH EVERY DAY (Patient taking differently: Take 1 tablet by mouth Daily.) 90 tablet 1    Multiple Vitamins-Minerals (ICAPS AREDS 2 PO) Take 1 tablet by mouth 2 (Two) Times a Day.      Multiple Vitamins-Minerals (MULTI-VITAMIN GUMMIES PO) Take 1 tablet by mouth Daily.      pantoprazole (PROTONIX) 40 MG EC tablet Take 1 tablet by mouth 2 (Two) Times a Day Before Meals. 180 tablet 1     No current facility-administered medications on file prior to visit.       Past Medical History:   Diagnosis Date    Abnormal Pap smear of cervix     years ago     Ankle sprain ,     Hypertension     Knee swelling 2021       Family History   Problem Relation Age of Onset    Hypertension Mother     Diabetes Mother          ; stroke complications    Diverticulitis Mother     Stroke Mother     Heart disease Father     Hearing loss Father          2017    " Gallbladder disease Sister     Diverticulitis Sister     Breast cancer Neg Hx     Ovarian cancer Neg Hx     Uterine cancer Neg Hx     Colon cancer Neg Hx        Social History     Socioeconomic History    Marital status:    Tobacco Use    Smoking status: Never    Smokeless tobacco: Never   Vaping Use    Vaping status: Never Used   Substance and Sexual Activity    Alcohol use: Yes     Alcohol/week: 3.0 standard drinks of alcohol     Types: 1 Glasses of wine, 1 Cans of beer, 1 Shots of liquor per week     Comment: occ    Drug use: Never    Sexual activity: Yes     Partners: Male     Birth control/protection: Post-menopausal       Past Surgical History:   Procedure Laterality Date    COLONOSCOPY N/A 12/30/2021    Procedure: COLONOSCOPY;  Surgeon: Bishnu Alvarez MD;  Location: [facility] [place] OR;  Service: Gastroenterology;  Laterality: N/A;  hemorrhoids, diverticulosis,    COLONOSCOPY N/A 02/22/2024    Procedure: COLONOSCOPY TO CECUM;  Surgeon: Flaco Archer MD;  Location: [facility] ENDOSCOPY;  Service: Gastroenterology;  Laterality: N/A;  PRE- ANEMIA  POST-DIVERTICULOSIS, HEMORRHOIDS    ENDOSCOPY N/A 02/22/2024    Procedure: ESOPHAGOGASTRODUODENOSCOPY WITH COLD BIOPSIES;  Surgeon: Flaco Archer MD;  Location: [facility] ENDOSCOPY;  Service: Gastroenterology;  Laterality: N/A;  PRE- ANEMIA  POST- HIATAL HERNIA, CAMERAN'S EROSIONS, POSSIBLE CANDIDA    UPPER GASTROINTESTINAL ENDOSCOPY      WISDOM TOOTH EXTRACTION  1987    bottom       The following portions of the patient's history were reviewed and updated as appropriate: problem list, allergies, current medications, past medical history, and past social history.    Review of Systems    Immunization History   Administered Date(s) Administered    COVID-19 (MODERNA) BIVALENT 12+YRS 11/01/2022    COVID-19 (PFIZER) Purple Cap Monovalent 03/02/2021, 03/23/2021, 10/07/2021    COVID-19 F23 (NOVAVAX) 12YRS+ 12/18/2023    Covid-19 (Pfizer) Gray Cap Monovalent  "05/21/2022    Flu Vaccine Intradermal Quad 18-64YR 10/06/2020    Fluzone Quad >6mos (Multi-dose) 09/23/2021    Influenza Injectable Mdck Pf Quad 11/01/2022, 11/03/2023    Influenza, Unspecified 09/23/2019    Shingrix 03/02/2020, 06/22/2020    Tdap 09/23/2019    flucelvax quad pfs =>4 YRS 09/23/2019, 10/08/2020       Objective   Vitals:    07/05/24 1059   Temp: 97.4 °F (36.3 °C)   Weight: 87.5 kg (193 lb)   Height: 160 cm (62.99\")     Body mass index is 34.2 kg/m².  Physical Exam  Constitutional:       Appearance: Normal appearance. She is obese.   HENT:      Head: Normocephalic and atraumatic.   Pulmonary:      Effort: Pulmonary effort is normal.   Musculoskeletal:      Comments: Ganglion cyst present on the palm of her left hand. Tendon sheath is thickened. NTTP.    Skin:     General: Skin is warm and dry.   Neurological:      Mental Status: She is alert.   Psychiatric:         Mood and Affect: Mood normal.         Behavior: Behavior normal.         Procedures    Assessment & Plan   Diagnoses and all orders for this visit:    1. Ganglion cyst (Primary)  Comments:  Palmar aspect left hand. She is concerned it is \"spreading.\" will have her see Hand.  Orders:  -     Ambulatory Referral to Hand Surgery    2. Health care maintenance  Comments:  She will call and schedule P/P with Dr. Padilla and get RSV in the fall.    Records reviewed include previous OV with myself as well as labs.     Return for Next scheduled follow up.       "

## 2024-07-05 ENCOUNTER — OFFICE VISIT (OUTPATIENT)
Dept: INTERNAL MEDICINE | Facility: CLINIC | Age: 64
End: 2024-07-05
Payer: COMMERCIAL

## 2024-07-05 VITALS — TEMPERATURE: 97.4 F | HEIGHT: 63 IN | WEIGHT: 193 LBS | BODY MASS INDEX: 34.2 KG/M2

## 2024-07-05 DIAGNOSIS — M67.40 GANGLION CYST: Primary | ICD-10-CM

## 2024-07-05 DIAGNOSIS — Z00.00 HEALTH CARE MAINTENANCE: ICD-10-CM

## 2024-07-05 PROCEDURE — 99213 OFFICE O/P EST LOW 20 MIN: CPT | Performed by: NURSE PRACTITIONER

## 2024-07-16 DIAGNOSIS — I10 ESSENTIAL HYPERTENSION: Chronic | ICD-10-CM

## 2024-07-16 RX ORDER — LOSARTAN POTASSIUM 50 MG/1
50 TABLET ORAL DAILY
Qty: 90 TABLET | Refills: 1 | Status: SHIPPED | OUTPATIENT
Start: 2024-07-16

## 2024-09-12 DIAGNOSIS — D50.0 IRON DEFICIENCY ANEMIA DUE TO CHRONIC BLOOD LOSS: ICD-10-CM

## 2024-09-12 RX ORDER — FERROUS SULFATE 325(65) MG
TABLET, DELAYED RELEASE (ENTERIC COATED) ORAL
Qty: 180 TABLET | Refills: 1 | Status: SHIPPED | OUTPATIENT
Start: 2024-09-12

## 2024-10-07 NOTE — PROGRESS NOTES
Subjective   Chief Complaint   Patient presents with    Hypertension    Anemia       History of Present Illness   64 y.o. female presents for follow up regarding HTN and iron deficiency anemia. Denies chest pain or dyspnea. Energy much improved. Not exercising regularly. She has been traveling and eating well. Declines flu shot.      Patient Active Problem List   Diagnosis    Essential hypertension    Obesity (BMI 30-39.9)    Calculus of gallbladder without cholecystitis without obstruction    Iron deficiency anemia due to chronic blood loss    Diverticulosis    Anemia    Diaphragmatic hernia without obstruction or gangrene    Mark's erosions a/w large hiatal hernia       No Known Allergies    Current Outpatient Medications on File Prior to Visit   Medication Sig Dispense Refill    ferrous sulfate 325 (65 FE) MG EC tablet TAKE 1 TABLET DAILY FOR 2 WEEKS AND INCREASED TO TWICE DAILY IF TOLERATED. 180 tablet 1    losartan (COZAAR) 50 MG tablet Take 1 tablet by mouth Daily. 90 tablet 1    Multiple Vitamins-Minerals (ICAPS AREDS 2 PO) Take 1 tablet by mouth 2 (Two) Times a Day.      Multiple Vitamins-Minerals (MULTI-VITAMIN GUMMIES PO) Take 1 tablet by mouth Daily.      pantoprazole (PROTONIX) 40 MG EC tablet TAKE 1 TABLET BY MOUTH 2 TIMES A DAY BEFORE MEALS. 180 tablet 1     No current facility-administered medications on file prior to visit.       Past Medical History:   Diagnosis Date    Abnormal Pap smear of cervix     years ago     Ankle sprain ,     Hypertension     Knee swelling 2021       Family History   Problem Relation Age of Onset    Hypertension Mother     Diabetes Mother          ; stroke complications    Diverticulitis Mother     Stroke Mother     Heart disease Father     Hearing loss Father          2017    Gallbladder disease Sister     Diverticulitis Sister     Breast cancer Neg Hx     Ovarian cancer Neg Hx     Uterine cancer Neg Hx     Colon cancer Neg Hx         Social History     Socioeconomic History    Marital status:    Tobacco Use    Smoking status: Never    Smokeless tobacco: Never   Vaping Use    Vaping status: Never Used   Substance and Sexual Activity    Alcohol use: Yes     Alcohol/week: 3.0 standard drinks of alcohol     Types: 1 Glasses of wine, 1 Cans of beer, 1 Shots of liquor per week     Comment: occ    Drug use: Never    Sexual activity: Yes     Partners: Male     Birth control/protection: Post-menopausal       Past Surgical History:   Procedure Laterality Date    COLONOSCOPY N/A 12/30/2021    Procedure: COLONOSCOPY;  Surgeon: Bishnu Alvarez MD;  [place] [facility] [place];  Service: Gastroenterology;  Laterality: N/A;  hemorrhoids, diverticulosis,    COLONOSCOPY N/A 02/22/2024    Procedure: COLONOSCOPY TO CECUM;  Surgeon: Flaco Archer MD;  Location: [facility] ENDOSCOPY;  Service: Gastroenterology;  Laterality: N/A;  PRE- ANEMIA  POST-DIVERTICULOSIS, HEMORRHOIDS    ENDOSCOPY N/A 02/22/2024    Procedure: ESOPHAGOGASTRODUODENOSCOPY WITH COLD BIOPSIES;  Surgeon: Flaco Archer MD;  Location: [facility] ENDOSCOPY;  Service: Gastroenterology;  Laterality: N/A;  PRE- ANEMIA  POST- HIATAL HERNIA, CAMERAN'S EROSIONS, POSSIBLE CANDIDA    UPPER GASTROINTESTINAL ENDOSCOPY      WISDOM TOOTH EXTRACTION  1987    bottom       The following portions of the patient's history were reviewed and updated as appropriate: problem list, allergies, current medications, past medical history, and past social history.    Review of Systems    Immunization History   Administered Date(s) Administered    COVID-19 (MODERNA) BIVALENT 12+YRS 11/01/2022    COVID-19 (NOVAVAX) 12YRS+ 12/18/2023    COVID-19 (PFIZER) Purple Cap Monovalent 03/02/2021, 03/23/2021, 10/07/2021    Covid-19 (Pfizer) Gray Cap Monovalent 05/21/2022    Flu Vaccine Intradermal Quad 18-64YR 10/06/2020    Fluzone Quad >6mos (Multi-dose) 09/23/2021    Influenza Injectable Mdck Pf Quad 11/01/2022, 11/03/2023  "   Influenza, Unspecified 09/23/2019    Shingrix 03/02/2020, 06/22/2020    Tdap 09/23/2019    flucelvax quad pfs =>4 YRS 09/23/2019, 10/08/2020       Objective   Vitals:    10/14/24 1258   BP: 144/84   Pulse: 80   Resp: 16   Temp: 98.1 °F (36.7 °C)   Weight: 91.8 kg (202 lb 6.4 oz)   Height: 160 cm (62.99\")     Body mass index is 35.86 kg/m².  Physical Exam  Vitals reviewed.   Constitutional:       Appearance: Normal appearance. She is well-developed.   HENT:      Head: Normocephalic and atraumatic.   Cardiovascular:      Rate and Rhythm: Normal rate and regular rhythm.      Heart sounds: Normal heart sounds, S1 normal and S2 normal.   Pulmonary:      Effort: Pulmonary effort is normal.      Breath sounds: Normal breath sounds.   Skin:     General: Skin is warm and dry.   Neurological:      Mental Status: She is alert.   Psychiatric:         Mood and Affect: Mood normal.         Behavior: Behavior normal.     Procedures    Assessment & Plan   Diagnoses and all orders for this visit:    1. Essential hypertension (Primary)  Comments:  Controlled. Continue current medications and RTO 6M.  Orders:  -     Comprehensive Metabolic Panel; Future    2. Iron deficiency anemia due to chronic blood loss  Comments:  Stable. Continue oral iron supplement. RTO with labs in 6M.  Orders:  -     CBC & Differential; Future  -     Iron Profile; Future  -     Ferritin; Future    3. Obesity (BMI 30-39.9)  Comments:  Weight loss via dietary changes and 150 minutes weekly aerobic exercise advised.    4. Screening for metabolic disorder  -     Lipid Panel; Future    Records reviewed include previous OV with myself as well as labs.     Return in about 6 months (around 4/14/2025) for Welcome to Medicare Wellness, Lab B4 FUP.           "

## 2024-10-10 RX ORDER — PANTOPRAZOLE SODIUM 40 MG/1
40 TABLET, DELAYED RELEASE ORAL
Qty: 180 TABLET | Refills: 1 | Status: SHIPPED | OUTPATIENT
Start: 2024-10-10

## 2024-10-14 ENCOUNTER — OFFICE VISIT (OUTPATIENT)
Dept: INTERNAL MEDICINE | Facility: CLINIC | Age: 64
End: 2024-10-14
Payer: COMMERCIAL

## 2024-10-14 VITALS
HEIGHT: 63 IN | HEART RATE: 80 BPM | BODY MASS INDEX: 35.86 KG/M2 | WEIGHT: 202.4 LBS | DIASTOLIC BLOOD PRESSURE: 74 MMHG | RESPIRATION RATE: 16 BRPM | TEMPERATURE: 98.1 F | SYSTOLIC BLOOD PRESSURE: 122 MMHG

## 2024-10-14 DIAGNOSIS — Z13.228 SCREENING FOR METABOLIC DISORDER: ICD-10-CM

## 2024-10-14 DIAGNOSIS — E66.9 OBESITY (BMI 30-39.9): Chronic | ICD-10-CM

## 2024-10-14 DIAGNOSIS — I10 ESSENTIAL HYPERTENSION: Primary | Chronic | ICD-10-CM

## 2024-10-14 DIAGNOSIS — D50.0 IRON DEFICIENCY ANEMIA DUE TO CHRONIC BLOOD LOSS: Chronic | ICD-10-CM

## 2024-10-14 PROCEDURE — 99214 OFFICE O/P EST MOD 30 MIN: CPT | Performed by: NURSE PRACTITIONER

## 2025-01-14 DIAGNOSIS — I10 ESSENTIAL HYPERTENSION: Chronic | ICD-10-CM

## 2025-01-14 RX ORDER — LOSARTAN POTASSIUM 50 MG/1
50 TABLET ORAL DAILY
Qty: 90 TABLET | Refills: 1 | Status: SHIPPED | OUTPATIENT
Start: 2025-01-14

## 2025-03-01 DIAGNOSIS — D50.0 IRON DEFICIENCY ANEMIA DUE TO CHRONIC BLOOD LOSS: ICD-10-CM

## 2025-03-03 RX ORDER — FERROUS SULFATE 325(65) MG
TABLET, DELAYED RELEASE (ENTERIC COATED) ORAL
Qty: 180 TABLET | Refills: 1 | Status: SHIPPED | OUTPATIENT
Start: 2025-03-03

## 2025-04-02 PROBLEM — D50.0 IRON DEFICIENCY ANEMIA DUE TO CHRONIC BLOOD LOSS: Chronic | Status: ACTIVE | Noted: 2024-02-20

## 2025-04-02 NOTE — ASSESSMENT & PLAN NOTE
Patient's (Body mass index is 36.82 kg/m².) indicates that they are obese (BMI >30) with health conditions that include hypertension . Weight is unchanged. BMI  is above average; BMI management plan is completed. We discussed portion control and increasing exercise.

## 2025-04-02 NOTE — PROGRESS NOTES
Subjective   The ABCs of the Annual Wellness Visit  Medicare Wellness Visit      Dudley Estrada is a 65 y.o. patient who presents for a Medicare Wellness Visit.    The following portions of the patient's history were reviewed and   updated as appropriate: allergies, current medications, past family history, past medical history, past social history, past surgical history, and problem list.    Compared to one year ago, the patient's physical   health is better.  Compared to one year ago, the patient's mental   health is the same.    Recent Hospitalizations:  She was not admitted to the hospital during the last year.     Current Medical Providers:  Patient Care Team:  Haydee Sherman APRN as PCP - General (Family Medicine)  Aide Dhaliwal APRN as Nurse Practitioner (Gastroenterology)    Outpatient Medications Prior to Visit   Medication Sig Dispense Refill    ferrous sulfate 325 (65 FE) MG EC tablet TAKE 1 TABLET DAILY FOR 2 WEEKS AND INCREASED TO TWICE DAILY IF TOLERATED. 180 tablet 1    losartan (COZAAR) 50 MG tablet TAKE 1 TABLET BY MOUTH EVERY DAY 90 tablet 1    Multiple Vitamins-Minerals (ICAPS AREDS 2 PO) Take 1 tablet by mouth 2 (Two) Times a Day.      Multiple Vitamins-Minerals (MULTI-VITAMIN GUMMIES PO) Take 1 tablet by mouth Daily.      pantoprazole (PROTONIX) 40 MG EC tablet TAKE 1 TABLET BY MOUTH 2 TIMES A DAY BEFORE MEALS. 180 tablet 3     No facility-administered medications prior to visit.     No opioid medication identified on active medication list. I have reviewed chart for other potential  high risk medication/s and harmful drug interactions in the elderly.      Aspirin is not on active medication list.  Aspirin use is not indicated based on review of current medical condition/s. Risk of harm outweighs potential benefits.  .    Patient Active Problem List   Diagnosis    Essential hypertension    Obesity (BMI 30-39.9)    Calculus of gallbladder without cholecystitis without obstruction     "Iron deficiency anemia due to chronic blood loss    Diaphragmatic hernia without obstruction or gangrene    Mark's erosions a/w large hiatal hernia     Advance Care Planning Advance Directive is not on file.  ACP discussion was held with the patient during this visit. Patient has an advance directive (not in EMR), copy requested.        Objective   Vitals:    04/21/25 1248   BP: 120/74   Pulse: 76   Resp: 16   Temp: 97.8 °F (36.6 °C)   Weight: 94.3 kg (207 lb 12.8 oz)   Height: 160 cm (62.99\")   PainSc: 0-No pain       Estimated body mass index is 36.82 kg/m² as calculated from the following:    Height as of this encounter: 160 cm (62.99\").    Weight as of this encounter: 94.3 kg (207 lb 12.8 oz).    Class 2 Severe Obesity (BMI >=35 and <=39.9). Obesity-related health conditions include the following: hypertension. Obesity is unchanged. BMI is is above average; BMI management plan is completed. We discussed portion control and increasing exercise.         Gait and Balance Evaluation:  Normal     Does the patient have evidence of cognitive impairment? No  Lab Results   Component Value Date    CHLPL 180 04/15/2025    TRIG 158 (H) 04/15/2025    HDL 44 04/15/2025     (H) 04/15/2025    VLDL 28 04/15/2025                                                                                                Health  Risk Assessment    Smoking Status:  Social History     Tobacco Use   Smoking Status Never   Smokeless Tobacco Never     Alcohol Consumption:  Social History     Substance and Sexual Activity   Alcohol Use Yes    Alcohol/week: 3.0 standard drinks of alcohol    Types: 1 Glasses of wine, 1 Cans of beer, 1 Shots of liquor per week    Comment: occ       Fall Risk Screen  STEADI Fall Risk Assessment was completed, and patient is at LOW risk for falls.Assessment completed on:4/21/2025    Depression Screening   Little interest or pleasure in doing things? Not at all   Feeling down, depressed, or hopeless? Not at all "   PHQ-2 Total Score 0      Health Habits and Functional and Cognitive Screenin/21/2025    12:52 PM   Functional & Cognitive Status   Do you have difficulty preparing food and eating? No   Do you have difficulty bathing yourself, getting dressed or grooming yourself? No   Do you have difficulty using the toilet? No   Do you have difficulty moving around from place to place? No   Do you have trouble with steps or getting out of a bed or a chair? No   Current Diet Well Balanced Diet   Dental Exam Up to date   Eye Exam Up to date   Exercise (times per week) 3 times per week   Current Exercises Include Home Fitness Gym;Yard Work;Walking   Do you need help using the phone?  No   Are you deaf or do you have serious difficulty hearing?  No   Do you need help to go to places out of walking distance? No   Do you need help shopping? No   Do you need help preparing meals?  No   Do you need help with housework?  No   Do you need help with laundry? No   Do you need help taking your medications? No   Do you need help managing money? No   Do you ever drive or ride in a car without wearing a seat belt? No   Have you felt unusual stress, anger or loneliness in the last month? No   Who do you live with? Spouse   If you need help, do you have trouble finding someone available to you? No   Have you been bothered in the last four weeks by sexual problems? No   Do you have difficulty concentrating, remembering or making decisions? No           Visual Acuity:  Vision Screening    Right eye Left eye Both eyes   Without correction      With correction 20/20 20/25 20/15     Age-appropriate Screening Schedule:  Refer to the list below for future screening recommendations based on patient's age, sex and/or medical conditions. Orders for these recommended tests are listed in the plan section. The patient has been provided with a written plan.    Health Maintenance List  Health Maintenance   Topic Date Due    Pneumococcal Vaccine 50+ (1  "of 1 - PCV) Never done    COVID-19 Vaccine (8 - 2024-25 season) 05/17/2025    INFLUENZA VACCINE  07/01/2025    LIPID PANEL  04/15/2026    ANNUAL WELLNESS VISIT  04/21/2026    MAMMOGRAM  06/17/2026    DXA SCAN  06/17/2026    TDAP/TD VACCINES (2 - Td or Tdap) 09/23/2029    COLORECTAL CANCER SCREENING  02/22/2034    HEPATITIS C SCREENING  Completed    ZOSTER VACCINE  Completed                                                                                                                                                CMS Preventative Services Quick Reference  Risk Factors Identified During Encounter  Immunizations Discussed/Encouraged: Prevnar 20 (Pneumococcal 20-valent conjugate)    The above risks/problems have been discussed with the patient.  Pertinent information has been shared with the patient in the After Visit Summary.  An After Visit Summary and PPPS were made available to the patient.    Follow Up:   Next Medicare Wellness visit to be scheduled in 1 year.         Additional E&M Note during same encounter follows:  Patient has additional, significant, and separately identifiable condition(s)/problem(s) that require work above and beyond the Medicare Wellness Visit     Chief Complaint  Welcome To Medicare    Subjective   HPI  Dudley is also being seen today for HTN, iron deficiency anemia and obesity. Denies chest pain or dyspnea. Energy is good. She has been abroad and dietary indulgence. Pantoprazole is continuing to worlk well for her.             Objective   Vital Signs:  /74   Pulse 76   Temp 97.8 °F (36.6 °C)   Resp 16   Ht 160 cm (62.99\")   Wt 94.3 kg (207 lb 12.8 oz)   BMI 36.82 kg/m²   Physical Exam  Vitals reviewed.   Constitutional:       Appearance: Normal appearance. She is well-developed. She is obese.   HENT:      Head: Normocephalic and atraumatic.      Right Ear: Tympanic membrane, ear canal and external ear normal.      Left Ear: Tympanic membrane, ear canal and external ear normal. "      Nose: Nose normal.      Mouth/Throat:      Mouth: Mucous membranes are moist.      Pharynx: Oropharynx is clear. No oropharyngeal exudate or posterior oropharyngeal erythema.   Eyes:      General: No scleral icterus.     Extraocular Movements: Extraocular movements intact.      Conjunctiva/sclera: Conjunctivae normal.      Pupils: Pupils are equal, round, and reactive to light.   Neck:      Thyroid: No thyromegaly.   Cardiovascular:      Rate and Rhythm: Normal rate and regular rhythm.      Heart sounds: Normal heart sounds. No murmur heard.  Pulmonary:      Effort: Pulmonary effort is normal.      Breath sounds: Normal breath sounds.   Abdominal:      General: Bowel sounds are normal. There is no distension.      Palpations: Abdomen is soft.      Tenderness: There is no abdominal tenderness.   Musculoskeletal:      Cervical back: Neck supple.      Comments: Ambulates without assistance; no clubbing, cyanosis or edema.    Lymphadenopathy:      Cervical: No cervical adenopathy.   Skin:     General: Skin is warm and dry.   Neurological:      Mental Status: She is alert.   Psychiatric:         Mood and Affect: Mood normal.         Behavior: Behavior normal.              Assessment and Plan         Medicare annual wellness visit, subsequent    Essential hypertension    Obesity (BMI 30-39.9)  Patient's (Body mass index is 36.82 kg/m².) indicates that they are obese (BMI >30) with health conditions that include hypertension . Weight is unchanged. BMI  is above average; BMI management plan is completed. We discussed portion control and increasing exercise.   Iron deficiency anemia due to chronic blood loss    Need for pneumococcal vaccination    Elevated cholesterol     Diagnoses and all orders for this visit:    1. Medicare annual wellness visit, subsequent (Primary)  Comments:  150 minutes weekly aerobic exercise advised. Prevnar 20 today. QUINCY UTD. Schedule P/P.    2. Essential hypertension  Comments:  Controlled.  Continue current medications and RTO in 6M.    3. Obesity (BMI 30-39.9)  Comments:  Weight loss via dietary changes and 150-300 minutes weekly aerobic exercise advised.  Assessment & Plan:  Patient's (Body mass index is 36.82 kg/m².) indicates that they are obese (BMI >30) with health conditions that include hypertension . Weight is unchanged. BMI  is above average; BMI management plan is completed. We discussed portion control and increasing exercise.       4. Iron deficiency anemia due to chronic blood loss  Comments:  H/H low end of normal with normal ferritin; iron sat 11%. Increase oral iron to bid abd recheck in 3M.  Orders:  -     CBC (No Diff); Future  -     Ferritin; Future  -     Iron Profile; Future    5. Need for pneumococcal vaccination    6. Elevated cholesterol  Comments:  Dietary indulgence recently with travel abroad. Repeat FLP in .  Orders:  -     Lipid Panel With LDL / HDL Ratio; Future               Records reviewed include previous OV with myself as well as labs.     Follow Up   Return in 6 months (on 10/21/2025) for 30--lab also in .  Patient was given instructions and counseling regarding her condition or for health maintenance advice. Please see specific information pulled into the AVS if appropriate.

## 2025-04-03 RX ORDER — PANTOPRAZOLE SODIUM 40 MG/1
40 TABLET, DELAYED RELEASE ORAL
Qty: 180 TABLET | Refills: 3 | Status: SHIPPED | OUTPATIENT
Start: 2025-04-03

## 2025-04-11 NOTE — PATIENT INSTRUCTIONS
Continue pantoprazole 40 mg daily  Long-term use of PPI can be associated with increased risk of osteopenia or osteoporosis, decreased absorption of certain nutrients specifically magnesium, potentially higher incidence of chronic kidney disease, and higher incidence of bacterial colon infections.      Recommendations for GERD:  Follow antireflux precautions.  Recommend avoiding eating within 3 to 4 hours of bedtime.  Avoid foods that can trigger symptoms which may include citrus fruits, spicy foods, tomatoes and red sauces, chocolate, coffee/tea, caffeinated or carbonated beverages, alcohol.     Continue oral iron   Repeat labs were checked today     If loose stools persist, consider adding fiber supplement

## 2025-04-11 NOTE — PROGRESS NOTES
Chief Complaint   Patient presents with    Follow-up     GERD, hiatal hernia, history of Mark's erosions           History of Present Illness  History of Present Illness  The patient presents for annual follow-up.  She has history of GERD, hiatal hernia, and anemia secondary to Mark's erosions.    Last year she had anemia with Hgb less than 8.  EGD showed Mark's erosions as source of anemia.  Colonoscopy was unremarkable.  Since then she has been on oral iron and pantoprazole 40 mg daily.  Hgb 6 months ago was 13.2.  Repeat labs were drawn today and are pending.  She states she is feeling well without fatigue.  She has good coloration.  No overt bleeding.  No reflux, heartburn, or abdominal pain.  She recently traveled to [place] and since returning home she has had slightly loose stools but they are getting closer to normal.    No current health issues. Previous symptoms related to low blood counts due to Mark's erosions. Managed with pantoprazole and oral iron supplements. No pantoprazole refill needed. Lab tests conducted this morning by her primary care physician. No fatigue, normal bowel habits, but recent slightly loose stools after a 2-week trip to [place] and local water consumption. Increased physical activity during the trip, walking 6-7 miles daily.         Result Review :      Progress Notes by Haydee Julien APRN (03/28/2024 09:15)     UPPER GI ENDOSCOPY (02/22/2024 07:49) 5 cm hiatal hernia, plaques in the upper third of the esophagus, and dispersed linear Mark's erosions in the stomach. Small bowel biopsy was unremarkable. Esophagus biopsy revealed minimal acute inflammation and fungal yeast and hyphal elements consistent with Candida esophagitis; negative for Wheat's.   COLONOSCOPY (02/22/2024 07:48) diverticulosis and nonbleeding internal hemorrhoids. 10 year recall.   Tissue Pathology Exam (02/22/2024 09:14)     Medication list reviewed        Review of Systems   Constitutional:  "Negative.    HENT: Negative.     Eyes: Negative.    Respiratory: Negative.     Cardiovascular: Negative.    Gastrointestinal: Negative.    Endocrine: Negative.    Genitourinary: Negative.    Musculoskeletal: Negative.    Skin: Negative.    Allergic/Immunologic: Negative.    Neurological: Negative.    Hematological: Negative.    Psychiatric/Behavioral: Negative.           Vital Signs:   /74   Pulse 72   Temp 98 °F (36.7 °C)   Ht 160 cm (63\")   Wt 95.7 kg (211 lb)   SpO2 98%   BMI 37.38 kg/m²     Body mass index is 37.38 kg/m².     Physical Exam  Constitutional:       Appearance: Normal appearance.   HENT:      Head: Normocephalic and atraumatic.      Nose: Nose normal.   Eyes:      Extraocular Movements: Extraocular movements intact.      Conjunctiva/sclera: Conjunctivae normal.      Pupils: Pupils are equal, round, and reactive to light.   Pulmonary:      Effort: Pulmonary effort is normal.   Musculoskeletal:      Cervical back: Normal range of motion.   Neurological:      General: No focal deficit present.      Mental Status: She is alert and oriented to person, place, and time.   Psychiatric:         Mood and Affect: Mood normal.         Behavior: Behavior normal.         Thought Content: Thought content normal.         Judgment: Judgment normal.             Assessment and Plan    Diagnoses and all orders for this visit:    1. Gastroesophageal reflux disease with esophagitis without hemorrhage (Primary)    2. Hiatal hernia    3. Diverticulosis       Assessment & Plan  1. Anemia:  Anemia in 2024 was secondary to Mark's erosions and has improved.  Repeat labs drawn today and are pending.    - Continue pantoprazole 40 mg daily -discussed potential side effects.  She gets refills from her PCP.  - Continue oral iron supplements.  - Consider repeat endoscopy if anemia persists.    2.  Hiatal hernia, GERD:  See above    3. Loose stools:  New since recent vacation and improving.  Suggest that she add an " fiber supplement.  This could also be helpful for her diverticulosis seen on colonoscopy.      Return in about 1 year (around 4/15/2026).       Patient Instructions   Continue pantoprazole 40 mg daily  Long-term use of PPI can be associated with increased risk of osteopenia or osteoporosis, decreased absorption of certain nutrients specifically magnesium, potentially higher incidence of chronic kidney disease, and higher incidence of bacterial colon infections.      Recommendations for GERD:  Follow antireflux precautions.  Recommend avoiding eating within 3 to 4 hours of bedtime.  Avoid foods that can trigger symptoms which may include citrus fruits, spicy foods, tomatoes and red sauces, chocolate, coffee/tea, caffeinated or carbonated beverages, alcohol.     Continue oral iron   Repeat labs were checked today     If loose stools persist, consider adding fiber supplement              Patient or patient representative verbalized consent for the use of Ambient Listening during the visit with  TANA Majano for chart documentation. 4/15/2025  13:54 EDT            TANA Stewart  [facility] [place] [address]  Office: [phone]

## 2025-04-15 ENCOUNTER — OFFICE VISIT (OUTPATIENT)
Dept: GASTROENTEROLOGY | Facility: CLINIC | Age: 65
End: 2025-04-15
Payer: MEDICARE

## 2025-04-15 VITALS
BODY MASS INDEX: 37.39 KG/M2 | OXYGEN SATURATION: 98 % | TEMPERATURE: 98 F | DIASTOLIC BLOOD PRESSURE: 74 MMHG | HEIGHT: 63 IN | WEIGHT: 211 LBS | HEART RATE: 72 BPM | SYSTOLIC BLOOD PRESSURE: 134 MMHG

## 2025-04-15 DIAGNOSIS — K21.00 GASTROESOPHAGEAL REFLUX DISEASE WITH ESOPHAGITIS WITHOUT HEMORRHAGE: Primary | Chronic | ICD-10-CM

## 2025-04-15 DIAGNOSIS — K44.9 HIATAL HERNIA: Chronic | ICD-10-CM

## 2025-04-15 DIAGNOSIS — K57.90 DIVERTICULOSIS: Chronic | ICD-10-CM

## 2025-04-15 PROCEDURE — 3078F DIAST BP <80 MM HG: CPT | Performed by: NURSE PRACTITIONER

## 2025-04-15 PROCEDURE — 1159F MED LIST DOCD IN RCRD: CPT | Performed by: NURSE PRACTITIONER

## 2025-04-15 PROCEDURE — 99214 OFFICE O/P EST MOD 30 MIN: CPT | Performed by: NURSE PRACTITIONER

## 2025-04-15 PROCEDURE — G2211 COMPLEX E/M VISIT ADD ON: HCPCS | Performed by: NURSE PRACTITIONER

## 2025-04-15 PROCEDURE — 1160F RVW MEDS BY RX/DR IN RCRD: CPT | Performed by: NURSE PRACTITIONER

## 2025-04-15 PROCEDURE — 3075F SYST BP GE 130 - 139MM HG: CPT | Performed by: NURSE PRACTITIONER

## 2025-04-18 PROBLEM — K57.90 DIVERTICULOSIS: Status: RESOLVED | Noted: 2024-02-20 | Resolved: 2025-04-18

## 2025-04-21 ENCOUNTER — OFFICE VISIT (OUTPATIENT)
Dept: INTERNAL MEDICINE | Facility: CLINIC | Age: 65
End: 2025-04-21
Payer: MEDICARE

## 2025-04-21 VITALS
RESPIRATION RATE: 16 BRPM | SYSTOLIC BLOOD PRESSURE: 120 MMHG | DIASTOLIC BLOOD PRESSURE: 74 MMHG | BODY MASS INDEX: 36.82 KG/M2 | HEART RATE: 76 BPM | WEIGHT: 207.8 LBS | HEIGHT: 63 IN | TEMPERATURE: 97.8 F

## 2025-04-21 DIAGNOSIS — E66.9 OBESITY (BMI 30-39.9): Chronic | ICD-10-CM

## 2025-04-21 DIAGNOSIS — D50.0 IRON DEFICIENCY ANEMIA DUE TO CHRONIC BLOOD LOSS: Chronic | ICD-10-CM

## 2025-04-21 DIAGNOSIS — E78.00 ELEVATED CHOLESTEROL: ICD-10-CM

## 2025-04-21 DIAGNOSIS — Z23 NEED FOR PNEUMOCOCCAL VACCINATION: ICD-10-CM

## 2025-04-21 DIAGNOSIS — Z00.00 MEDICARE ANNUAL WELLNESS VISIT, SUBSEQUENT: Primary | ICD-10-CM

## 2025-04-21 DIAGNOSIS — I10 ESSENTIAL HYPERTENSION: Chronic | ICD-10-CM

## 2025-06-04 ENCOUNTER — OFFICE VISIT (OUTPATIENT)
Dept: OBSTETRICS AND GYNECOLOGY | Age: 65
End: 2025-06-04
Payer: MEDICARE

## 2025-06-04 VITALS
SYSTOLIC BLOOD PRESSURE: 112 MMHG | HEIGHT: 63 IN | DIASTOLIC BLOOD PRESSURE: 68 MMHG | BODY MASS INDEX: 36.5 KG/M2 | WEIGHT: 206 LBS

## 2025-06-04 DIAGNOSIS — Z01.419 ENCOUNTER FOR GYNECOLOGICAL EXAMINATION WITHOUT ABNORMAL FINDING: Primary | ICD-10-CM

## 2025-06-04 PROBLEM — D50.0 IRON DEFICIENCY ANEMIA DUE TO CHRONIC BLOOD LOSS: Chronic | Status: RESOLVED | Noted: 2024-02-20 | Resolved: 2025-06-04

## 2025-06-04 NOTE — PROGRESS NOTES
"Subjective   Dudley Estrada is a 65 y.o. female presents to - [facility] today for annual visit , last pap 9/14/20 neg , mammogram 6/17/24 benign @ [facility] , PCP will place the order , dexa 6/17/24 normal , colonoscopy 2/24/24 due 2029 , no problems today . Patient states she is heathy .  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  She and her  have been traveling to a [facility] and they are considering moving back to [place], due to the climate and several friends there.  They also travel to [facility] quite a bit to take care of her 's mother who is 90 years old.  They have been to [place], [place], [place], [place] etc. patient denies any complaints today.  I last saw her in 2020 as a new patient for annual visit.  Reported menopause at age 54. No HRT. She and  are retired. Him from accounting for [employer] and her from [employer]. 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      Gynecologic Exam  The patient's pertinent negatives include no pelvic pain or vaginal discharge. Pertinent negatives include no abdominal pain, chills, dysuria or fever.       The following portions of the patient's history were reviewed and updated as appropriate: allergies, current medications, past family history, past medical history, past social history, past surgical history, and problem list.    Review of Systems   Constitutional:  Negative for chills, fatigue and fever.   Gastrointestinal:  Negative for abdominal distention and abdominal pain.   Genitourinary:  Negative for dysuria, pelvic pain, vaginal bleeding, vaginal discharge and vaginal pain.   All other systems reviewed and are negative.    /68   Ht 160 cm (63\")   Wt 93.4 kg (206 lb)   LMP  (LMP Unknown)   BMI 36.49 kg/m²     Objective   Physical Exam  Vitals and nursing note reviewed.   Constitutional:       Appearance: Normal appearance. She is well-developed and normal " weight.   Neck:      Thyroid: No thyromegaly.   Pulmonary:      Effort: Pulmonary effort is normal.   Chest:   Breasts:     Right: No mass, nipple discharge, skin change or tenderness.      Left: No mass, nipple discharge, skin change or tenderness.   Abdominal:      General: There is no distension.      Palpations: Abdomen is soft.      Tenderness: There is no abdominal tenderness.   Genitourinary:     General: Normal vulva.      Exam position: Lithotomy position.      Labia:         Right: No rash or lesion.         Left: No rash or lesion.       Vagina: Normal. No vaginal discharge or bleeding.      Cervix: No friability or lesion.      Uterus: Not enlarged and not tender.       Adnexa:         Right: No mass or tenderness.          Left: No mass or tenderness.     Musculoskeletal:         General: Normal range of motion.      Cervical back: Normal range of motion.   Skin:     General: Skin is warm and dry.      Findings: No rash.   Neurological:      Mental Status: She is alert and oriented to person, place, and time.   Psychiatric:         Mood and Affect: Mood normal.         Behavior: Behavior normal.           Assessment & Plan   Diagnoses and all orders for this visit:    1. Encounter for gynecological examination without abnormal finding (Primary)        Counseling was given to patient for the following topics: instructions for management, importance of treatment compliance, and self-breast exams .   Return in about 2 years (around 6/4/2027) for Annual physical.

## 2025-06-09 ENCOUNTER — TRANSCRIBE ORDERS (OUTPATIENT)
Dept: ADMINISTRATIVE | Facility: HOSPITAL | Age: 65
End: 2025-06-09
Payer: MEDICARE

## 2025-06-09 DIAGNOSIS — Z12.31 VISIT FOR SCREENING MAMMOGRAM: Primary | ICD-10-CM

## 2025-06-18 ENCOUNTER — TELEPHONE (OUTPATIENT)
Dept: INTERNAL MEDICINE | Facility: CLINIC | Age: 65
End: 2025-06-18
Payer: MEDICARE

## 2025-06-18 NOTE — TELEPHONE ENCOUNTER
Patient called to schedule an appointment with Haydee FAJARDO to have staples removed from her head, she is wanting 6/26/25 or 6/27/25, please advise?  [phone]

## 2025-06-19 ENCOUNTER — HOSPITAL ENCOUNTER (OUTPATIENT)
Dept: MAMMOGRAPHY | Facility: HOSPITAL | Age: 65
Discharge: HOME | End: 2025-06-19
Admitting: NURSE PRACTITIONER
Payer: MEDICARE

## 2025-06-19 DIAGNOSIS — Z12.31 VISIT FOR SCREENING MAMMOGRAM: ICD-10-CM

## 2025-06-19 PROCEDURE — 77063 BREAST TOMOSYNTHESIS BI: CPT

## 2025-06-19 PROCEDURE — 77067 SCR MAMMO BI INCL CAD: CPT

## 2025-06-24 NOTE — PROGRESS NOTES
Subjective   Chief Complaint   Patient presents with    Suture / Staple Removal       History of Present Illness   65 y.o. female presents for follow up for head injury. She tripped and fell after her feet foot tangled  hitting her head on a telephone pole causing abrasion to right knee and injury to the back of her head; she was in [place] at the time. No loss of consciousness. Sustained 2 cm scalp laceration on the right occiput requiring 3 staples. Head CT negative for acute findings.     No knee pain. Abrasion healed.      Patient Active Problem List   Diagnosis    Essential hypertension    Obesity (BMI 30-39.9)    Calculus of gallbladder without cholecystitis without obstruction    Diaphragmatic hernia without obstruction or gangrene    Mark's erosions a/w large hiatal hernia       No Known Allergies    Current Outpatient Medications on File Prior to Visit   Medication Sig Dispense Refill    ferrous sulfate 325 (65 FE) MG EC tablet TAKE 1 TABLET DAILY FOR 2 WEEKS AND INCREASED TO TWICE DAILY IF TOLERATED. 180 tablet 1    losartan (COZAAR) 50 MG tablet TAKE 1 TABLET BY MOUTH EVERY DAY 90 tablet 1    Multiple Vitamins-Minerals (ICAPS AREDS 2 PO) Take 1 tablet by mouth 2 (Two) Times a Day.      Multiple Vitamins-Minerals (MULTI-VITAMIN GUMMIES PO) Take 1 tablet by mouth Daily.      pantoprazole (PROTONIX) 40 MG EC tablet TAKE 1 TABLET BY MOUTH 2 TIMES A DAY BEFORE MEALS. 180 tablet 3     No current facility-administered medications on file prior to visit.       Past Medical History:   Diagnosis Date    Abnormal Pap smear of cervix     years ago     Diverticulosis 2024       Family History   Problem Relation Age of Onset    Hypertension Mother     Diabetes Mother          ; stroke complications    Diverticulitis Mother     Stroke Mother     Heart disease Father     Hearing loss Father          2017    Gallbladder disease Sister     Diverticulitis Sister     Breast cancer Neg Hx      Ovarian cancer Neg Hx     Uterine cancer Neg Hx     Colon cancer Neg Hx     Colon polyps Neg Hx     Crohn's disease Neg Hx     Irritable bowel syndrome Neg Hx     Ulcerative colitis Neg Hx        Social History     Socioeconomic History    Marital status:    Tobacco Use    Smoking status: Never    Smokeless tobacco: Never   Vaping Use    Vaping status: Never Used   Substance and Sexual Activity    Alcohol use: Yes     Alcohol/week: 3.0 standard drinks of alcohol     Types: 1 Glasses of wine, 1 Cans of beer, 1 Shots of liquor per week     Comment: occ    Drug use: Never    Sexual activity: Yes     Partners: Male     Birth control/protection: Post-menopausal     Comment:        Past Surgical History:   Procedure Laterality Date    COLONOSCOPY N/A 12/30/2021    Procedure: COLONOSCOPY;  Surgeon: Bishnu Alvarez MD;  Location: [facility] MAIN OR;  Service: Gastroenterology;  Laterality: N/A;  hemorrhoids, diverticulosis,    COLONOSCOPY N/A 02/22/2024    Procedure: COLONOSCOPY TO CECUM;  Surgeon: Flaco Archer MD;  Location: [facility] ENDOSCOPY;  Service: Gastroenterology;  Laterality: N/A;  PRE- ANEMIA  POST-DIVERTICULOSIS, HEMORRHOIDS    ENDOSCOPY N/A 02/22/2024    Procedure: ESOPHAGOGASTRODUODENOSCOPY WITH COLD BIOPSIES;  Surgeon: Flaco Archer MD;  Location:  [facility] ENDOSCOPY;  Service: Gastroenterology;  Laterality: N/A;  PRE- ANEMIA  POST- HIATAL HERNIA, CAMERAN'S EROSIONS, POSSIBLE CANDIDA    UPPER GASTROINTESTINAL ENDOSCOPY      WISDOM TOOTH EXTRACTION  1987    bottom       The following portions of the patient's history were reviewed and updated as appropriate: problem list, allergies, current medications, past medical history, past social history, and past surgical history.    Review of Systems    Immunization History   Administered Date(s) Administered    COVID-19 (MODERNA) BIVALENT 12+YRS 11/01/2022    COVID-19 (NOVAVAX) 12YRS+ 12/18/2023, 11/17/2024    COVID-19 (PFIZER) Purple Cap  "Monovalent 03/02/2021, 03/23/2021, 10/07/2021    COVID-19 (UNSPECIFIED) 11/17/2024    Covid-19 (Pfizer) Gray Cap Monovalent 05/21/2022    Flu Vaccine Intradermal Quad 18-64YR 10/06/2020    Fluzone  >6mos 10/20/2024    Fluzone Quad >6mos (Multi-dose) 09/23/2021    Influenza Injectable Mdck Pf Quad 11/01/2022, 11/03/2023    Influenza, Unspecified 09/23/2019, 10/20/2024    Pneumococcal Conjugate 20-Valent (PCV20) 04/21/2025    Shingrix 03/02/2020, 06/22/2020    Tdap 09/23/2019    flucelvax quad pfs =>4 YRS 09/23/2019, 10/08/2020       Objective   Vitals:    06/27/25 1129   Temp: 97.8 °F (36.6 °C)   Weight: 95.3 kg (210 lb)   Height: 160 cm (62.99\")     Body mass index is 37.21 kg/m².  Physical Exam  Constitutional:       Appearance: Normal appearance. She is obese.   HENT:      Head: Normocephalic.      Comments: 3 staples removed. Edge well approximated. No bleeding.   Pulmonary:      Effort: Pulmonary effort is normal.   Neurological:      Mental Status: She is alert.   Psychiatric:         Mood and Affect: Mood normal.         Behavior: Behavior normal.         Procedures    Assessment & Plan   Diagnoses and all orders for this visit:    1. Injury of head, subsequent encounter (Primary)    2. Fall, subsequent encounter    3. Laceration of occipital region of scalp, subsequent encounter    She is doing well. No headaches, vision changes, N/V. 3 staples removed. Tolerated well. Triple antibiotic ointment applied to area. No bleeding. Call with questions or concerns.     Recent ER visit in [place] as well as head CT reviewed.     Return for Next scheduled follow up.           "

## 2025-06-27 ENCOUNTER — OFFICE VISIT (OUTPATIENT)
Dept: INTERNAL MEDICINE | Facility: CLINIC | Age: 65
End: 2025-06-27
Payer: MEDICARE

## 2025-06-27 VITALS — TEMPERATURE: 97.8 F | BODY MASS INDEX: 37.21 KG/M2 | HEIGHT: 63 IN | WEIGHT: 210 LBS

## 2025-06-27 DIAGNOSIS — S09.90XD INJURY OF HEAD, SUBSEQUENT ENCOUNTER: Primary | ICD-10-CM
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
## 2025-06-27 DIAGNOSIS — S01.01XD LACERATION OF OCCIPITAL REGION OF SCALP, SUBSEQUENT ENCOUNTER: ICD-10-CM

## 2025-06-27 DIAGNOSIS — Z48.02 VISIT FOR SUTURE REMOVAL: ICD-10-CM

## 2025-07-10 DIAGNOSIS — I10 ESSENTIAL HYPERTENSION: Chronic | ICD-10-CM

## 2025-07-10 RX ORDER — LOSARTAN POTASSIUM 50 MG/1
50 TABLET ORAL DAILY
Qty: 90 TABLET | Refills: 1 | Status: SHIPPED | OUTPATIENT
Start: 2025-07-10

## 2025-07-24 ENCOUNTER — OFFICE VISIT (OUTPATIENT)
Dept: INTERNAL MEDICINE | Facility: CLINIC | Age: 65
End: 2025-07-24
Payer: MEDICARE

## 2025-07-24 VITALS — WEIGHT: 212 LBS | BODY MASS INDEX: 37.56 KG/M2 | HEIGHT: 63 IN | TEMPERATURE: 98 F

## 2025-07-24 DIAGNOSIS — S60.862A INSECT BITE OF LEFT WRIST, INITIAL ENCOUNTER: Primary | ICD-10-CM
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
## 2025-07-24 DIAGNOSIS — D50.0 IRON DEFICIENCY ANEMIA DUE TO CHRONIC BLOOD LOSS: Chronic | ICD-10-CM

## 2025-07-24 DIAGNOSIS — E78.00 ELEVATED CHOLESTEROL: ICD-10-CM

## 2025-07-24 NOTE — PROGRESS NOTES
"Subjective   Chief Complaint   Patient presents with    Insect Bite       History of Present Illness   65 y.o. female presents with cc redness, swelling and heat at left forearm after bug bite 2 days ago. Initially the area was itchy and looked like a \"welt.\" Also has some redness at tip of left nostril after flying insect also flew into her nose. She took Ibuprofen for it yesterday without improvement and realized this morning she probably should have taken and antihistamine.      Patient Active Problem List   Diagnosis    Essential hypertension    Obesity (BMI 30-39.9)    Calculus of gallbladder without cholecystitis without obstruction    Diaphragmatic hernia without obstruction or gangrene    Mark's erosions a/w large hiatal hernia       No Known Allergies    Current Outpatient Medications on File Prior to Visit   Medication Sig Dispense Refill    ferrous sulfate 325 (65 FE) MG EC tablet TAKE 1 TABLET DAILY FOR 2 WEEKS AND INCREASED TO TWICE DAILY IF TOLERATED. 180 tablet 1    losartan (COZAAR) 50 MG tablet TAKE 1 TABLET BY MOUTH EVERY DAY 90 tablet 1    Multiple Vitamins-Minerals (ICAPS AREDS 2 PO) Take 1 tablet by mouth 2 (Two) Times a Day.      Multiple Vitamins-Minerals (MULTI-VITAMIN GUMMIES PO) Take 1 tablet by mouth Daily.      pantoprazole (PROTONIX) 40 MG EC tablet TAKE 1 TABLET BY MOUTH 2 TIMES A DAY BEFORE MEALS. 180 tablet 3     No current facility-administered medications on file prior to visit.       Past Medical History:   Diagnosis Date    Abnormal Pap smear of cervix     years ago     Diverticulosis 2024       Family History   Problem Relation Age of Onset    Hypertension Mother     Diabetes Mother          ; stroke complications    Diverticulitis Mother     Stroke Mother     Heart disease Father     Hearing loss Father          2017    Gallbladder disease Sister     Diverticulitis Sister     Breast cancer Neg Hx     Ovarian cancer Neg Hx     Uterine cancer Neg Hx     " Colon cancer Neg Hx     Colon polyps Neg Hx     Crohn's disease Neg Hx     Irritable bowel syndrome Neg Hx     Ulcerative colitis Neg Hx        Social History     Socioeconomic History    Marital status:    Tobacco Use    Smoking status: Never    Smokeless tobacco: Never   Vaping Use    Vaping status: Never Used   Substance and Sexual Activity    Alcohol use: Yes     Alcohol/week: 3.0 standard drinks of alcohol     Types: 1 Glasses of wine, 1 Cans of beer, 1 Shots of liquor per week     Comment: occ    Drug use: Never    Sexual activity: Yes     Partners: Male     Birth control/protection: Post-menopausal     Comment:        Past Surgical History:   Procedure Laterality Date    COLONOSCOPY N/A 12/30/2021    Procedure: COLONOSCOPY;  Surgeon: Bishnu Alvarez MD;  Location: [facility];  Service: Gastroenterology;  Laterality: N/A;  hemorrhoids, diverticulosis,    COLONOSCOPY N/A 02/22/2024    Procedure: COLONOSCOPY TO CECUM;  Surgeon: Flaco Archer MD;  Location: [facility] ENDOSCOPY;  Service: Gastroenterology;  Laterality: N/A;  PRE- ANEMIA  POST-DIVERTICULOSIS, HEMORRHOIDS    ENDOSCOPY N/A 02/22/2024    Procedure: ESOPHAGOGASTRODUODENOSCOPY WITH COLD BIOPSIES;  Surgeon: Flaco Archer MD;  [facility];  Service: Gastroenterology;  Laterality: N/A;  PRE- ANEMIA  POST- HIATAL HERNIA, CAMERAN'S EROSIONS, POSSIBLE CANDIDA    UPPER GASTROINTESTINAL ENDOSCOPY      WISDOM TOOTH EXTRACTION  1987    bottom       The following portions of the patient's history were reviewed and updated as appropriate: problem list, allergies, current medications, past medical history, and past social history.    Review of Systems    Immunization History   Administered Date(s) Administered    COVID-19 (MODERNA) BIVALENT 12+YRS 11/01/2022    COVID-19 (NOVAVAX) 12YRS+ 12/18/2023, 11/17/2024    COVID-19 (PFIZER) Purple Cap Monovalent 03/02/2021, 03/23/2021, 10/07/2021    COVID-19 (UNSPECIFIED) 11/17/2024  "   Covid-19 (Pfizer) Gray Cap Monovalent 05/21/2022    Flu Vaccine Intradermal Quad 18-64YR 10/06/2020    Fluzone  >6mos 10/20/2024    Fluzone Quad >6mos (Multi-dose) 09/23/2021    Influenza Injectable Mdck Pf Quad 11/01/2022, 11/03/2023    Influenza, Unspecified 09/23/2019, 10/20/2024    Pneumococcal Conjugate 20-Valent (PCV20) 04/21/2025    Shingrix 03/02/2020, 06/22/2020    Tdap 09/23/2019    flucelvax quad pfs =>4 YRS 09/23/2019, 10/08/2020       Objective   Vitals:    07/24/25 1120   Temp: 98 °F (36.7 °C)   Weight: 96.2 kg (212 lb)   Height: 160 cm (62.99\")     Body mass index is 37.56 kg/m².  Physical Exam  Constitutional:       Appearance: Normal appearance. She is obese.   HENT:      Head: Normocephalic and atraumatic.      Nose:      Comments: Slight redness noted at tip of left nostril. No swelling.   Skin:     Comments: Bite at left ulnar forearm with mild swelling. Area is pink an oblong 2' width and 3\" length. Warmth is same as right wrist.    Neurological:      Mental Status: She is alert.         Procedures    Assessment & Plan   Diagnoses and all orders for this visit:    1. Insect bite of left wrist, initial encounter (Primary)  Comments:  Advised to start Zyrtec 10 mg bid. Hand written Rx for Keflex 500 mg tid and Medrol dose pack if no improvement or worsens after 3 doses Zyrtec. She will message me regarding progress.     2. Iron deficiency anemia due to chronic blood loss  Comments:  Improving continue oral iron supplement and repeat CBC, ferritin, iron panel in 3M.  Orders:  -     CBC & Differential; Future  -     Ferritin; Future  -     Iron Profile w/o Ferritin; Future    3. Elevated cholesterol  Comments:  Cholesterol worsening with ASCVD risk 6%. No statin at this time but she will make dietary changes and exercise. Declines nutritionist. Repeat FLP in 3M.  Orders:  -     Lipid Panel With LDL / HDL Ratio; Future    Records reviewed include previous OV with myself as well as labs.     Return " in about 3 months (around 10/24/2025) for Lab B4 FUP.

## (undated) DEVICE — CANN NASL CO2 TRULINK W/O2 A/

## (undated) DEVICE — GOWN ,SIRUS,NONREINFORCED 3XL: Brand: MEDLINE

## (undated) DEVICE — FRCP BX RADJAW4 NDL 2.8 240CM LG OG BX40

## (undated) DEVICE — LN SMPL CO2 SHTRM SD STREAM W/M LUER

## (undated) DEVICE — KT ORCA ORCAPOD DISP STRL

## (undated) DEVICE — Device

## (undated) DEVICE — SENSR O2 OXIMAX FNGR A/ 18IN NONSTR

## (undated) DEVICE — TUBING, SUCTION, 1/4" X 10', STRAIGHT: Brand: MEDLINE

## (undated) DEVICE — GOWN ISOL W/THUMB UNIV BLU BX/15

## (undated) DEVICE — CANN O2 ETCO2 FITS ALL CONN CO2 SMPL A/ 7IN DISP LF

## (undated) DEVICE — FLEX ADVANTAGE 1500CC: Brand: FLEX ADVANTAGE

## (undated) DEVICE — BLCK/BITE BLOX W/DENTL/RIM W/STRAP 54F

## (undated) DEVICE — ADAPT CLN BIOGUARD AIR/H2O DISP